# Patient Record
Sex: MALE | Race: WHITE | NOT HISPANIC OR LATINO
[De-identification: names, ages, dates, MRNs, and addresses within clinical notes are randomized per-mention and may not be internally consistent; named-entity substitution may affect disease eponyms.]

---

## 2017-03-27 DIAGNOSIS — Z00.00 ENCOUNTER FOR GENERAL ADULT MEDICAL EXAMINATION W/OUT ABNORMAL FINDINGS: ICD-10-CM

## 2017-04-12 ENCOUNTER — RESULT CHARGE (OUTPATIENT)
Age: 57
End: 2017-04-12

## 2017-04-12 ENCOUNTER — MED ADMIN CHARGE (OUTPATIENT)
Age: 57
End: 2017-04-12

## 2017-04-12 ENCOUNTER — APPOINTMENT (OUTPATIENT)
Dept: INTERNAL MEDICINE | Facility: CLINIC | Age: 57
End: 2017-04-12

## 2017-04-12 VITALS
SYSTOLIC BLOOD PRESSURE: 132 MMHG | DIASTOLIC BLOOD PRESSURE: 80 MMHG | TEMPERATURE: 98.4 F | HEART RATE: 82 BPM | BODY MASS INDEX: 42.66 KG/M2 | WEIGHT: 315 LBS | OXYGEN SATURATION: 95 % | HEIGHT: 72 IN | RESPIRATION RATE: 20 BRPM

## 2017-04-12 DIAGNOSIS — T59.94XA: ICD-10-CM

## 2017-04-12 DIAGNOSIS — Z92.89 PERSONAL HISTORY OF OTHER MEDICAL TREATMENT: ICD-10-CM

## 2017-04-12 DIAGNOSIS — N52.9 MALE ERECTILE DYSFUNCTION, UNSPECIFIED: ICD-10-CM

## 2017-04-12 DIAGNOSIS — R31.29 OTHER MICROSCOPIC HEMATURIA: ICD-10-CM

## 2017-04-12 DIAGNOSIS — F32.9 MAJOR DEPRESSIVE DISORDER, SINGLE EPISODE, UNSPECIFIED: ICD-10-CM

## 2017-04-12 DIAGNOSIS — S89.91XA UNSPECIFIED INJURY OF RIGHT LOWER LEG, INITIAL ENCOUNTER: ICD-10-CM

## 2017-04-12 DIAGNOSIS — Z84.89 FAMILY HISTORY OF OTHER SPECIFIED CONDITIONS: ICD-10-CM

## 2017-04-12 DIAGNOSIS — S42.409A UNSPECIFIED FRACTURE OF LOWER END OF UNSPECIFIED HUMERUS, INITIAL ENCOUNTER FOR CLOSED FRACTURE: ICD-10-CM

## 2017-04-12 DIAGNOSIS — S32.2XXA FRACTURE OF COCCYX, INITIAL ENCOUNTER FOR CLOSED FRACTURE: ICD-10-CM

## 2017-04-12 DIAGNOSIS — J86.9 PYOTHORAX W/OUT FISTULA: ICD-10-CM

## 2017-04-12 DIAGNOSIS — Z86.39 PERSONAL HISTORY OF OTHER ENDOCRINE, NUTRITIONAL AND METABOLIC DISEASE: ICD-10-CM

## 2017-04-12 DIAGNOSIS — Z63.4 DISAPPEARANCE AND DEATH OF FAMILY MEMBER: ICD-10-CM

## 2017-04-12 DIAGNOSIS — Z87.01 PERSONAL HISTORY OF PNEUMONIA (RECURRENT): ICD-10-CM

## 2017-04-12 LAB — GLUCOSE BLDC GLUCOMTR-MCNC: 193

## 2017-04-12 RX ORDER — MULTIVITAMIN
TABLET ORAL
Refills: 0 | Status: ACTIVE | COMMUNITY

## 2017-04-12 RX ORDER — FELODIPINE 10 MG
10 TABLET, EXTENDED RELEASE 24 HR ORAL
Refills: 0 | Status: COMPLETED | COMMUNITY
End: 2017-04-12

## 2017-04-12 RX ORDER — DICLOFENAC SODIUM 10 MG/G
1 GEL TOPICAL
Refills: 0 | Status: COMPLETED | COMMUNITY
End: 2017-04-12

## 2017-04-12 RX ORDER — TRIAMCINOLONE ACETONIDE 40 MG/ML
40 INJECTION, SUSPENSION INTRA-ARTICULAR; INTRAMUSCULAR
Qty: 1 | Refills: 0 | Status: COMPLETED | OUTPATIENT
Start: 2017-04-12

## 2017-04-12 RX ORDER — BUDESONIDE 0.5 MG/2ML
0.5 SUSPENSION RESPIRATORY (INHALATION)
Refills: 0 | Status: COMPLETED | COMMUNITY
End: 2017-04-12

## 2017-04-12 RX ORDER — UBIDECARENONE/VIT E ACET 100MG-5
50 MCG CAPSULE ORAL
Refills: 0 | Status: ACTIVE | COMMUNITY

## 2017-04-12 RX ORDER — CANAGLIFLOZIN 100 MG/1
100 TABLET, FILM COATED ORAL
Refills: 0 | Status: COMPLETED | COMMUNITY
End: 2017-04-12

## 2017-04-12 RX ORDER — OLMESARTAN MEDOXOMIL-HYDROCHLOROTHIAZIDE 12.5; 4 MG/1; MG/1
40-12.5 TABLET, FILM COATED ORAL
Refills: 0 | Status: COMPLETED | COMMUNITY
End: 2017-04-12

## 2017-04-12 RX ORDER — TADALAFIL 5 MG/1
5 TABLET, FILM COATED ORAL
Refills: 0 | Status: COMPLETED | COMMUNITY
End: 2017-04-12

## 2017-04-12 RX ORDER — HYDROCHLOROTHIAZIDE 12.5 MG/1
12.5 CAPSULE ORAL
Refills: 0 | Status: COMPLETED | COMMUNITY
End: 2017-04-12

## 2017-04-12 RX ADMIN — TRIAMCINOLONE ACETONIDE 1.5 MG/ML: 40 INJECTION, SUSPENSION INTRA-ARTICULAR; INTRAMUSCULAR at 00:00

## 2017-04-12 SDOH — SOCIAL STABILITY - SOCIAL INSECURITY: DISSAPEARANCE AND DEATH OF FAMILY MEMBER: Z63.4

## 2017-05-11 ENCOUNTER — RX RENEWAL (OUTPATIENT)
Age: 57
End: 2017-05-11

## 2017-06-12 ENCOUNTER — MEDICATION RENEWAL (OUTPATIENT)
Age: 57
End: 2017-06-12

## 2017-07-11 ENCOUNTER — MEDICATION RENEWAL (OUTPATIENT)
Age: 57
End: 2017-07-11

## 2017-07-26 ENCOUNTER — APPOINTMENT (OUTPATIENT)
Dept: INTERNAL MEDICINE | Facility: CLINIC | Age: 57
End: 2017-07-26

## 2017-07-26 VITALS
OXYGEN SATURATION: 96 % | RESPIRATION RATE: 20 BRPM | BODY MASS INDEX: 42.66 KG/M2 | SYSTOLIC BLOOD PRESSURE: 140 MMHG | HEART RATE: 98 BPM | DIASTOLIC BLOOD PRESSURE: 80 MMHG | TEMPERATURE: 97.8 F | WEIGHT: 315 LBS | HEIGHT: 72 IN

## 2017-07-26 RX ORDER — ENALAPRIL MALEATE 10 MG/1
10 TABLET ORAL
Refills: 0 | Status: COMPLETED | COMMUNITY
End: 2017-07-26

## 2017-08-11 ENCOUNTER — MEDICATION RENEWAL (OUTPATIENT)
Age: 57
End: 2017-08-11

## 2017-08-18 ENCOUNTER — APPOINTMENT (OUTPATIENT)
Dept: INTERNAL MEDICINE | Facility: CLINIC | Age: 57
End: 2017-08-18

## 2017-08-30 LAB — GLUCOSE BLDC GLUCOMTR-MCNC: 152

## 2017-09-05 ENCOUNTER — RX RENEWAL (OUTPATIENT)
Age: 57
End: 2017-09-05

## 2017-09-13 ENCOUNTER — MEDICATION RENEWAL (OUTPATIENT)
Age: 57
End: 2017-09-13

## 2017-10-09 ENCOUNTER — MEDICATION RENEWAL (OUTPATIENT)
Age: 57
End: 2017-10-09

## 2017-10-13 ENCOUNTER — RX RENEWAL (OUTPATIENT)
Age: 57
End: 2017-10-13

## 2017-11-08 PROBLEM — F41.9 ANXIETY: Status: ACTIVE | Noted: 2017-04-12

## 2017-11-08 RX ORDER — ASPIRIN 81 MG
81 TABLET, DELAYED RELEASE (ENTERIC COATED) ORAL
Refills: 0 | Status: COMPLETED | COMMUNITY
End: 2017-11-08

## 2017-11-09 ENCOUNTER — APPOINTMENT (OUTPATIENT)
Dept: INTERNAL MEDICINE | Facility: CLINIC | Age: 57
End: 2017-11-09
Payer: OTHER MISCELLANEOUS

## 2017-11-09 VITALS
HEART RATE: 77 BPM | HEIGHT: 72 IN | SYSTOLIC BLOOD PRESSURE: 126 MMHG | BODY MASS INDEX: 42.66 KG/M2 | TEMPERATURE: 97.2 F | RESPIRATION RATE: 16 BRPM | DIASTOLIC BLOOD PRESSURE: 80 MMHG | OXYGEN SATURATION: 98 % | WEIGHT: 315 LBS

## 2017-11-09 DIAGNOSIS — F41.9 ANXIETY DISORDER, UNSPECIFIED: ICD-10-CM

## 2017-11-09 PROCEDURE — 94060 EVALUATION OF WHEEZING: CPT

## 2017-11-09 PROCEDURE — 99214 OFFICE O/P EST MOD 30 MIN: CPT | Mod: 25

## 2017-11-14 ENCOUNTER — RX RENEWAL (OUTPATIENT)
Age: 57
End: 2017-11-14

## 2017-12-13 ENCOUNTER — RX RENEWAL (OUTPATIENT)
Age: 57
End: 2017-12-13

## 2018-01-12 ENCOUNTER — MEDICATION RENEWAL (OUTPATIENT)
Age: 58
End: 2018-01-12

## 2018-01-23 ENCOUNTER — RESULT CHARGE (OUTPATIENT)
Age: 58
End: 2018-01-23

## 2018-01-23 ENCOUNTER — APPOINTMENT (OUTPATIENT)
Dept: INTERNAL MEDICINE | Facility: CLINIC | Age: 58
End: 2018-01-23
Payer: MEDICARE

## 2018-01-23 VITALS
OXYGEN SATURATION: 96 % | HEART RATE: 81 BPM | BODY MASS INDEX: 42.66 KG/M2 | TEMPERATURE: 98.3 F | SYSTOLIC BLOOD PRESSURE: 132 MMHG | RESPIRATION RATE: 16 BRPM | DIASTOLIC BLOOD PRESSURE: 82 MMHG | HEIGHT: 72 IN | WEIGHT: 315 LBS

## 2018-01-23 DIAGNOSIS — K63.5 POLYP OF COLON: ICD-10-CM

## 2018-01-23 LAB — GLUCOSE BLDC GLUCOMTR-MCNC: 130

## 2018-01-23 PROCEDURE — 82962 GLUCOSE BLOOD TEST: CPT

## 2018-01-23 PROCEDURE — 99214 OFFICE O/P EST MOD 30 MIN: CPT | Mod: 25

## 2018-01-23 PROCEDURE — G0008: CPT

## 2018-01-23 PROCEDURE — 94060 EVALUATION OF WHEEZING: CPT

## 2018-01-23 PROCEDURE — 90686 IIV4 VACC NO PRSV 0.5 ML IM: CPT | Mod: GA

## 2018-01-23 RX ORDER — SILDENAFIL 100 MG/1
100 TABLET, FILM COATED ORAL
Qty: 30 | Refills: 3 | Status: ACTIVE | COMMUNITY
Start: 1900-01-01 | End: 1900-01-01

## 2018-01-29 ENCOUNTER — RX RENEWAL (OUTPATIENT)
Age: 58
End: 2018-01-29

## 2018-02-14 ENCOUNTER — MEDICATION RENEWAL (OUTPATIENT)
Age: 58
End: 2018-02-14

## 2018-02-21 LAB
CHOLEST SERPL-MCNC: 194
CHOLEST/HDLC SERPL: 2.7
GLUCOSE SERPL-MCNC: 167
HBA1C MFR BLD HPLC: 7.2
HDLC SERPL-MCNC: 73
LDLC SERPL CALC-MCNC: 107
PSA SERPL-MCNC: 6.3

## 2018-03-14 ENCOUNTER — RX RENEWAL (OUTPATIENT)
Age: 58
End: 2018-03-14

## 2018-03-20 ENCOUNTER — RX RENEWAL (OUTPATIENT)
Age: 58
End: 2018-03-20

## 2018-04-16 ENCOUNTER — RX RENEWAL (OUTPATIENT)
Age: 58
End: 2018-04-16

## 2018-04-30 ENCOUNTER — RX RENEWAL (OUTPATIENT)
Age: 58
End: 2018-04-30

## 2018-05-09 ENCOUNTER — APPOINTMENT (OUTPATIENT)
Dept: INTERNAL MEDICINE | Facility: CLINIC | Age: 58
End: 2018-05-09
Payer: MEDICARE

## 2018-05-09 ENCOUNTER — MED ADMIN CHARGE (OUTPATIENT)
Age: 58
End: 2018-05-09

## 2018-05-09 ENCOUNTER — NON-APPOINTMENT (OUTPATIENT)
Age: 58
End: 2018-05-09

## 2018-05-09 VITALS
TEMPERATURE: 98 F | SYSTOLIC BLOOD PRESSURE: 130 MMHG | HEIGHT: 72 IN | BODY MASS INDEX: 42.66 KG/M2 | DIASTOLIC BLOOD PRESSURE: 80 MMHG | HEART RATE: 80 BPM | OXYGEN SATURATION: 96 % | RESPIRATION RATE: 16 BRPM | WEIGHT: 315 LBS

## 2018-05-09 DIAGNOSIS — Z57.9 SEVERE PERSISTENT ASTHMA WITH (ACUTE) EXACERBATION: ICD-10-CM

## 2018-05-09 DIAGNOSIS — J45.51 SEVERE PERSISTENT ASTHMA WITH (ACUTE) EXACERBATION: ICD-10-CM

## 2018-05-09 DIAGNOSIS — R97.20 ELEVATED PROSTATE, SPECIFIC ANTIGEN [PSA]: ICD-10-CM

## 2018-05-09 PROCEDURE — 99214 OFFICE O/P EST MOD 30 MIN: CPT | Mod: 25

## 2018-05-09 PROCEDURE — 96372 THER/PROPH/DIAG INJ SC/IM: CPT | Mod: 59

## 2018-05-09 PROCEDURE — 94060 EVALUATION OF WHEEZING: CPT

## 2018-05-09 RX ORDER — TRIAMCINOLONE ACETONIDE 40 MG/ML
40 SUSPENSION INTRA-ARTERIAL; INTRAMUSCULAR
Qty: 1 | Refills: 0 | Status: COMPLETED | OUTPATIENT
Start: 2018-05-09

## 2018-05-09 RX ORDER — CEFUROXIME AXETIL 500 MG/1
500 TABLET ORAL
Qty: 20 | Refills: 0 | Status: COMPLETED | COMMUNITY
Start: 2018-01-23 | End: 2018-05-09

## 2018-05-09 RX ORDER — PREDNISONE 20 MG/1
20 TABLET ORAL TWICE DAILY
Qty: 12 | Refills: 0 | Status: COMPLETED | COMMUNITY
Start: 2018-01-23 | End: 2018-05-09

## 2018-05-09 RX ADMIN — TRIAMCINOLONE ACETONIDE 1.5 MG/ML: 40 INJECTION, SUSPENSION INTRA-ARTICULAR; INTRAMUSCULAR at 00:00

## 2018-05-09 SDOH — HEALTH STABILITY - PHYSICAL HEALTH: OCCUPATIONAL EXPOSURE TO UNSPECIFIED RISK FACTOR: Z57.9

## 2018-05-16 ENCOUNTER — RX RENEWAL (OUTPATIENT)
Age: 58
End: 2018-05-16

## 2018-06-18 ENCOUNTER — MEDICATION RENEWAL (OUTPATIENT)
Age: 58
End: 2018-06-18

## 2018-06-18 RX ORDER — METFORMIN HYDROCHLORIDE 1000 MG/1
1000 TABLET, COATED ORAL TWICE DAILY
Qty: 180 | Refills: 1 | Status: ACTIVE | COMMUNITY
Start: 1900-01-01 | End: 1900-01-01

## 2018-07-02 ENCOUNTER — RX RENEWAL (OUTPATIENT)
Age: 58
End: 2018-07-02

## 2018-07-02 RX ORDER — LEVOFLOXACIN 500 MG/1
500 TABLET, FILM COATED ORAL DAILY
Qty: 10 | Refills: 0 | Status: COMPLETED | COMMUNITY
Start: 2018-05-09 | End: 2018-07-02

## 2018-07-02 RX ORDER — PREDNISONE 20 MG/1
20 TABLET ORAL DAILY
Qty: 18 | Refills: 1 | Status: COMPLETED | COMMUNITY
Start: 2018-05-09 | End: 2018-07-02

## 2018-07-03 ENCOUNTER — RX RENEWAL (OUTPATIENT)
Age: 58
End: 2018-07-03

## 2018-07-10 ENCOUNTER — RX RENEWAL (OUTPATIENT)
Age: 58
End: 2018-07-10

## 2018-07-17 ENCOUNTER — RX RENEWAL (OUTPATIENT)
Age: 58
End: 2018-07-17

## 2018-07-23 ENCOUNTER — MEDICATION RENEWAL (OUTPATIENT)
Age: 58
End: 2018-07-23

## 2018-08-20 ENCOUNTER — MEDICATION RENEWAL (OUTPATIENT)
Age: 58
End: 2018-08-20

## 2018-08-22 ENCOUNTER — MEDICATION RENEWAL (OUTPATIENT)
Age: 58
End: 2018-08-22

## 2018-09-17 ENCOUNTER — RESULT CHARGE (OUTPATIENT)
Age: 58
End: 2018-09-17

## 2018-09-18 ENCOUNTER — MED ADMIN CHARGE (OUTPATIENT)
Age: 58
End: 2018-09-18

## 2018-09-18 ENCOUNTER — NON-APPOINTMENT (OUTPATIENT)
Age: 58
End: 2018-09-18

## 2018-09-18 ENCOUNTER — APPOINTMENT (OUTPATIENT)
Dept: INTERNAL MEDICINE | Facility: CLINIC | Age: 58
End: 2018-09-18
Payer: MEDICARE

## 2018-09-18 VITALS
OXYGEN SATURATION: 96 % | SYSTOLIC BLOOD PRESSURE: 148 MMHG | DIASTOLIC BLOOD PRESSURE: 80 MMHG | HEART RATE: 74 BPM | RESPIRATION RATE: 20 BRPM | HEIGHT: 72 IN | WEIGHT: 315 LBS | BODY MASS INDEX: 42.66 KG/M2 | TEMPERATURE: 97.9 F

## 2018-09-18 DIAGNOSIS — M54.5 LOW BACK PAIN: ICD-10-CM

## 2018-09-18 DIAGNOSIS — J45.909 UNSPECIFIED ASTHMA, UNCOMPLICATED: ICD-10-CM

## 2018-09-18 DIAGNOSIS — M25.50 PAIN IN UNSPECIFIED JOINT: ICD-10-CM

## 2018-09-18 DIAGNOSIS — C61 MALIGNANT NEOPLASM OF PROSTATE: ICD-10-CM

## 2018-09-18 DIAGNOSIS — Z23 ENCOUNTER FOR IMMUNIZATION: ICD-10-CM

## 2018-09-18 DIAGNOSIS — G89.29 LOW BACK PAIN: ICD-10-CM

## 2018-09-18 DIAGNOSIS — N40.1 BENIGN PROSTATIC HYPERPLASIA WITH LOWER URINARY TRACT SYMPMS: ICD-10-CM

## 2018-09-18 DIAGNOSIS — N13.8 BENIGN PROSTATIC HYPERPLASIA WITH LOWER URINARY TRACT SYMPMS: ICD-10-CM

## 2018-09-18 LAB
GLUCOSE BLDC GLUCOMTR-MCNC: 216
HBA1C MFR BLD HPLC: 8.5

## 2018-09-18 PROCEDURE — 94060 EVALUATION OF WHEEZING: CPT

## 2018-09-18 PROCEDURE — 90686 IIV4 VACC NO PRSV 0.5 ML IM: CPT

## 2018-09-18 PROCEDURE — 82962 GLUCOSE BLOOD TEST: CPT

## 2018-09-18 PROCEDURE — 93000 ELECTROCARDIOGRAM COMPLETE: CPT

## 2018-09-18 PROCEDURE — 99214 OFFICE O/P EST MOD 30 MIN: CPT | Mod: 25

## 2018-09-18 PROCEDURE — G0008: CPT

## 2018-09-18 RX ORDER — GLIMEPIRIDE 2 MG/1
2 TABLET ORAL
Qty: 90 | Refills: 1 | Status: ACTIVE | COMMUNITY
Start: 2017-09-05 | End: 1900-01-01

## 2018-09-18 RX ORDER — OXYCODONE AND ACETAMINOPHEN 10; 325 MG/1; MG/1
10-325 TABLET ORAL
Qty: 1 | Refills: 0 | Status: COMPLETED | COMMUNITY
Start: 2018-04-16

## 2018-09-18 RX ORDER — CIPROFLOXACIN HYDROCHLORIDE 500 MG/1
500 TABLET, FILM COATED ORAL
Qty: 2 | Refills: 0 | Status: COMPLETED | COMMUNITY
Start: 2018-04-16

## 2018-09-18 RX ORDER — DIAZEPAM 10 MG/1
10 TABLET ORAL
Qty: 1 | Refills: 0 | Status: COMPLETED | COMMUNITY
Start: 2018-04-16

## 2018-09-18 RX ORDER — ANHYDROUS CITRIC ACID, SODIUM BICARBONATE, AND ASPIRIN 1000; 1985; 500 MG/1; MG/1; MG/1
325 GRANULE, EFFERVESCENT ORAL
Qty: 100 | Refills: 0 | Status: ACTIVE | COMMUNITY

## 2018-09-18 NOTE — PLAN
[FreeTextEntry1] : Strict low fat/chol/Na ADA 1500 moe diet.\par Maintain proper hydration.\par He refuses ER now.\par Referred to cardiology now, Dr Cruz. Office called and appoint scheduled.\par He will call EMS for recurrent anginal sxs and understands the importance.\par ASA 325mg EC QD starting today.\par Continue current medications.\par He must lose weight and is aware of the importance.\par Endocrine consult ASAP in [place].\par Urology and Oncology visit as soon as he returns to [place].\par Opthal and dental visits to continue yearly.\par He will call with any decomp.\par F/U 4 mos or sooner PRn.\par

## 2018-09-18 NOTE — COUNSELING
[Weight management counseling provided] : Weight management [Healthy eating counseling provided] : healthy eating [Activity counseling provided] : activity [Low Fat Diet] : Low fat diet [Low Salt Diet] : Low salt diet [Good understanding] : Patient has a good understanding of lifestyle changes and the steps needed to achieve self management goals [de-identified] : N

## 2018-09-18 NOTE — REVIEW OF SYSTEMS
[Fever] : no fever [Chills] : no chills [Feeling Poorly] : not feeling poorly [Feeling Tired] : feeling tired [Recent Weight Gain (___ Lbs)] : no recent weight gain [Recent Weight Loss (___ Lbs)] : no recent weight loss [Eyesight Problems] : no eyesight problems [Discharge From Eyes] : no purulent discharge from the eyes [Nosebleeds] : no nosebleeds [Nasal Discharge] : no nasal discharge [Sore Throat] : no sore throat [Hoarseness] : no hoarseness [Chest Pain] : no chest pain [Palpitations] : no palpitations [Leg Claudication] : no intermittent leg claudication [Lower Ext Edema] : lower extremity edema [Cough] : cough [Orthopnea] : orthopnea [Wheezing] : wheezing [SOB on Exertion] : shortness of breath during exertion [PND] : no PND [Abdominal Pain] : no abdominal pain [Constipation] : no constipation [Heartburn] : no heartburn [Melena] : no melena [Dysuria] : no dysuria [Incontinence] : no incontinence [Hesitancy] : no urinary hesitancy [Nocturia] : nocturia [Arthralgias] : arthralgias [Joint Swelling] : no joint swelling [Joint Stiffness] : joint stiffness [Sleep Disturbances] : no sleep disturbances [Anxiety] : anxiety [Depression] : no depression [Muscle Weakness] : no muscle weakness [Easy Bleeding] : no tendency for easy bleeding [Easy Bruising] : no tendency for easy bruising [Swollen Glands] : no swollen glands [Negative] : Heme/Lymph

## 2018-09-18 NOTE — PHYSICAL EXAM
[General Appearance - Alert] : alert [General Appearance - In No Acute Distress] : in no acute distress [General Appearance - Well Developed] : well developed [Sclera] : the sclera and conjunctiva were normal [PERRL With Normal Accommodation] : pupils were equal in size, round, and reactive to light [Strabismus] : no strabismus was seen [Outer Ear] : the ears and nose were normal in appearance [Hearing Threshold Finger Rub Not Snyder] : hearing was normal [Examination Of The Oral Cavity] : the lips and gums were normal [Neck Appearance] : the appearance of the neck was normal [Neck Cervical Mass (___cm)] : no neck mass was observed [Jugular Venous Distention Increased] : there was no jugular-venous distention [Thyroid Diffuse Enlargement] : the thyroid was not enlarged [Respiration, Rhythm And Depth] : normal respiratory rhythm and effort [Exaggerated Use Of Accessory Muscles For Inspiration] : no accessory muscle use [Heart Rate And Rhythm] : heart rate was normal and rhythm regular [Heart Sounds] : normal S1 and S2 [Heart Sounds Gallop] : no gallops [Systolic grade ___/6] : A grade [unfilled]/6 systolic murmur was heard. [Arterial Pulses Carotid] : carotid pulses were normal with no bruits [Veins - Varicosity Changes] : there were no varicosital changes [Pitting Edema] : pitting edema present [___ +] : bilateral [unfilled]+ pitting edema to the ankles [Abdomen Soft] : soft [Abdomen Tenderness] : non-tender [Cervical Lymph Nodes Enlarged Anterior Bilaterally] : anterior cervical [Supraclavicular Lymph Nodes Enlarged Bilaterally] : supraclavicular [No CVA Tenderness] : no ~M costovertebral angle tenderness [FreeTextEntry1] : tenderness with palp of the L/S paraspinal muscultature bilat [Nail Clubbing] : no clubbing  or cyanosis of the fingernails [Motor Tone] : muscle strength and tone were normal [Limping On The Right] : limping on the right [Skin Color & Pigmentation] : normal skin color and pigmentation [Skin Turgor] : normal skin turgor [] : no rash [No Focal Deficits] : no focal deficits [Oriented To Time, Place, And Person] : oriented to person, place, and time [Impaired Insight] : insight and judgment were intact [Affect] : the affect was normal [Mood] : the mood was normal

## 2018-09-18 NOTE — HISTORY OF PRESENT ILLNESS
[FreeTextEntry1] : Chest pain, prostate cancer, HTN, DM, asthma and obesity. [de-identified] : The patient has been diagnosed with prostate cancer by Urology in [place] but was told he was too high of a surgical risk for prostatectomy. He has been evaluated by urologic oncology and scheduled for "chemo" as well as possible radiation. He states he has been compliant with strict diet but unable to maintain weight loss. Exercise regimen has been started but he has been experiencing chest pain at times.\par \par Chest pain is episodic, "every few weeks," qualified as sharp to dull pressure and lasts less than 30 min. Typically at rest or durnig stress but not with exercise. This is not associated with palpitation, nausea, GERD, diaphoresis or dyspnea. He notes stable HUANG with chronic "wheezy cough." There is no abd pain, melena or BRBPR. Last stress test was in [place] and over 1 year ago.\par \par He states he is compliant with diabetic diet and medications as listed. "I'm doing good with my medicine now." He does not check his BP or BS at home but denies sxs of hypotension, hypoglycemia, polyuria, polydipsia or polyphagia. He has been compliant with CPAP at .

## 2018-09-18 NOTE — HEALTH RISK ASSESSMENT
[No falls in past year] : Patient reported no falls in the past year [0] : 2) Feeling down, depressed, or hopeless: Not at all (0) [EYM1Pygnr] : 0

## 2018-09-24 ENCOUNTER — MEDICATION RENEWAL (OUTPATIENT)
Age: 58
End: 2018-09-24

## 2018-09-28 ENCOUNTER — MEDICATION RENEWAL (OUTPATIENT)
Age: 58
End: 2018-09-28

## 2018-10-23 ENCOUNTER — RX RENEWAL (OUTPATIENT)
Age: 58
End: 2018-10-23

## 2018-10-29 ENCOUNTER — MEDICATION RENEWAL (OUTPATIENT)
Age: 58
End: 2018-10-29

## 2018-11-19 ENCOUNTER — RX RENEWAL (OUTPATIENT)
Age: 58
End: 2018-11-19

## 2018-11-28 ENCOUNTER — MEDICATION RENEWAL (OUTPATIENT)
Age: 58
End: 2018-11-28

## 2018-12-21 ENCOUNTER — MEDICATION RENEWAL (OUTPATIENT)
Age: 58
End: 2018-12-21

## 2019-01-10 ENCOUNTER — APPOINTMENT (OUTPATIENT)
Dept: INTERNAL MEDICINE | Facility: CLINIC | Age: 59
End: 2019-01-10

## 2019-05-06 ENCOUNTER — RX RENEWAL (OUTPATIENT)
Age: 59
End: 2019-05-06

## 2019-11-12 ENCOUNTER — NON-APPOINTMENT (OUTPATIENT)
Age: 59
End: 2019-11-12

## 2019-11-12 ENCOUNTER — APPOINTMENT (OUTPATIENT)
Dept: INTERNAL MEDICINE | Facility: CLINIC | Age: 59
End: 2019-11-12
Payer: OTHER MISCELLANEOUS

## 2019-11-12 VITALS
RESPIRATION RATE: 22 BRPM | SYSTOLIC BLOOD PRESSURE: 144 MMHG | WEIGHT: 315 LBS | HEIGHT: 72 IN | DIASTOLIC BLOOD PRESSURE: 98 MMHG | BODY MASS INDEX: 42.66 KG/M2 | OXYGEN SATURATION: 96 % | HEART RATE: 88 BPM | TEMPERATURE: 97.8 F

## 2019-11-12 DIAGNOSIS — Z87.09 PERSONAL HISTORY OF OTHER DISEASES OF THE RESPIRATORY SYSTEM: ICD-10-CM

## 2019-11-12 DIAGNOSIS — Z91.048 OTHER NONMEDICINAL SUBSTANCE ALLERGY STATUS: ICD-10-CM

## 2019-11-12 DIAGNOSIS — Z87.898 PERSONAL HISTORY OF OTHER SPECIFIED CONDITIONS: ICD-10-CM

## 2019-11-12 DIAGNOSIS — R07.9 CHEST PAIN, UNSPECIFIED: ICD-10-CM

## 2019-11-12 PROCEDURE — 99214 OFFICE O/P EST MOD 30 MIN: CPT | Mod: 25

## 2019-11-12 PROCEDURE — 94060 EVALUATION OF WHEEZING: CPT

## 2019-11-12 NOTE — PROCEDURE
[FreeTextEntry1] : Spirometry pre-and postbronchodilator therapy show significant obstructive lung disease. FEV1 is 1.54 L which is 41% predicted. FEV1 percent is 57%. There is no significant bronchodilator response.

## 2019-11-12 NOTE — HISTORY OF PRESENT ILLNESS
[FreeTextEntry1] : Mr. Jones presents for followup evaluation. He is now here for pulmonary followup for Worker's Compensation. He has a history of severe asthma as a result of exposure to chemical spill. Patient states he has a primary care doctor in [place]. He now lives in [facility]. Patient continues to have shortness of breath with exertion. He states he recently ran out of his inhalers and needs refills. He gets occasional nocturnal symptoms of cough and dyspnea. Patient states that he does have a cough productive of dark phlegm over the past 3-4 days. He denies any fevers or chills.

## 2019-11-12 NOTE — PHYSICAL EXAM
[General Appearance - Well Developed] : well developed [Normal Appearance] : normal appearance [General Appearance - Well Nourished] : well nourished [Well Groomed] : well groomed [No Deformities] : no deformities [General Appearance - In No Acute Distress] : no acute distress [Normal Conjunctiva] : the conjunctiva exhibited no abnormalities [Eyelids - No Xanthelasma] : the eyelids demonstrated no xanthelasmas [Neck Appearance] : the appearance of the neck was normal [Normal Oropharynx] : normal oropharynx [Jugular Venous Distention Increased] : there was no jugular-venous distention [Thyroid Diffuse Enlargement] : the thyroid was not enlarged [Neck Cervical Mass (___cm)] : no neck mass was observed [Thyroid Nodule] : there were no palpable thyroid nodules [Heart Rate And Rhythm] : heart rate and rhythm were normal [Heart Sounds] : normal S1 and S2 [Murmurs] : no murmurs present [Respiration, Rhythm And Depth] : normal respiratory rhythm and effort [Exaggerated Use Of Accessory Muscles For Inspiration] : no accessory muscle use [Abdomen Soft] : soft [Abdomen Tenderness] : non-tender [Abnormal Walk] : normal gait [Gait - Sufficient For Exercise Testing] : the gait was sufficient for exercise testing [Abdomen Mass (___ Cm)] : no abdominal mass palpated [Nail Clubbing] : no clubbing of the fingernails [Petechial Hemorrhages (___cm)] : no petechial hemorrhages [Cyanosis, Localized] : no localized cyanosis [Skin Color & Pigmentation] : normal skin color and pigmentation [] : no rash [Skin Lesions] : no skin lesions [No Venous Stasis] : no venous stasis [No Xanthoma] : no  xanthoma was observed [No Skin Ulcers] : no skin ulcer [Sensation] : the sensory exam was normal to light touch and pinprick [Deep Tendon Reflexes (DTR)] : deep tendon reflexes were 2+ and symmetric [No Focal Deficits] : no focal deficits [Oriented To Time, Place, And Person] : oriented to person, place, and time [Impaired Insight] : insight and judgment were intact [Affect] : the affect was normal [FreeTextEntry1] : bilateral expiratory rhonchi

## 2019-11-12 NOTE — ASSESSMENT
[FreeTextEntry1] : Mr. Jones presents reporting followup for Worker's Compensation. I renewed prescriptions for Symbicort, albuterol/ipratropium nebulizer and Ventolin inhaler. Patient will also begin prednisone 20 mg b.i.d. x7 days and cefuroxime 500 mg p.o. b.i.d. x7 days for his upper respiratory infection. He will continue to followup with his primary care doctor in [place]. Followup in this office in 6 months.

## 2020-05-15 ENCOUNTER — APPOINTMENT (OUTPATIENT)
Dept: INTERNAL MEDICINE | Facility: CLINIC | Age: 60
End: 2020-05-15

## 2020-08-24 ENCOUNTER — RX RENEWAL (OUTPATIENT)
Age: 60
End: 2020-08-24

## 2020-09-14 ENCOUNTER — APPOINTMENT (OUTPATIENT)
Dept: INTERNAL MEDICINE | Facility: HOSPITAL | Age: 60
End: 2020-09-14

## 2020-09-14 ENCOUNTER — APPOINTMENT (OUTPATIENT)
Dept: INTERNAL MEDICINE | Facility: CLINIC | Age: 60
End: 2020-09-14

## 2020-09-27 ENCOUNTER — RX RENEWAL (OUTPATIENT)
Age: 60
End: 2020-09-27

## 2020-12-21 PROBLEM — Z87.09 HISTORY OF ACUTE BRONCHITIS: Status: RESOLVED | Noted: 2019-11-12 | Resolved: 2020-12-21

## 2021-05-04 DIAGNOSIS — Z01.812 ENCOUNTER FOR PREPROCEDURAL LABORATORY EXAMINATION: ICD-10-CM

## 2021-05-04 DIAGNOSIS — Z20.822 ENCOUNTER FOR PREPROCEDURAL LABORATORY EXAMINATION: ICD-10-CM

## 2021-05-07 LAB — SARS-COV-2 N GENE NPH QL NAA+PROBE: NOT DETECTED

## 2021-05-10 ENCOUNTER — APPOINTMENT (OUTPATIENT)
Dept: INTERNAL MEDICINE | Facility: CLINIC | Age: 61
End: 2021-05-10
Payer: MEDICARE

## 2021-05-10 VITALS
WEIGHT: 315 LBS | DIASTOLIC BLOOD PRESSURE: 86 MMHG | HEIGHT: 70 IN | TEMPERATURE: 98.3 F | SYSTOLIC BLOOD PRESSURE: 148 MMHG | BODY MASS INDEX: 45.1 KG/M2 | OXYGEN SATURATION: 96 % | HEART RATE: 99 BPM | RESPIRATION RATE: 18 BRPM

## 2021-05-10 DIAGNOSIS — Z23 ENCOUNTER FOR IMMUNIZATION: ICD-10-CM

## 2021-05-10 DIAGNOSIS — Z87.891 PERSONAL HISTORY OF NICOTINE DEPENDENCE: ICD-10-CM

## 2021-05-10 PROCEDURE — 94060 EVALUATION OF WHEEZING: CPT

## 2021-05-10 PROCEDURE — 94729 DIFFUSING CAPACITY: CPT

## 2021-05-10 PROCEDURE — 99072 ADDL SUPL MATRL&STAF TM PHE: CPT

## 2021-05-10 PROCEDURE — 99215 OFFICE O/P EST HI 40 MIN: CPT | Mod: 25

## 2021-05-10 PROCEDURE — ZZZZZ: CPT

## 2021-05-10 PROCEDURE — G0009: CPT

## 2021-05-10 PROCEDURE — 94727 GAS DIL/WSHOT DETER LNG VOL: CPT

## 2021-05-10 PROCEDURE — 90732 PPSV23 VACC 2 YRS+ SUBQ/IM: CPT

## 2021-05-10 RX ORDER — CEFUROXIME AXETIL 500 MG/1
500 TABLET ORAL
Qty: 14 | Refills: 1 | Status: COMPLETED | COMMUNITY
Start: 2021-05-10 | End: 2021-05-24

## 2021-05-10 RX ORDER — ALPRAZOLAM 0.5 MG/1
0.5 TABLET ORAL 3 TIMES DAILY
Qty: 90 | Refills: 0 | Status: DISCONTINUED | COMMUNITY
Start: 2017-04-12 | End: 2021-05-10

## 2021-05-11 NOTE — ASSESSMENT
[FreeTextEntry1] : Complete pulmonary function tests show significant obstructive lung disease. FEV1 is 1.56 L which is 45% addicted. FVC is 3.19 L with 70% predicted. FEV1/FVC ratio is 49%. Total lung capacity is 5.71 L which is 80% predicted. Diffusing capacity is 83%.

## 2021-05-11 NOTE — PLAN
[FreeTextEntry1] :  presents for a followup evaluation. Medical history was reviewed since his last office visit. He has a productive cough and will be given a trial of cefuroxime 500 mg p.o. b.i.d. He will continue on current medication regimen which has been reviewed and revised. Patient be given a prescription for CBC, CMP with hemoglobin A1c, iron level, lipid profile, TSH level, PSA and urinalysis. Mr. Jones had previously seen Dr. Cruz for cardiology. He will make a followup appointment. Patient will receive the pneumococcal vaccine and has been advised to receive the corona virus vaccine as well. Complete pulmonary function tests show significant obstructive lung disease. Patient will continue on Symbicort 160/4.5 mcg 2 puffs b.i.d. and albuterol p.r.n. for rescue therapy. He will followup as scheduled.

## 2021-05-11 NOTE — HISTORY OF PRESENT ILLNESS
[FreeTextEntry1] : Followup evaluation [de-identified] : Mr. Jones presents for followup evaluation. He is complaining of cough productive of thick phlegm. The cough seems to be worse in the morning. He has no associated fevers or chills. Patient has a history of COPD and is on Symbicort 160/4.5 mcg 2 puffs b.i.d. and has Ventolin meter dose inhaler for rescue therapy. Patient also has a history of significant for type 2 diabetes, hyperlipidemia and hypertension. He has been living in [place]. Mr. Jones was last seen in this office in 2018.

## 2021-05-12 RX ORDER — HYDROCODONE BITARTRATE AND ACETAMINOPHEN 5; 325 MG/1; MG/1
5-325 TABLET ORAL
Qty: 60 | Refills: 0 | Status: DISCONTINUED | COMMUNITY
Start: 2017-04-12 | End: 2021-05-12

## 2021-05-12 RX ORDER — LISINOPRIL 10 MG/1
10 TABLET ORAL
Qty: 90 | Refills: 0 | Status: DISCONTINUED | COMMUNITY
Start: 2018-11-19 | End: 2021-05-12

## 2021-05-13 LAB
ALBUMIN SERPL ELPH-MCNC: 4.4 G/DL
ALP BLD-CCNC: 82 U/L
ALT SERPL-CCNC: 35 U/L
ANION GAP SERPL CALC-SCNC: 14 MMOL/L
APPEARANCE: CLEAR
AST SERPL-CCNC: 22 U/L
BACTERIA: NEGATIVE
BASOPHILS # BLD AUTO: 0.07 K/UL
BASOPHILS NFR BLD AUTO: 1.1 %
BILIRUB SERPL-MCNC: 0.6 MG/DL
BILIRUBIN URINE: NEGATIVE
BLOOD URINE: NEGATIVE
BUN SERPL-MCNC: 18 MG/DL
CALCIUM SERPL-MCNC: 9.8 MG/DL
CHLORIDE SERPL-SCNC: 99 MMOL/L
CHOLEST SERPL-MCNC: 204 MG/DL
CO2 SERPL-SCNC: 26 MMOL/L
COLOR: YELLOW
CREAT SERPL-MCNC: 0.94 MG/DL
EOSINOPHIL # BLD AUTO: 0.24 K/UL
EOSINOPHIL NFR BLD AUTO: 3.9 %
ESTIMATED AVERAGE GLUCOSE: 194 MG/DL
GLUCOSE QUALITATIVE U: ABNORMAL
GLUCOSE SERPL-MCNC: 230 MG/DL
HBA1C MFR BLD HPLC: 8.4 %
HCT VFR BLD CALC: 46.5 %
HDLC SERPL-MCNC: 58 MG/DL
HGB BLD-MCNC: 15.2 G/DL
HYALINE CASTS: 0 /LPF
IMM GRANULOCYTES NFR BLD AUTO: 0.2 %
IRON SERPL-MCNC: 127 UG/DL
KETONES URINE: NEGATIVE
LDLC SERPL CALC-MCNC: 127 MG/DL
LEUKOCYTE ESTERASE URINE: NEGATIVE
LYMPHOCYTES # BLD AUTO: 1.45 K/UL
LYMPHOCYTES NFR BLD AUTO: 23.7 %
MAN DIFF?: NORMAL
MCHC RBC-ENTMCNC: 30.2 PG
MCHC RBC-ENTMCNC: 32.7 GM/DL
MCV RBC AUTO: 92.3 FL
MICROSCOPIC-UA: NORMAL
MONOCYTES # BLD AUTO: 0.78 K/UL
MONOCYTES NFR BLD AUTO: 12.7 %
NEUTROPHILS # BLD AUTO: 3.57 K/UL
NEUTROPHILS NFR BLD AUTO: 58.4 %
NITRITE URINE: NEGATIVE
NONHDLC SERPL-MCNC: 146 MG/DL
PH URINE: 5.5
PLATELET # BLD AUTO: 305 K/UL
POTASSIUM SERPL-SCNC: 4.6 MMOL/L
PROT SERPL-MCNC: 6.9 G/DL
PROTEIN URINE: NORMAL
PSA SERPL-MCNC: 0.63 NG/ML
RBC # BLD: 5.04 M/UL
RBC # FLD: 12.9 %
RED BLOOD CELLS URINE: 1 /HPF
SODIUM SERPL-SCNC: 138 MMOL/L
SPECIFIC GRAVITY URINE: 1.02
SQUAMOUS EPITHELIAL CELLS: 1 /HPF
TRIGL SERPL-MCNC: 95 MG/DL
TSH SERPL-ACNC: 1.59 UIU/ML
UROBILINOGEN URINE: NORMAL
WBC # FLD AUTO: 6.12 K/UL
WHITE BLOOD CELLS URINE: 4 /HPF

## 2021-05-17 RX ORDER — ATORVASTATIN CALCIUM 40 MG/1
40 TABLET, FILM COATED ORAL
Qty: 90 | Refills: 1 | Status: ACTIVE | COMMUNITY
Start: 1900-01-01 | End: 1900-01-01

## 2022-06-03 RX ORDER — CIPROFLOXACIN LACTATE 400MG/40ML
1 VIAL (ML) INTRAVENOUS
Qty: 0 | Refills: 0 | DISCHARGE
Start: 2022-06-03 | End: 2022-06-10

## 2022-06-19 ENCOUNTER — RESULT CHARGE (OUTPATIENT)
Age: 62
End: 2022-06-19

## 2022-06-20 ENCOUNTER — INPATIENT (INPATIENT)
Facility: HOSPITAL | Age: 62
LOS: 3 days | Discharge: HOME | DRG: 140 | End: 2022-06-24
Attending: STUDENT IN AN ORGANIZED HEALTH CARE EDUCATION/TRAINING PROGRAM | Admitting: STUDENT IN AN ORGANIZED HEALTH CARE EDUCATION/TRAINING PROGRAM
Payer: COMMERCIAL

## 2022-06-20 ENCOUNTER — NON-APPOINTMENT (OUTPATIENT)
Age: 62
End: 2022-06-20

## 2022-06-20 ENCOUNTER — APPOINTMENT (OUTPATIENT)
Dept: INTERNAL MEDICINE | Facility: CLINIC | Age: 62
End: 2022-06-20
Payer: MEDICARE

## 2022-06-20 VITALS
TEMPERATURE: 99 F | RESPIRATION RATE: 20 BRPM | SYSTOLIC BLOOD PRESSURE: 119 MMHG | OXYGEN SATURATION: 96 % | HEART RATE: 110 BPM | WEIGHT: 315 LBS | DIASTOLIC BLOOD PRESSURE: 79 MMHG

## 2022-06-20 VITALS
WEIGHT: 315 LBS | SYSTOLIC BLOOD PRESSURE: 108 MMHG | HEART RATE: 126 BPM | RESPIRATION RATE: 20 BRPM | HEIGHT: 70 IN | TEMPERATURE: 97.6 F | OXYGEN SATURATION: 98 % | DIASTOLIC BLOOD PRESSURE: 64 MMHG | BODY MASS INDEX: 45.1 KG/M2

## 2022-06-20 DIAGNOSIS — I48.92 UNSPECIFIED ATRIAL FLUTTER: ICD-10-CM

## 2022-06-20 DIAGNOSIS — Z90.2 ACQUIRED ABSENCE OF LUNG [PART OF]: Chronic | ICD-10-CM

## 2022-06-20 DIAGNOSIS — R06.02 SHORTNESS OF BREATH: ICD-10-CM

## 2022-06-20 LAB
ALBUMIN SERPL ELPH-MCNC: 3.2 G/DL — LOW (ref 3.3–5)
ALP SERPL-CCNC: 58 U/L — SIGNIFICANT CHANGE UP (ref 40–120)
ALT FLD-CCNC: 35 U/L — SIGNIFICANT CHANGE UP (ref 12–78)
ANION GAP SERPL CALC-SCNC: 9 MMOL/L — SIGNIFICANT CHANGE UP (ref 5–17)
APTT BLD: 31 SEC — SIGNIFICANT CHANGE UP (ref 27.5–35.5)
AST SERPL-CCNC: 15 U/L — SIGNIFICANT CHANGE UP (ref 15–37)
BASE EXCESS BLDV CALC-SCNC: 6.6 MMOL/L — SIGNIFICANT CHANGE UP
BASOPHILS # BLD AUTO: 0.06 K/UL — SIGNIFICANT CHANGE UP (ref 0–0.2)
BASOPHILS NFR BLD AUTO: 0.7 % — SIGNIFICANT CHANGE UP (ref 0–2)
BILIRUB SERPL-MCNC: 0.6 MG/DL — SIGNIFICANT CHANGE UP (ref 0.2–1.2)
BUN SERPL-MCNC: 17 MG/DL — SIGNIFICANT CHANGE UP (ref 7–23)
CALCIUM SERPL-MCNC: 9.3 MG/DL — SIGNIFICANT CHANGE UP (ref 8.5–10.1)
CHLORIDE SERPL-SCNC: 103 MMOL/L — SIGNIFICANT CHANGE UP (ref 96–108)
CO2 BLDV-SCNC: 34 MMOL/L — HIGH (ref 22–26)
CO2 SERPL-SCNC: 28 MMOL/L — SIGNIFICANT CHANGE UP (ref 22–31)
CREAT SERPL-MCNC: 1.12 MG/DL — SIGNIFICANT CHANGE UP (ref 0.5–1.3)
D DIMER BLD IA.RAPID-MCNC: 154 NG/ML DDU — SIGNIFICANT CHANGE UP
EGFR: 74 ML/MIN/1.73M2 — SIGNIFICANT CHANGE UP
EOSINOPHIL # BLD AUTO: 0.1 K/UL — SIGNIFICANT CHANGE UP (ref 0–0.5)
EOSINOPHIL NFR BLD AUTO: 1.2 % — SIGNIFICANT CHANGE UP (ref 0–6)
GAS PNL BLDV: SIGNIFICANT CHANGE UP
GLUCOSE BLDC GLUCOMTR-MCNC: 108 MG/DL — HIGH (ref 70–99)
GLUCOSE BLDC GLUCOMTR-MCNC: 292 MG/DL — HIGH (ref 70–99)
GLUCOSE SERPL-MCNC: 155 MG/DL — HIGH (ref 70–99)
HCO3 BLDV-SCNC: 32 MMOL/L — HIGH (ref 22–29)
HCT VFR BLD CALC: 46.4 % — SIGNIFICANT CHANGE UP (ref 39–50)
HGB BLD-MCNC: 15.8 G/DL — SIGNIFICANT CHANGE UP (ref 13–17)
IMM GRANULOCYTES NFR BLD AUTO: 0.2 % — SIGNIFICANT CHANGE UP (ref 0–1.5)
INR BLD: 1.07 RATIO — SIGNIFICANT CHANGE UP (ref 0.88–1.16)
LYMPHOCYTES # BLD AUTO: 1.02 K/UL — SIGNIFICANT CHANGE UP (ref 1–3.3)
LYMPHOCYTES # BLD AUTO: 12.6 % — LOW (ref 13–44)
MAGNESIUM SERPL-MCNC: 2.3 MG/DL — SIGNIFICANT CHANGE UP (ref 1.6–2.6)
MCHC RBC-ENTMCNC: 29.7 PG — SIGNIFICANT CHANGE UP (ref 27–34)
MCHC RBC-ENTMCNC: 34.1 GM/DL — SIGNIFICANT CHANGE UP (ref 32–36)
MCV RBC AUTO: 87.2 FL — SIGNIFICANT CHANGE UP (ref 80–100)
MONOCYTES # BLD AUTO: 0.82 K/UL — SIGNIFICANT CHANGE UP (ref 0–0.9)
MONOCYTES NFR BLD AUTO: 10.1 % — SIGNIFICANT CHANGE UP (ref 2–14)
NEUTROPHILS # BLD AUTO: 6.07 K/UL — SIGNIFICANT CHANGE UP (ref 1.8–7.4)
NEUTROPHILS NFR BLD AUTO: 75.2 % — SIGNIFICANT CHANGE UP (ref 43–77)
NT-PROBNP SERPL-SCNC: 222 PG/ML — HIGH (ref 0–125)
PCO2 BLDV: 50 MMHG — SIGNIFICANT CHANGE UP (ref 42–55)
PH BLDV: 7.42 — SIGNIFICANT CHANGE UP (ref 7.32–7.43)
PLATELET # BLD AUTO: 307 K/UL — SIGNIFICANT CHANGE UP (ref 150–400)
PO2 BLDV: 80 MMHG — SIGNIFICANT CHANGE UP
POTASSIUM SERPL-MCNC: 3.4 MMOL/L — LOW (ref 3.5–5.3)
POTASSIUM SERPL-SCNC: 3.4 MMOL/L — LOW (ref 3.5–5.3)
PROT SERPL-MCNC: 6.3 GM/DL — SIGNIFICANT CHANGE UP (ref 6–8.3)
PROTHROM AB SERPL-ACNC: 12.4 SEC — SIGNIFICANT CHANGE UP (ref 10.5–13.4)
RAPID RVP RESULT: SIGNIFICANT CHANGE UP
RBC # BLD: 5.32 M/UL — SIGNIFICANT CHANGE UP (ref 4.2–5.8)
RBC # FLD: 13.3 % — SIGNIFICANT CHANGE UP (ref 10.3–14.5)
SAO2 % BLDV: 96.7 % — SIGNIFICANT CHANGE UP
SARS-COV-2 RNA SPEC QL NAA+PROBE: SIGNIFICANT CHANGE UP
SODIUM SERPL-SCNC: 140 MMOL/L — SIGNIFICANT CHANGE UP (ref 135–145)
TROPONIN I, HIGH SENSITIVITY RESULT: 23.52 NG/L — SIGNIFICANT CHANGE UP
TROPONIN I, HIGH SENSITIVITY RESULT: 30.93 NG/L — SIGNIFICANT CHANGE UP
WBC # BLD: 8.09 K/UL — SIGNIFICANT CHANGE UP (ref 3.8–10.5)
WBC # FLD AUTO: 8.09 K/UL — SIGNIFICANT CHANGE UP (ref 3.8–10.5)

## 2022-06-20 PROCEDURE — 97116 GAIT TRAINING THERAPY: CPT | Mod: GP

## 2022-06-20 PROCEDURE — 99215 OFFICE O/P EST HI 40 MIN: CPT | Mod: 25

## 2022-06-20 PROCEDURE — 71045 X-RAY EXAM CHEST 1 VIEW: CPT | Mod: 26

## 2022-06-20 PROCEDURE — 83036 HEMOGLOBIN GLYCOSYLATED A1C: CPT

## 2022-06-20 PROCEDURE — 94060 EVALUATION OF WHEEZING: CPT

## 2022-06-20 PROCEDURE — 93000 ELECTROCARDIOGRAM COMPLETE: CPT

## 2022-06-20 PROCEDURE — 99285 EMERGENCY DEPT VISIT HI MDM: CPT

## 2022-06-20 PROCEDURE — 83735 ASSAY OF MAGNESIUM: CPT

## 2022-06-20 PROCEDURE — 82962 GLUCOSE BLOOD TEST: CPT

## 2022-06-20 PROCEDURE — 80048 BASIC METABOLIC PNL TOTAL CA: CPT

## 2022-06-20 PROCEDURE — 80053 COMPREHEN METABOLIC PANEL: CPT

## 2022-06-20 PROCEDURE — 85027 COMPLETE CBC AUTOMATED: CPT

## 2022-06-20 PROCEDURE — 85610 PROTHROMBIN TIME: CPT

## 2022-06-20 PROCEDURE — 97162 PT EVAL MOD COMPLEX 30 MIN: CPT | Mod: GP

## 2022-06-20 PROCEDURE — 99223 1ST HOSP IP/OBS HIGH 75: CPT

## 2022-06-20 PROCEDURE — 93306 TTE W/DOPPLER COMPLETE: CPT

## 2022-06-20 PROCEDURE — 93010 ELECTROCARDIOGRAM REPORT: CPT

## 2022-06-20 PROCEDURE — 85025 COMPLETE CBC W/AUTO DIFF WBC: CPT

## 2022-06-20 PROCEDURE — 86803 HEPATITIS C AB TEST: CPT

## 2022-06-20 PROCEDURE — 84484 ASSAY OF TROPONIN QUANT: CPT

## 2022-06-20 PROCEDURE — 94618 PULMONARY STRESS TESTING: CPT

## 2022-06-20 PROCEDURE — 93970 EXTREMITY STUDY: CPT

## 2022-06-20 PROCEDURE — 93005 ELECTROCARDIOGRAM TRACING: CPT

## 2022-06-20 PROCEDURE — 81003 URINALYSIS AUTO W/O SCOPE: CPT

## 2022-06-20 PROCEDURE — 36415 COLL VENOUS BLD VENIPUNCTURE: CPT

## 2022-06-20 PROCEDURE — 94640 AIRWAY INHALATION TREATMENT: CPT

## 2022-06-20 PROCEDURE — 97530 THERAPEUTIC ACTIVITIES: CPT | Mod: GP

## 2022-06-20 RX ORDER — POLYETHYLENE GLYCOL 3350 17 G/17G
17 POWDER, FOR SOLUTION ORAL DAILY
Refills: 0 | Status: DISCONTINUED | OUTPATIENT
Start: 2022-06-20 | End: 2022-06-24

## 2022-06-20 RX ORDER — DEXTROSE 50 % IN WATER 50 %
25 SYRINGE (ML) INTRAVENOUS ONCE
Refills: 0 | Status: DISCONTINUED | OUTPATIENT
Start: 2022-06-20 | End: 2022-06-24

## 2022-06-20 RX ORDER — IPRATROPIUM/ALBUTEROL SULFATE 18-103MCG
3 AEROSOL WITH ADAPTER (GRAM) INHALATION ONCE
Refills: 0 | Status: COMPLETED | OUTPATIENT
Start: 2022-06-20 | End: 2022-06-20

## 2022-06-20 RX ORDER — DEXTROSE 50 % IN WATER 50 %
15 SYRINGE (ML) INTRAVENOUS ONCE
Refills: 0 | Status: DISCONTINUED | OUTPATIENT
Start: 2022-06-20 | End: 2022-06-24

## 2022-06-20 RX ORDER — DEXTROSE 50 % IN WATER 50 %
12.5 SYRINGE (ML) INTRAVENOUS ONCE
Refills: 0 | Status: DISCONTINUED | OUTPATIENT
Start: 2022-06-20 | End: 2022-06-24

## 2022-06-20 RX ORDER — GLUCAGON INJECTION, SOLUTION 0.5 MG/.1ML
1 INJECTION, SOLUTION SUBCUTANEOUS ONCE
Refills: 0 | Status: DISCONTINUED | OUTPATIENT
Start: 2022-06-20 | End: 2022-06-24

## 2022-06-20 RX ORDER — POTASSIUM CHLORIDE 20 MEQ
40 PACKET (EA) ORAL ONCE
Refills: 0 | Status: COMPLETED | OUTPATIENT
Start: 2022-06-20 | End: 2022-06-20

## 2022-06-20 RX ORDER — FUROSEMIDE 40 MG
40 TABLET ORAL ONCE
Refills: 0 | Status: COMPLETED | OUTPATIENT
Start: 2022-06-20 | End: 2022-06-20

## 2022-06-20 RX ORDER — ATORVASTATIN CALCIUM 80 MG/1
40 TABLET, FILM COATED ORAL DAILY
Refills: 0 | Status: DISCONTINUED | OUTPATIENT
Start: 2022-06-20 | End: 2022-06-20

## 2022-06-20 RX ORDER — HYDROCHLOROTHIAZIDE 25 MG
25 TABLET ORAL DAILY
Refills: 0 | Status: DISCONTINUED | OUTPATIENT
Start: 2022-06-20 | End: 2022-06-24

## 2022-06-20 RX ORDER — GABAPENTIN 400 MG/1
800 CAPSULE ORAL THREE TIMES A DAY
Refills: 0 | Status: DISCONTINUED | OUTPATIENT
Start: 2022-06-20 | End: 2022-06-24

## 2022-06-20 RX ORDER — SPIRONOLACTONE 25 MG/1
25 TABLET, FILM COATED ORAL DAILY
Refills: 0 | Status: DISCONTINUED | OUTPATIENT
Start: 2022-06-20 | End: 2022-06-24

## 2022-06-20 RX ORDER — ATORVASTATIN CALCIUM 80 MG/1
40 TABLET, FILM COATED ORAL AT BEDTIME
Refills: 0 | Status: DISCONTINUED | OUTPATIENT
Start: 2022-06-20 | End: 2022-06-24

## 2022-06-20 RX ORDER — SODIUM CHLORIDE 9 MG/ML
1000 INJECTION, SOLUTION INTRAVENOUS
Refills: 0 | Status: DISCONTINUED | OUTPATIENT
Start: 2022-06-20 | End: 2022-06-24

## 2022-06-20 RX ORDER — ALBUTEROL 90 UG/1
2 AEROSOL, METERED ORAL EVERY 6 HOURS
Refills: 0 | Status: DISCONTINUED | OUTPATIENT
Start: 2022-06-20 | End: 2022-06-24

## 2022-06-20 RX ORDER — HEPARIN SODIUM 5000 [USP'U]/ML
5000 INJECTION INTRAVENOUS; SUBCUTANEOUS EVERY 12 HOURS
Refills: 0 | Status: DISCONTINUED | OUTPATIENT
Start: 2022-06-20 | End: 2022-06-24

## 2022-06-20 RX ORDER — ACETAMINOPHEN 500 MG
650 TABLET ORAL EVERY 6 HOURS
Refills: 0 | Status: DISCONTINUED | OUTPATIENT
Start: 2022-06-20 | End: 2022-06-24

## 2022-06-20 RX ORDER — BUDESONIDE AND FORMOTEROL FUMARATE DIHYDRATE 160; 4.5 UG/1; UG/1
2 AEROSOL RESPIRATORY (INHALATION)
Refills: 0 | Status: DISCONTINUED | OUTPATIENT
Start: 2022-06-20 | End: 2022-06-24

## 2022-06-20 RX ORDER — INSULIN LISPRO 100/ML
VIAL (ML) SUBCUTANEOUS
Refills: 0 | Status: DISCONTINUED | OUTPATIENT
Start: 2022-06-20 | End: 2022-06-21

## 2022-06-20 RX ORDER — LANOLIN ALCOHOL/MO/W.PET/CERES
3 CREAM (GRAM) TOPICAL AT BEDTIME
Refills: 0 | Status: DISCONTINUED | OUTPATIENT
Start: 2022-06-20 | End: 2022-06-24

## 2022-06-20 RX ORDER — ONDANSETRON 8 MG/1
4 TABLET, FILM COATED ORAL EVERY 8 HOURS
Refills: 0 | Status: DISCONTINUED | OUTPATIENT
Start: 2022-06-20 | End: 2022-06-24

## 2022-06-20 RX ORDER — INSULIN GLARGINE 100 [IU]/ML
50 INJECTION, SOLUTION SUBCUTANEOUS AT BEDTIME
Refills: 0 | Status: DISCONTINUED | OUTPATIENT
Start: 2022-06-20 | End: 2022-06-24

## 2022-06-20 RX ORDER — LOSARTAN POTASSIUM 100 MG/1
100 TABLET, FILM COATED ORAL DAILY
Refills: 0 | Status: DISCONTINUED | OUTPATIENT
Start: 2022-06-20 | End: 2022-06-24

## 2022-06-20 RX ADMIN — ATORVASTATIN CALCIUM 40 MILLIGRAM(S): 80 TABLET, FILM COATED ORAL at 21:52

## 2022-06-20 RX ADMIN — Medication 650 MILLIGRAM(S): at 21:55

## 2022-06-20 RX ADMIN — Medication 3 MILLILITER(S): at 14:18

## 2022-06-20 RX ADMIN — Medication 3 MILLILITER(S): at 14:19

## 2022-06-20 RX ADMIN — GABAPENTIN 800 MILLIGRAM(S): 400 CAPSULE ORAL at 21:52

## 2022-06-20 RX ADMIN — HEPARIN SODIUM 5000 UNIT(S): 5000 INJECTION INTRAVENOUS; SUBCUTANEOUS at 21:53

## 2022-06-20 RX ADMIN — Medication 125 MILLIGRAM(S): at 14:19

## 2022-06-20 RX ADMIN — Medication 40 MILLIGRAM(S): at 18:33

## 2022-06-20 RX ADMIN — Medication 40 MILLIEQUIVALENT(S): at 18:32

## 2022-06-20 RX ADMIN — POLYETHYLENE GLYCOL 3350 17 GRAM(S): 17 POWDER, FOR SOLUTION ORAL at 18:33

## 2022-06-20 RX ADMIN — INSULIN GLARGINE 50 UNIT(S): 100 INJECTION, SOLUTION SUBCUTANEOUS at 22:37

## 2022-06-20 NOTE — ED PROVIDER NOTE - OBJECTIVE STATEMENT
62 y/o M w/ PMHx of COPD, DM, asthma presents to ED c/o worsening SOB, weakness x few weeks. Pt reports he went to Dr. Delgado office who advised him to come to ED for further evaluation. Pt endorses increased lower extremity swelling. Denies fever, chills. cough, n/v. Pt recently drove up from [place] on 6/17. 62 y/o M w/ PMHx of COPD, DM, asthma presents to ED c/o worsening SOB, weakness x few weeks. Pt reports he went to Dr. Delgado office who advised him to come to ED for further evaluation. Pt endorses increased lower extremity swelling. Denies fever, chills. + cough, no n/v. Pt recently drove up from [place] on 6/17. Dr. Pisano called in ad stated that pt was diaphoretic in office with ekg showing aflutter with block and was wheezing. he called EMS when ems got there they felt he was in svt gave 6mg then 12mg of adenosine. here pt still sob and wheezing hr 110s sinus.

## 2022-06-20 NOTE — H&P ADULT - HISTORY OF PRESENT ILLNESS
61 year old male patient with pertinent past medial history noted for  COPD and DM presented to the ED complaining of a one week history of progressively worsening shortness of breath/ exertional dyspnea associated with chest pain, weakness and diaphoresis. Of note, patient drove back from [place] within the past 24 hours. States he had been having a productive cough and profound substernal chest pain with activity. Also endorsing a 20 pound weight gain and peripheral edema. Patient had a pharmacological stress test 10 years ago, which reports was negative. Of note, patient seen and evaluated in the office of Dr Delgado prior to ED presentation and was found to be tachycardic. Collateral information gathered from EMS revealed he was given Adenosine 6mg followed by 12 mg prior to arrival.

## 2022-06-20 NOTE — H&P ADULT - ASSESSMENT
61 year old male patient with acute respiratory failure         -Admit to tele        # Acute hypoxic respiratory failure  -patient with likely COPD exacerbation but still have consideration for new onset CHF  -will give solumedrol and FOX  -supplemental oxygen as needed  -trial of Lasix( continue if clinically warranted)  -get morning echo  -Cardiology to evaluate pt    # Chest pain/ Arrythmia  -patient with exertional chest pain and diaphoresis  - had tachycardic arrythmia in the field and was given Adenosine x 2  -trend troponin  -get morning echo  -serial EKG  -Cardiology to evaluate pt    # Hypokalemia  -repleted    # DM2  -give ISS  -on lantus    # Constipation  -give miralax    # Debility/ weakness  - get PT evaluation    # DVT ppx  -heparin sq

## 2022-06-20 NOTE — ED PROVIDER NOTE - NS ED ROS FT
Constitutional: No fever or  chills, +weakness  Eyes: No visual changes  HEENT: No throat pain  CV: No chest pain  Resp: +SOB no cough  GI: No abd pain, nausea or vomiting  : No dysuria  MSK: No musculoskeletal pain  Skin: No rash  Neuro: No headache

## 2022-06-20 NOTE — DATA REVIEWED
[FreeTextEntry1] : Spirometric analysis reveals a significant degree of obstruction.  Bronchodilator reactivity is not demonstrated.  \par \par EKG shows tachycardia at a rate of 150.  This may represent atrial flutter with 2-1 block.  There is a left anterior hemiblock noted.  There are no acute T wave changes.

## 2022-06-20 NOTE — ED PROVIDER NOTE - NS_ ATTENDINGSCRIBEDETAILS _ED_A_ED_FT
I, Juan Reece MD,  performed the initial face to face bedside interview with this patient regarding history of present illness, review of symptoms and relevant past medical, social and family history.  I completed an independent physical examination.  I was the initial provider who evaluated this patient.   I personally saw the patient and performed a substantive portion of the visit including all aspects of the medical decision making.  The history, relevant review of systems, past medical and surgical history, medical decision making, and physical examination was documented by the scribe in my presence and I attest to the accuracy of the documentation.

## 2022-06-20 NOTE — H&P ADULT - NSHPPHYSICALEXAM_GEN_ALL_CORE
Vital Signs Last 24 Hrs  T(C): 36.5 (20 Jun 2022 16:59), Max: 37 (20 Jun 2022 13:37)  T(F): 97.7 (20 Jun 2022 16:59), Max: 98.6 (20 Jun 2022 13:37)  HR: 92 (20 Jun 2022 16:59) (92 - 110)  BP: 112/64 (20 Jun 2022 16:59) (102/63 - 119/79)  BP(mean): 72 (20 Jun 2022 15:05) (72 - 72)  RR: 18 (20 Jun 2022 16:59) (18 - 21)  SpO2: 94% (20 Jun 2022 16:59) (94% - 96%)

## 2022-06-20 NOTE — ED PROVIDER NOTE - CARE PLAN
1 Principal Discharge DX:	Shortness of breath  Secondary Diagnosis:	COPD exacerbation  Secondary Diagnosis:	Palpitations

## 2022-06-20 NOTE — PHYSICAL EXAM
[No JVD] : no jugular venous distention [Supple] : supple [Normal S1, S2] : normal S1 and S2 [No Murmur] : no murmur heard [No Extremity Clubbing/Cyanosis] : no extremity clubbing/cyanosis [Soft] : abdomen soft [Normal Bowel Sounds] : normal bowel sounds [Normal Posterior Cervical Nodes] : no posterior cervical lymphadenopathy [Normal Anterior Cervical Nodes] : no anterior cervical lymphadenopathy [No CVA Tenderness] : no CVA  tenderness [Normal Affect] : the affect was normal [Normal Insight/Judgement] : insight and judgment were intact [de-identified] : Diaphoretic, ill-appearing male in mild distress [de-identified] : The pharynx is narrowed and crowded posteriorly. [de-identified] : There are diffuse inspiratory and expiratory wheezes with a prolongation of the expiratory phase.  Few rhonchi are noted.  There are diminished breath sounds at the bases [de-identified] : Tachycardic rhythm [de-identified] : There is 2+ edema bilaterally

## 2022-06-20 NOTE — ED ADULT TRIAGE NOTE - CHIEF COMPLAINT QUOTE
pt presents to ED brought in by ambulance from MD Pisano office due to SOB diaphoretic pt found to be SVT by EMS pt given Adenosine 6mg then 12mg  by EMS as per MD Pisano pt drove from [place] to [place] for his doctors

## 2022-06-20 NOTE — HISTORY OF PRESENT ILLNESS
[FreeTextEntry8] : The patient presents today for an acute evaluation.  He was last seen in this office over a year ago.  The patient currently lives in [place].\par \par The patient states, that he was in his usual state of health, until approximately 2 weeks ago, when he noted the onset of increasing shortness of breath.  The symptoms appear to worsen acutely over the past few days.  He has been wheezing diffusely.  He is using Symbicort 2 puffs once a day.  He has also been using his nebulizer more frequently with DuoNeb, twice a day.  The cough has been productive of green sputum.  He denies any fevers or chills.  He has developed sweats.  He states that he had pain in the left side of his chest which occasionally radiated to his left shoulder.  He states that the shoulder discomfort may have been due to a musculoskeletal process with his rotator cuff.\par \par The patient was seen by a physician in [place] on 6/16/2022.  He was told that he needed to be admitted.  The patient did go down to the emergency room at at [facility], where he sat in the waiting room for almost 24 hours.  He was never evaluated.  At that point, he got in his car on the 17 June, and he began driving to [place].  He got to this area on Sunday, 6/19.  He called the office for an emergent evaluation today.\par \par The patient states that he did take a COVID swab on 6/19/2022 which was negative.  He is denying any chest pressure or palpitations at this time.  He notes that he has gained 20 pounds in the past few weeks.  He is a diabetic.  He has been taking multiple medications for that problem as well.  An EKG reveals supraventricular tachycardia at 150 bpm.  He is now being transported to the emergency room via EMS.  The emergency room has been notified.

## 2022-06-20 NOTE — ED PROVIDER NOTE - PHYSICAL EXAMINATION
Constitutional: NAD AAOx3  Eyes: PERRLA EOMI  Head: Normocephalic atraumatic  Mouth: MMM  Cardiac: regular rate  + mild b/l lower extremities swelling   Resp: diffuse wheezing b/l   GI: Abd s/nt/nd  Neuro: CN2-12 intact  Skin: No visible rashes Constitutional: mild distress AAOx3  Eyes: PERRLA EOMI  Head: Normocephalic atraumatic  Mouth: MMM  Cardiac: tachycardic + mild b/l lower extremities swelling   Resp: diffuse wheezing b/l   GI: Abd s/nt/nd  Neuro: CN2-12 intact  Skin: No visible rashes

## 2022-06-20 NOTE — ED PROVIDER NOTE - CLINICAL SUMMARY MEDICAL DECISION MAKING FREE TEXT BOX
61 y/o M w/ PMHx of COPD DM sleep apnea, prostate ca present ED from Dr. Delgado office for SOB and weakness in office was diaphoretic and in a flutter w/ block then found to be in SVT by EMS was given adenosine and symptoms improved. Exam here w/ diffuse b/l wheezing and mild b/l lower extremity swelling. Will r/o ACS, DVT, PE likely COPD exacerbation and admit.

## 2022-06-21 DIAGNOSIS — J44.1 CHRONIC OBSTRUCTIVE PULMONARY DISEASE WITH (ACUTE) EXACERBATION: ICD-10-CM

## 2022-06-21 DIAGNOSIS — R07.9 CHEST PAIN, UNSPECIFIED: ICD-10-CM

## 2022-06-21 LAB
A1C WITH ESTIMATED AVERAGE GLUCOSE RESULT: 9.3 % — HIGH (ref 4–5.6)
ALBUMIN SERPL ELPH-MCNC: 3.3 G/DL — SIGNIFICANT CHANGE UP (ref 3.3–5)
ALP SERPL-CCNC: 54 U/L — SIGNIFICANT CHANGE UP (ref 40–120)
ALT FLD-CCNC: 33 U/L — SIGNIFICANT CHANGE UP (ref 12–78)
ANION GAP SERPL CALC-SCNC: 9 MMOL/L — SIGNIFICANT CHANGE UP (ref 5–17)
APPEARANCE UR: CLEAR — SIGNIFICANT CHANGE UP
AST SERPL-CCNC: 13 U/L — LOW (ref 15–37)
BASOPHILS # BLD AUTO: 0.01 K/UL — SIGNIFICANT CHANGE UP (ref 0–0.2)
BASOPHILS NFR BLD AUTO: 0.1 % — SIGNIFICANT CHANGE UP (ref 0–2)
BILIRUB SERPL-MCNC: 0.5 MG/DL — SIGNIFICANT CHANGE UP (ref 0.2–1.2)
BILIRUB UR-MCNC: NEGATIVE — SIGNIFICANT CHANGE UP
BUN SERPL-MCNC: 31 MG/DL — HIGH (ref 7–23)
CALCIUM SERPL-MCNC: 9.1 MG/DL — SIGNIFICANT CHANGE UP (ref 8.5–10.1)
CHLORIDE SERPL-SCNC: 98 MMOL/L — SIGNIFICANT CHANGE UP (ref 96–108)
CO2 SERPL-SCNC: 27 MMOL/L — SIGNIFICANT CHANGE UP (ref 22–31)
COLOR SPEC: YELLOW — SIGNIFICANT CHANGE UP
CREAT SERPL-MCNC: 1.25 MG/DL — SIGNIFICANT CHANGE UP (ref 0.5–1.3)
DIFF PNL FLD: NEGATIVE — SIGNIFICANT CHANGE UP
EGFR: 65 ML/MIN/1.73M2 — SIGNIFICANT CHANGE UP
EOSINOPHIL # BLD AUTO: 0.01 K/UL — SIGNIFICANT CHANGE UP (ref 0–0.5)
EOSINOPHIL NFR BLD AUTO: 0.1 % — SIGNIFICANT CHANGE UP (ref 0–6)
ESTIMATED AVERAGE GLUCOSE: 220 MG/DL — HIGH (ref 68–114)
GLUCOSE SERPL-MCNC: 197 MG/DL — HIGH (ref 70–99)
GLUCOSE UR QL: 1000 MG/DL
HCT VFR BLD CALC: 43.5 % — SIGNIFICANT CHANGE UP (ref 39–50)
HCV AB S/CO SERPL IA: 0.09 S/CO — SIGNIFICANT CHANGE UP (ref 0–0.99)
HCV AB SERPL-IMP: SIGNIFICANT CHANGE UP
HGB BLD-MCNC: 14.6 G/DL — SIGNIFICANT CHANGE UP (ref 13–17)
IMM GRANULOCYTES NFR BLD AUTO: 0.5 % — SIGNIFICANT CHANGE UP (ref 0–1.5)
INR BLD: 1.09 RATIO — SIGNIFICANT CHANGE UP (ref 0.88–1.16)
KETONES UR-MCNC: ABNORMAL
LEUKOCYTE ESTERASE UR-ACNC: NEGATIVE — SIGNIFICANT CHANGE UP
LYMPHOCYTES # BLD AUTO: 0.72 K/UL — LOW (ref 1–3.3)
LYMPHOCYTES # BLD AUTO: 9.1 % — LOW (ref 13–44)
MCHC RBC-ENTMCNC: 29.3 PG — SIGNIFICANT CHANGE UP (ref 27–34)
MCHC RBC-ENTMCNC: 33.6 GM/DL — SIGNIFICANT CHANGE UP (ref 32–36)
MCV RBC AUTO: 87.2 FL — SIGNIFICANT CHANGE UP (ref 80–100)
MONOCYTES # BLD AUTO: 0.66 K/UL — SIGNIFICANT CHANGE UP (ref 0–0.9)
MONOCYTES NFR BLD AUTO: 8.3 % — SIGNIFICANT CHANGE UP (ref 2–14)
NEUTROPHILS # BLD AUTO: 6.51 K/UL — SIGNIFICANT CHANGE UP (ref 1.8–7.4)
NEUTROPHILS NFR BLD AUTO: 81.9 % — HIGH (ref 43–77)
NITRITE UR-MCNC: NEGATIVE — SIGNIFICANT CHANGE UP
PH UR: 5 — SIGNIFICANT CHANGE UP (ref 5–8)
PLATELET # BLD AUTO: 309 K/UL — SIGNIFICANT CHANGE UP (ref 150–400)
POTASSIUM SERPL-MCNC: 3.3 MMOL/L — LOW (ref 3.5–5.3)
POTASSIUM SERPL-SCNC: 3.3 MMOL/L — LOW (ref 3.5–5.3)
PROT SERPL-MCNC: 6.7 GM/DL — SIGNIFICANT CHANGE UP (ref 6–8.3)
PROT UR-MCNC: NEGATIVE — SIGNIFICANT CHANGE UP
PROTHROM AB SERPL-ACNC: 12.6 SEC — SIGNIFICANT CHANGE UP (ref 10.5–13.4)
RBC # BLD: 4.99 M/UL — SIGNIFICANT CHANGE UP (ref 4.2–5.8)
RBC # FLD: 13.4 % — SIGNIFICANT CHANGE UP (ref 10.3–14.5)
SODIUM SERPL-SCNC: 134 MMOL/L — LOW (ref 135–145)
SP GR SPEC: 1.01 — SIGNIFICANT CHANGE UP (ref 1.01–1.02)
TROPONIN I, HIGH SENSITIVITY RESULT: 14.92 NG/L — SIGNIFICANT CHANGE UP
UROBILINOGEN FLD QL: NEGATIVE — SIGNIFICANT CHANGE UP
WBC # BLD: 7.95 K/UL — SIGNIFICANT CHANGE UP (ref 3.8–10.5)
WBC # FLD AUTO: 7.95 K/UL — SIGNIFICANT CHANGE UP (ref 3.8–10.5)

## 2022-06-21 PROCEDURE — 93306 TTE W/DOPPLER COMPLETE: CPT | Mod: 26

## 2022-06-21 PROCEDURE — 93010 ELECTROCARDIOGRAM REPORT: CPT

## 2022-06-21 PROCEDURE — 99233 SBSQ HOSP IP/OBS HIGH 50: CPT

## 2022-06-21 PROCEDURE — 93970 EXTREMITY STUDY: CPT | Mod: 26

## 2022-06-21 PROCEDURE — 99255 IP/OBS CONSLTJ NEW/EST HI 80: CPT

## 2022-06-21 RX ORDER — POTASSIUM CHLORIDE 20 MEQ
10 PACKET (EA) ORAL
Refills: 0 | Status: DISCONTINUED | OUTPATIENT
Start: 2022-06-21 | End: 2022-06-21

## 2022-06-21 RX ORDER — INFLUENZA VIRUS VACCINE 15; 15; 15; 15 UG/.5ML; UG/.5ML; UG/.5ML; UG/.5ML
0.5 SUSPENSION INTRAMUSCULAR ONCE
Refills: 0 | Status: DISCONTINUED | OUTPATIENT
Start: 2022-06-21 | End: 2022-06-24

## 2022-06-21 RX ORDER — OXYCODONE AND ACETAMINOPHEN 5; 325 MG/1; MG/1
2 TABLET ORAL EVERY 6 HOURS
Refills: 0 | Status: DISCONTINUED | OUTPATIENT
Start: 2022-06-21 | End: 2022-06-24

## 2022-06-21 RX ORDER — INSULIN LISPRO 100/ML
VIAL (ML) SUBCUTANEOUS AT BEDTIME
Refills: 0 | Status: DISCONTINUED | OUTPATIENT
Start: 2022-06-21 | End: 2022-06-24

## 2022-06-21 RX ORDER — POTASSIUM CHLORIDE 20 MEQ
40 PACKET (EA) ORAL ONCE
Refills: 0 | Status: COMPLETED | OUTPATIENT
Start: 2022-06-21 | End: 2022-06-21

## 2022-06-21 RX ORDER — INSULIN LISPRO 100/ML
VIAL (ML) SUBCUTANEOUS
Refills: 0 | Status: DISCONTINUED | OUTPATIENT
Start: 2022-06-21 | End: 2022-06-24

## 2022-06-21 RX ORDER — AZITHROMYCIN 500 MG/1
500 TABLET, FILM COATED ORAL EVERY 24 HOURS
Refills: 0 | Status: DISCONTINUED | OUTPATIENT
Start: 2022-06-21 | End: 2022-06-22

## 2022-06-21 RX ADMIN — HEPARIN SODIUM 5000 UNIT(S): 5000 INJECTION INTRAVENOUS; SUBCUTANEOUS at 21:37

## 2022-06-21 RX ADMIN — SPIRONOLACTONE 25 MILLIGRAM(S): 25 TABLET, FILM COATED ORAL at 09:15

## 2022-06-21 RX ADMIN — HEPARIN SODIUM 5000 UNIT(S): 5000 INJECTION INTRAVENOUS; SUBCUTANEOUS at 09:16

## 2022-06-21 RX ADMIN — Medication 40 MILLIGRAM(S): at 14:48

## 2022-06-21 RX ADMIN — ATORVASTATIN CALCIUM 40 MILLIGRAM(S): 80 TABLET, FILM COATED ORAL at 21:38

## 2022-06-21 RX ADMIN — OXYCODONE AND ACETAMINOPHEN 2 TABLET(S): 5; 325 TABLET ORAL at 16:44

## 2022-06-21 RX ADMIN — GABAPENTIN 800 MILLIGRAM(S): 400 CAPSULE ORAL at 14:48

## 2022-06-21 RX ADMIN — Medication 4: at 11:53

## 2022-06-21 RX ADMIN — Medication 4: at 17:27

## 2022-06-21 RX ADMIN — INSULIN GLARGINE 50 UNIT(S): 100 INJECTION, SOLUTION SUBCUTANEOUS at 21:41

## 2022-06-21 RX ADMIN — Medication 40 MILLIEQUIVALENT(S): at 11:55

## 2022-06-21 RX ADMIN — BUDESONIDE AND FORMOTEROL FUMARATE DIHYDRATE 2 PUFF(S): 160; 4.5 AEROSOL RESPIRATORY (INHALATION) at 09:49

## 2022-06-21 RX ADMIN — AZITHROMYCIN 255 MILLIGRAM(S): 500 TABLET, FILM COATED ORAL at 14:48

## 2022-06-21 RX ADMIN — POLYETHYLENE GLYCOL 3350 17 GRAM(S): 17 POWDER, FOR SOLUTION ORAL at 09:17

## 2022-06-21 RX ADMIN — Medication 2: at 08:30

## 2022-06-21 RX ADMIN — Medication 40 MILLIGRAM(S): at 21:41

## 2022-06-21 RX ADMIN — GABAPENTIN 800 MILLIGRAM(S): 400 CAPSULE ORAL at 21:36

## 2022-06-21 RX ADMIN — ALBUTEROL 2 PUFF(S): 90 AEROSOL, METERED ORAL at 09:49

## 2022-06-21 RX ADMIN — Medication 4: at 21:40

## 2022-06-21 RX ADMIN — OXYCODONE AND ACETAMINOPHEN 2 TABLET(S): 5; 325 TABLET ORAL at 17:30

## 2022-06-21 RX ADMIN — ALBUTEROL 2 PUFF(S): 90 AEROSOL, METERED ORAL at 21:06

## 2022-06-21 RX ADMIN — LOSARTAN POTASSIUM 100 MILLIGRAM(S): 100 TABLET, FILM COATED ORAL at 09:16

## 2022-06-21 RX ADMIN — BUDESONIDE AND FORMOTEROL FUMARATE DIHYDRATE 2 PUFF(S): 160; 4.5 AEROSOL RESPIRATORY (INHALATION) at 21:07

## 2022-06-21 RX ADMIN — Medication 3 MILLIGRAM(S): at 01:35

## 2022-06-21 RX ADMIN — GABAPENTIN 800 MILLIGRAM(S): 400 CAPSULE ORAL at 05:49

## 2022-06-21 RX ADMIN — Medication 40 MILLIEQUIVALENT(S): at 09:18

## 2022-06-21 RX ADMIN — Medication 650 MILLIGRAM(S): at 04:08

## 2022-06-21 RX ADMIN — Medication 25 MILLIGRAM(S): at 09:16

## 2022-06-21 NOTE — PHYSICAL THERAPY INITIAL EVALUATION ADULT - GENERAL OBSERVATIONS, REHAB EVAL
Group Topic: BH Therapeutic Activity    Date: 7/1/2021  Start Time:  1:00 PM  End Time:  1:45 PM  Facilitators: Mariann Whitman    Focus: Mindfulness  Number in attendance: 8    Pt was invited to participate in guided meditation activity group incorporating breath and mindfulness. Benefits of meditation may include gaining a new perspective on stressful situations, building skills to manage stress, increasing self-awareness, and relaxation.       Method: Group  Attendance: Present  Participation: Active  Patient Response: Appropriate feedback and Attentive  Mood: Normal  Affect: Type: Euthymic (normal mood)   Range: Full (normal)   Congruency: Congruent   Stability: Stable  Behavior/Socialization: Appropriate to group, Cooperative and Engaged  Thought Process: Tracking  Task Performance: Follows directions  Patient Evaluation: Independent - full participation     Pt participated in activity group and was appropriate while sharing with the group.    Mariann Whitman M.Ed., [facility], RYT    
Pt found sitting upright at edge of bed in NAD, agreeable to participate in PT Evaluation.

## 2022-06-21 NOTE — PHYSICAL THERAPY INITIAL EVALUATION ADULT - PERTINENT HX OF CURRENT PROBLEM, REHAB EVAL
62 y/o M with PMHx of COPD and DM presented to the ED complaining of progressively worsening SOB/ exertional dyspnea associated with chest pain, weakness and diaphoresis. Pt drove back from [place] on 06/17. Pt reports productive cough, substernal chest pain with activity, 20 lbs weight gain, and peripheral edema. Pt seen and evaluated by Dr Delgado prior to ED arrival and found to be tachycardic. EMS provided Adenosine 6mg then 12 mg prior to arrival

## 2022-06-21 NOTE — CONSULT NOTE ADULT - ASSESSMENT
NC O2 as needed,  Symbicort 160 strength, Albuterol inhaler prn, IV steroids- Solumedrol increase to 40 mg IV q 8 hours.   Levaquin.     Cardiology following appreciated. For echocardiogram.     Recommend LE dopplers, ? CTA chest.     Case d/w'ed primary team physician.     DVT prophylaxis, on heparin.

## 2022-06-21 NOTE — CONSULT NOTE ADULT - ASSESSMENT
61 year old man with the above PMH admitted with progressive shortness of breath, associated chest discomfort and weight gain.  There is no objective evidence for ACS.  His BNP is only mildly elevated.  He apparently had an SVT which was apparently broken by adenosine.  Rhythm currently stable.  All of his symptoms may be pulmonary in etiology as well as the SVT which could have been induced secondary to a pulmonary issue.  His Ekg is abnormal but will follow up with the TTE result to see if he did have a previous MI.

## 2022-06-21 NOTE — CONSULT NOTE ADULT - PROVIDER SPECIALTY LIST ADULT
Cardiology
Chief Complaint   Patient presents with   • Follow-up     HTN, anxiety/depression       Elieser Escobar male 41 year old presents with follow up of his anxiety and depression and his high blood pressure.       Current Medications    ACETAMINOPHEN (TYLENOL) 500 MG TABLET    Take 1,000 mg by mouth.    ALPRAZOLAM ER (ALPRAZOLAM XR) 1 MG EXTENDED RELEASE TABLET    Take 1 tablet by mouth daily.    IBUPROFEN (MOTRIN) 400 MG TABLET    Take 400 mg by mouth daily.    LOSARTAN (COZAAR) 100 MG TABLET    Take 1 tablet by mouth daily.    NAPROXEN SODIUM (ALEVE) 220 MG TABLET    Take 220 mg by mouth 2 times daily (with meals).     ALLERGIES:  No Known Allergies  Visit Vitals  BP (!) 139/102   Pulse 89   Resp 16   Wt 103.9 kg   BMI 35.87 kg/m²       GENERAL: Patient is awake, alert, and in no apparent distress. Appears stated age.  PSYCHIATRIC: Does not appear anxious or depressed.    Health Maintenance Summary     Topic Due On Due Status Completed On    IMMUNIZATION - DTaP/Tdap/Td May 15, 2028 Not Due May 15, 2018    Immunization-Influenza Sep 1, 2018 Not Due           ASSESSMENT AND PLAN:  Anxiety and depression  He has noticed a change on the alprazolam ER 1 mg from the 0.5 mg. He says that he is restless at night. He doesn't feel it is due to the medication. 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 He would like to alprazolam ER 1 mg. I will see him in 3 mos.     HTN (hypertension)  He was raised last time on the losartan. He has still some high even on the 100 mg. He says that if he rests he is in the low 90's. He is still not great. He will have this increased to losartan/HCTZ. Nurses BP in 2 weeks and blood work    He has had eczema on his hands. He will try using OTC hydrocortisone tid for the next week    Orders Placed This Encounter   • losartan-hydrochlorothiazide (HYZAAR) 100-12.5 MG per tablet   • ALPRAZolam ER (ALPRAZOLAM XR) 1 MG extended release tablet       
Pulmonology

## 2022-06-21 NOTE — PROGRESS NOTE ADULT - ASSESSMENT
61 year old male patient with acute respiratory failure         -Admit to tele        # Acute hypoxic respiratory failure/AECOPD/SVT s/p adenisone  - iv steroids  - karen/laba/montelukast  - get morning echo   -c ardio: no ischemic w/u planned at this time  - d/w pulm  - awaiting doppler  - dimer negative    # Chest pain/ Arrythmia  - patient with exertional chest pain and diaphoresis  - had tachycardic arrythmia in the field and was given Adenosine x 2  - trend troponin: neg x 3  -get morning echo: pending  -serial EKG      # Hypokalemia  -replete    # DM2  -give ISS  -on lantus    # Constipation  -give miralax    # Debility/ weakness  - get PT evaluation    # DVT ppx  -heparin sq

## 2022-06-21 NOTE — PHYSICAL THERAPY INITIAL EVALUATION ADULT - MODALITIES TREATMENT COMMENTS
. Patient is able to safely ambulate with CG without AD. Ambulates 200 feet requiring a 1 minute standing rest break due to SOB.

## 2022-06-21 NOTE — PATIENT PROFILE ADULT - FALL HARM RISK - HARM RISK INTERVENTIONS
Communicate Risk of Fall with Harm to all staff/Reinforce activity limits and safety measures with patient and family/Sit up slowly, dangle for a short time, stand at bedside before walking/Tailored Fall Risk Interventions/Toileting schedule using arm’s reach rule for commode and bathroom/Use of alarms - bed, chair and/or voice tab/Visual Cue: Yellow wristband and red socks/Bed in lowest position, wheels locked, appropriate side rails in place/Call bell, personal items and telephone in reach/Instruct patient to call for assistance before getting out of bed or chair/Non-slip footwear when patient is out of bed/[place] to call system/Physically safe environment - no spills, clutter or unnecessary equipment/Purposeful Proactive Rounding/Room/bathroom lighting operational, light cord in reach

## 2022-06-21 NOTE — CONSULT NOTE ADULT - SUBJECTIVE AND OBJECTIVE BOX
CHIEF COMPLAINT: Patient is a 61y old  Male who presents with a chief complaint of Acute hypoxic respiratory failure  Chest pain (20 Jun 2022 17:09)      HPI: HPI:  61 year old male patient with pertinent past medial history noted for  COPD and DM presented to the ED complaining of a one week history of progressively worsening shortness of breath/ exertional dyspnea associated with chest pain, weakness and diaphoresis. Of note, patient drove back from [place] within the past 24 hours. States he had been having a productive cough and profound substernal chest pain with activity. Also endorsing a 20 pound weight gain and peripheral edema. Patient had a pharmacological stress test 10 years ago, which reports was negative. Of note, patient seen and evaluated in the office of Dr Delgado prior to ED presentation and was found to be tachycardic. Collateral information gathered from EMS revealed he was given Adenosine 6mg followed by 12 mg prior to arrival.         (20 Jun 2022 17:09)      PMHx: PAST MEDICAL & SURGICAL HISTORY:  History of COPD      DM (diabetes mellitus)      Asthma      S/P partial lobectomy of lung            Soc Hx:     FAMILY HISTORY:  FH: diabetes mellitus        Allergies: Allergies    No Known Allergies    Intolerances          REVIEW OF SYSTEMS:    As above  + chest pain and shortness of breath  No lightheadeness or syncope  No leg swelling  No palpitations  No claudication-like symptoms    Vital Signs Last 24 Hrs  T(C): 36.5 (21 Jun 2022 05:20), Max: 37 (20 Jun 2022 13:37)  T(F): 97.7 (21 Jun 2022 05:20), Max: 98.6 (20 Jun 2022 13:37)  HR: 87 (21 Jun 2022 05:20) (87 - 110)  BP: 131/79 (21 Jun 2022 05:20) (102/63 - 138/85)  BP(mean): 100 (20 Jun 2022 23:05) (72 - 100)  RR: 20 (21 Jun 2022 05:20) (18 - 21)  SpO2: 95% (21 Jun 2022 05:20) (91% - 98%)    I&O's Summary      CAPILLARY BLOOD GLUCOSE      POCT Blood Glucose.: 292 mg/dL (20 Jun 2022 22:28)  POCT Blood Glucose.: 108 mg/dL (20 Jun 2022 16:48)      PHYSICAL EXAM:   Patient in NAD  Neck: No JVD; Carotids:  2+ without bruits  Respiratory:  Clear to A&P  Cardiovascular: S1 and S2, regular rate and rhythm, no Murmurs, gallops or rubs  Gastrointestinal:  Soft, non-tender; BS positive  Extremities: No peripheral edema  Vascular: 2+ peripheral pulses  Neurological: A/O x 3, no focal deficits      MEDICATIONS:  MEDICATIONS  (STANDING):  atorvastatin 40 milliGRAM(s) Oral at bedtime  budesonide 160 MICROgram(s)/formoterol 4.5 MICROgram(s) Inhaler 2 Puff(s) Inhalation two times a day  dextrose 5%. 1000 milliLiter(s) (50 mL/Hr) IV Continuous <Continuous>  dextrose 5%. 1000 milliLiter(s) (100 mL/Hr) IV Continuous <Continuous>  dextrose 50% Injectable 25 Gram(s) IV Push once  dextrose 50% Injectable 12.5 Gram(s) IV Push once  dextrose 50% Injectable 25 Gram(s) IV Push once  gabapentin 800 milliGRAM(s) Oral three times a day  glucagon  Injectable 1 milliGRAM(s) IntraMuscular once  heparin   Injectable 5000 Unit(s) SubCutaneous every 12 hours  hydrochlorothiazide 25 milliGRAM(s) Oral daily  influenza   Vaccine 0.5 milliLiter(s) IntraMuscular once  insulin glargine Injectable (LANTUS) 50 Unit(s) SubCutaneous at bedtime  insulin lispro (ADMELOG) corrective regimen sliding scale   SubCutaneous three times a day before meals  levoFLOXacin IVPB 750 milliGRAM(s) IV Intermittent every 24 hours  losartan 100 milliGRAM(s) Oral daily  methylPREDNISolone sodium succinate Injectable 60 milliGRAM(s) IV Push daily  polyethylene glycol 3350 17 Gram(s) Oral daily  spironolactone 25 milliGRAM(s) Oral daily      LABS: All Labs Reviewed:  Blood Culture:   BNP Serum Pro-Brain Natriuretic Peptide: 222 pg/mL (06-20 @ 14:11)    CBC             WBC Count: 8.09 K/uL (06-20 @ 14:11)              Hemoglobin: 15.8 g/dL (06-20 @ 14:11)              Hematocrit: 46.4 % (06-20 @ 14:11)              Mean Cell Volume: 87.2 fl (06-20 @ 14:11)              Platelet Count - Automated: 307 K/uL (06-20 @ 14:11)                            Cardiac markers   Troponin I, High Sensitivity (06.20.22 @ 14:11) Troponin I, High Sensitivity Result: 23.52   Troponin I, High Sensitivity (06.20.22 @ 19:41) Troponin I, High Sensitivity Result: 30.93                                    Chems        Sodium, Serum: 140 mmol/L (06-20 @ 14:11)          Potassium, Serum: 3.4 mmol/L (06-20 @ 14:11)          Blood Urea Nitrogen, Serum: 17 mg/dL (06-20 @ 14:11)          Creatinine 1.12          Magnesium, Serum: 2.3 mg/dL (06-20 @ 14:11)          Protein Total, Serum: 6.3 gm/dL (06-20 @ 14:11)                  Calcium, Total Serum: 9.3 mg/dL (06-20 @ 14:11)                  Bilirubin Total, Serum: 0.6 mg/dL (06-20 @ 14:11)          Alanine Aminotransferase (ALT/SGPT): 35 U/L (06-20 @ 14:11)          Aspartate Aminotransferase (AST/SGOT): 15 U/L (06-20 @ 14:11)                 INR: 1.07 ratio (06-20 @ 14:11)             RADIOLOGY: < from: Xray Chest 1 View-PORTABLE IMMEDIATE (06.20.22 @ 13:36) >  IMPRESSION: Slight residual bibasilar findings.    < end of copied text >      EKG:  < from: 12 Lead ECG (06.20.22 @ 13:26) >  Sinus tachycardia  Left axis deviation  Low voltage QRS  Inferior infarct , age undetermined  Cannot rule out Anteroseptal infarct , age undetermined  Abnormal ECG    < end of copied text >    Telemetry:  sinus    ECHO:    
  HPI:  61 year old male patient with pertinent past medial history noted for  COPD and DM presented to the ED complaining of a one week history of progressively worsening shortness of breath/ exertional dyspnea associated with chest pain, weakness and diaphoresis. Of note, patient drove back from [place] within the past 24 hours. Had waited in an ER in [place] for 18 hours before that. States he had been having a productive cough and profound substernal chest pain with activity. Also endorsing a 20 pound weight gain and peripheral edema. Patient had a pharmacological stress test 10 years ago, which reports was negative. Of note, patient seen and evaluated in the office of Dr Delgado prior to ED presentation and was found to be tachycardic, felt to be in A flutter wih 2:1 block. Collateral information gathered from EMS revealed he was given Adenosine 6mg followed by 12 mg prior to arrival.    :  pt feeling better today.  SOB improving.  on NC O2. still has wheezing.          (2022 17:09)      PAST MEDICAL & SURGICAL HISTORY:  History of COPD      DM (diabetes mellitus)      Asthma      S/P partial lobectomy of lung          MEDICATIONS  (STANDING):  atorvastatin 40 milliGRAM(s) Oral at bedtime  budesonide 160 MICROgram(s)/formoterol 4.5 MICROgram(s) Inhaler 2 Puff(s) Inhalation two times a day  dextrose 5%. 1000 milliLiter(s) (50 mL/Hr) IV Continuous <Continuous>  dextrose 5%. 1000 milliLiter(s) (100 mL/Hr) IV Continuous <Continuous>  dextrose 50% Injectable 25 Gram(s) IV Push once  dextrose 50% Injectable 12.5 Gram(s) IV Push once  dextrose 50% Injectable 25 Gram(s) IV Push once  gabapentin 800 milliGRAM(s) Oral three times a day  glucagon  Injectable 1 milliGRAM(s) IntraMuscular once  heparin   Injectable 5000 Unit(s) SubCutaneous every 12 hours  hydrochlorothiazide 25 milliGRAM(s) Oral daily  influenza   Vaccine 0.5 milliLiter(s) IntraMuscular once  insulin glargine Injectable (LANTUS) 50 Unit(s) SubCutaneous at bedtime  insulin lispro (ADMELOG) corrective regimen sliding scale   SubCutaneous three times a day before meals  levoFLOXacin IVPB 750 milliGRAM(s) IV Intermittent every 24 hours  losartan 100 milliGRAM(s) Oral daily  methylPREDNISolone sodium succinate Injectable 40 milliGRAM(s) IV Push every 8 hours  polyethylene glycol 3350 17 Gram(s) Oral daily  spironolactone 25 milliGRAM(s) Oral daily    MEDICATIONS  (PRN):  acetaminophen     Tablet .. 650 milliGRAM(s) Oral every 6 hours PRN Temp greater or equal to 38C (100.4F), Mild Pain (1 - 3)  ALBUTerol    90 MICROgram(s) HFA Inhaler 2 Puff(s) Inhalation every 6 hours PRN Shortness of Breath and/or Wheezing  aluminum hydroxide/magnesium hydroxide/simethicone Suspension 30 milliLiter(s) Oral every 4 hours PRN Dyspepsia  dextrose Oral Gel 15 Gram(s) Oral once PRN Blood Glucose LESS THAN 70 milliGRAM(s)/deciliter  melatonin 3 milliGRAM(s) Oral at bedtime PRN Insomnia  ondansetron Injectable 4 milliGRAM(s) IV Push every 8 hours PRN Nausea and/or Vomiting      Allergies    No Known Allergies    Intolerances        SOCIAL HISTORY: Denies tobacco, etoh abuse or illicit drug use    FAMILY HISTORY:  FH: diabetes mellitus        Vital Signs Last 24 Hrs  T(C): 36.4 (2022 08:51), Max: 37 (2022 13:37)  T(F): 97.5 (2022 08:51), Max: 98.6 (2022 13:37)  HR: 81 (2022 08:51) (81 - 110)  BP: 128/73 (2022 08:51) (102/63 - 138/85)  BP(mean): 100 (2022 23:05) (72 - 100)  RR: 19 (2022 08:51) (18 - 21)  SpO2: 94% (2022 08:51) (91% - 98%)    REVIEW OF SYSTEMS:    CONSTITUTIONAL:  As per HPI.  HEENT:  Eyes:  No diplopia or blurred vision. ENT:  No earache, sore throat or runny nose.  CARDIOVASCULAR:  see above.  RESPIRATORY: see above  GASTROINTESTINAL:  No nausea, vomiting or diarrhea.  GENITOURINARY:  No dysuria, frequency or urgency.  MUSCULOSKELETAL:  As per HPI.  SKIN:  No change in skin, hair or nails.  NEUROLOGIC:  No paresthesias, fasciculations, seizures or weakness.  PSYCHIATRIC:  No disorder of thought or mood.  ENDOCRINE:  No heat or cold intolerance, polyuria or polydipsia.  HEMATOLOGICAL:  No easy bruising or bleedings:  .     PHYSICAL EXAMINATION:    GENERAL APPEARANCE:  Pt. is not currently dyspneic, in no distress. Pt. is alert, oriented, and pleasant.  HEENT:  Pupils are normal and react normally. No icterus. Mucous membranes well colored.  NECK:  Supple. No lymphadenopathy. Jugular venous pressure not elevated. Carotids equal.   HEART:   The cardiac impulse has a normal quality. Regular. Normal S1 and S2. There are no murmurs, rubs or gallops noted  CHEST:  Positive wheezing bilat.  Normal respiratory effort.  ABDOMEN:  Soft and nontender.   EXTREMITIES:  There is no cyanosis, clubbing.  1-2 plus LE edema with mild chronic stasis changes of skin.  SKIN:  No rash or significant lesions are noted.  Neuro: Alert, awake, and O x 3.      LABS:                        14.6   7.95  )-----------( 309      ( 2022 07:48 )             43.5     06-21    134<L>  |  98  |  31<H>  ----------------------------<  197<H>  3.3<L>   |  27  |  1.25    Ca    9.1      2022 07:48  Mg     2.3     06-20    TPro  6.7  /  Alb  3.3  /  TBili  0.5  /  DBili  x   /  AST  13<L>  /  ALT  33  /  AlkPhos  54  06-21    LIVER FUNCTIONS - ( 2022 07:48 )  Alb: 3.3 g/dL / Pro: 6.7 gm/dL / ALK PHOS: 54 U/L / ALT: 33 U/L / AST: 13 U/L / GGT: x           PT/INR - ( 2022 07:48 )   PT: 12.6 sec;   INR: 1.09 ratio         PTT - ( 2022 14:11 )  PTT:31.0 sec      Urinalysis Basic - ( 2022 00:05 )    Color: Yellow / Appearance: Clear / S.010 / pH: x  Gluc: x / Ketone: Trace  / Bili: Negative / Urobili: Negative   Blood: x / Protein: Negative / Nitrite: Negative   Leuk Esterase: Negative / RBC: x / WBC x   Sq Epi: x / Non Sq Epi: x / Bacteria: x          RADIOLOGY & ADDITIONAL STUDIES:       ACC: [id number] EXAM:  XR CHEST PORTABLE IMMED 1V                          PROCEDURE DATE:  2022          INTERPRETATION:  AP chest on 2022 at 1:34 PM. Patient has chest   pain.    Heart magnified by technique.    On 2011 there were basilar pleural pulmonary findings left   greater than right.    Present film shows improvement with slight residual.    IMPRESSION: Slight residual bibasilar findings.

## 2022-06-21 NOTE — PHYSICAL THERAPY INITIAL EVALUATION ADULT - ADDITIONAL COMMENTS
Patient lives at home alone in [place], 6 steps in home. Patient returned to [place] to see his MD, but plans to return to his home in [place] via Car or Plane when medically stable. Independent in all ADLs, able to drive, and ride Motorcycles.

## 2022-06-21 NOTE — PROGRESS NOTE ADULT - SUBJECTIVE AND OBJECTIVE BOX
Patient is a 61y old  Male who presents with a chief complaint of Acute hypoxic respiratory failure  Chest pain (21 Jun 2022 09:05)      SUBJECTIVE:   HPI:  61 year old male patient with pertinent past medial history noted for  COPD and DM non compliant with doctor visits,  presented to the ED complaining of a one week history of progressively worsening shortness of breath/ exertional dyspnea associated with chest pain, weakness and diaphoresis. Trial of steroids and bax was tried by his pcp in [place] and did not provide relief. States he is using his neb more than usual.  Of note, patient drove back from [place] within the past 24 hours. States he had been having a productive cough and profound substernal chest pain with activity. Also endorsing a 20 pound weight gain and peripheral edema. Patient had a pharmacological stress test 10 years ago, which reports was negative. Of note, patient seen and evaluated in the office of Dr Delgado prior to ED presentation and was found to be tachycardic. Collateral information gathered from EMS revealed he was given Adenosine 6mg followed by 12 mg prior to arrival.      sub: feels better and not wheezing as much         (20 Jun 2022 17:09)        REVIEW OF SYSTEMS:    CONSTITUTIONAL: No weakness, fevers or chills  EYES/ENT: No visual changes;  No vertigo or throat pain   NECK: No pain or stiffness  RESPIRATORY: No cough, wheezing, hemoptysis; No shortness of breath  CARDIOVASCULAR: No chest pain or palpitations  GASTROINTESTINAL: No abdominal or epigastric pain. No nausea, vomiting, or hematemesis; No diarrhea or constipation. No melena or hematochezia.  GENITOURINARY: No dysuria, frequency or hematuria  NEUROLOGICAL: No numbness or weakness  SKIN: No itching, burning, rashes, or lesions   All other review of systems is negative unless indicated above      ICU Vital Signs Last 24 Hrs  T(C): 36.4 (21 Jun 2022 08:51), Max: 37 (20 Jun 2022 13:37)  T(F): 97.5 (21 Jun 2022 08:51), Max: 98.6 (20 Jun 2022 13:37)  HR: 81 (21 Jun 2022 08:51) (81 - 110)  BP: 128/73 (21 Jun 2022 08:51) (102/63 - 138/85)  BP(mean): 100 (20 Jun 2022 23:05) (72 - 100)  ABP: --  ABP(mean): --  RR: 19 (21 Jun 2022 08:51) (18 - 21)  SpO2: 94% (21 Jun 2022 08:51) (91% - 98%)        PHYSICAL EXAM:    Constitutional: NAD, awake and alert, obese, large neck  HEENT: PERR, EOMI, Normal Hearing, MMM  Neck: Soft and supple, No LAD, No JVD  Respiratory: wheezing b/l at bases  Cardiovascular: S1 and S2, regular rate and rhythm, no Murmurs, gallops or rubs  Gastrointestinal: Bowel Sounds present, soft, nontender, nondistended, no guarding, no rebound  Extremities: No peripheral edema  Vascular: 2+ peripheral pulses  Neurological: A/O x 3, no focal deficits  Musculoskeletal: 5/5 strength b/l upper and lower extremities  Skin: No rashes    MEDICATIONS:  MEDICATIONS  (STANDING):  atorvastatin 40 milliGRAM(s) Oral at bedtime  azithromycin  IVPB 500 milliGRAM(s) IV Intermittent every 24 hours  budesonide 160 MICROgram(s)/formoterol 4.5 MICROgram(s) Inhaler 2 Puff(s) Inhalation two times a day  dextrose 5%. 1000 milliLiter(s) (50 mL/Hr) IV Continuous <Continuous>  dextrose 5%. 1000 milliLiter(s) (100 mL/Hr) IV Continuous <Continuous>  dextrose 50% Injectable 25 Gram(s) IV Push once  dextrose 50% Injectable 12.5 Gram(s) IV Push once  dextrose 50% Injectable 25 Gram(s) IV Push once  gabapentin 800 milliGRAM(s) Oral three times a day  glucagon  Injectable 1 milliGRAM(s) IntraMuscular once  heparin   Injectable 5000 Unit(s) SubCutaneous every 12 hours  hydrochlorothiazide 25 milliGRAM(s) Oral daily  influenza   Vaccine 0.5 milliLiter(s) IntraMuscular once  insulin glargine Injectable (LANTUS) 50 Unit(s) SubCutaneous at bedtime  insulin lispro (ADMELOG) corrective regimen sliding scale   SubCutaneous three times a day before meals  insulin lispro (ADMELOG) corrective regimen sliding scale   SubCutaneous at bedtime  losartan 100 milliGRAM(s) Oral daily  methylPREDNISolone sodium succinate Injectable 40 milliGRAM(s) IV Push every 8 hours  polyethylene glycol 3350 17 Gram(s) Oral daily  spironolactone 25 milliGRAM(s) Oral daily      LABS: All Labs Reviewed:                        14.6   7.95  )-----------( 309      ( 21 Jun 2022 07:48 )             43.5     06-21    134<L>  |  98  |  31<H>  ----------------------------<  197<H>  3.3<L>   |  27  |  1.25    Ca    9.1      21 Jun 2022 07:48  Mg     2.3     06-20    TPro  6.7  /  Alb  3.3  /  TBili  0.5  /  DBili  x   /  AST  13<L>  /  ALT  33  /  AlkPhos  54  06-21    PT/INR - ( 21 Jun 2022 07:48 )   PT: 12.6 sec;   INR: 1.09 ratio         PTT - ( 20 Jun 2022 14:11 )  PTT:31.0 sec          Blood Culture:     RADIOLOGY/EKG: reviewed

## 2022-06-22 LAB
A1C WITH ESTIMATED AVERAGE GLUCOSE RESULT: 9.6 % — HIGH (ref 4–5.6)
ANION GAP SERPL CALC-SCNC: 8 MMOL/L — SIGNIFICANT CHANGE UP (ref 5–17)
BUN SERPL-MCNC: 31 MG/DL — HIGH (ref 7–23)
CALCIUM SERPL-MCNC: 9.1 MG/DL — SIGNIFICANT CHANGE UP (ref 8.5–10.1)
CHLORIDE SERPL-SCNC: 96 MMOL/L — SIGNIFICANT CHANGE UP (ref 96–108)
CO2 SERPL-SCNC: 27 MMOL/L — SIGNIFICANT CHANGE UP (ref 22–31)
CREAT SERPL-MCNC: 1.12 MG/DL — SIGNIFICANT CHANGE UP (ref 0.5–1.3)
EGFR: 74 ML/MIN/1.73M2 — SIGNIFICANT CHANGE UP
ESTIMATED AVERAGE GLUCOSE: 229 MG/DL — HIGH (ref 68–114)
GLUCOSE SERPL-MCNC: 334 MG/DL — HIGH (ref 70–99)
MAGNESIUM SERPL-MCNC: 2.6 MG/DL — SIGNIFICANT CHANGE UP (ref 1.6–2.6)
POTASSIUM SERPL-MCNC: 4.3 MMOL/L — SIGNIFICANT CHANGE UP (ref 3.5–5.3)
POTASSIUM SERPL-SCNC: 4.3 MMOL/L — SIGNIFICANT CHANGE UP (ref 3.5–5.3)
SODIUM SERPL-SCNC: 131 MMOL/L — LOW (ref 135–145)

## 2022-06-22 PROCEDURE — 99233 SBSQ HOSP IP/OBS HIGH 50: CPT

## 2022-06-22 PROCEDURE — 93010 ELECTROCARDIOGRAM REPORT: CPT

## 2022-06-22 RX ORDER — DILTIAZEM HCL 120 MG
5 CAPSULE, EXT RELEASE 24 HR ORAL ONCE
Refills: 0 | Status: COMPLETED | OUTPATIENT
Start: 2022-06-22 | End: 2022-06-22

## 2022-06-22 RX ORDER — AZITHROMYCIN 500 MG/1
500 TABLET, FILM COATED ORAL DAILY
Refills: 0 | Status: DISCONTINUED | OUTPATIENT
Start: 2022-06-22 | End: 2022-06-24

## 2022-06-22 RX ORDER — TIOTROPIUM BROMIDE 18 UG/1
1 CAPSULE ORAL; RESPIRATORY (INHALATION) ONCE
Refills: 0 | Status: COMPLETED | OUTPATIENT
Start: 2022-06-22 | End: 2022-06-22

## 2022-06-22 RX ORDER — SENNA PLUS 8.6 MG/1
2 TABLET ORAL AT BEDTIME
Refills: 0 | Status: DISCONTINUED | OUTPATIENT
Start: 2022-06-22 | End: 2022-06-24

## 2022-06-22 RX ADMIN — Medication 40 MILLIGRAM(S): at 05:10

## 2022-06-22 RX ADMIN — ATORVASTATIN CALCIUM 40 MILLIGRAM(S): 80 TABLET, FILM COATED ORAL at 21:55

## 2022-06-22 RX ADMIN — LOSARTAN POTASSIUM 100 MILLIGRAM(S): 100 TABLET, FILM COATED ORAL at 10:12

## 2022-06-22 RX ADMIN — SPIRONOLACTONE 25 MILLIGRAM(S): 25 TABLET, FILM COATED ORAL at 10:12

## 2022-06-22 RX ADMIN — AZITHROMYCIN 500 MILLIGRAM(S): 500 TABLET, FILM COATED ORAL at 18:59

## 2022-06-22 RX ADMIN — BUDESONIDE AND FORMOTEROL FUMARATE DIHYDRATE 2 PUFF(S): 160; 4.5 AEROSOL RESPIRATORY (INHALATION) at 09:07

## 2022-06-22 RX ADMIN — POLYETHYLENE GLYCOL 3350 17 GRAM(S): 17 POWDER, FOR SOLUTION ORAL at 10:12

## 2022-06-22 RX ADMIN — Medication 5 MILLIGRAM(S): at 02:12

## 2022-06-22 RX ADMIN — OXYCODONE AND ACETAMINOPHEN 2 TABLET(S): 5; 325 TABLET ORAL at 22:07

## 2022-06-22 RX ADMIN — GABAPENTIN 800 MILLIGRAM(S): 400 CAPSULE ORAL at 05:09

## 2022-06-22 RX ADMIN — OXYCODONE AND ACETAMINOPHEN 2 TABLET(S): 5; 325 TABLET ORAL at 15:30

## 2022-06-22 RX ADMIN — TIOTROPIUM BROMIDE 1 CAPSULE(S): 18 CAPSULE ORAL; RESPIRATORY (INHALATION) at 15:13

## 2022-06-22 RX ADMIN — Medication 4: at 07:52

## 2022-06-22 RX ADMIN — Medication 40 MILLIGRAM(S): at 10:13

## 2022-06-22 RX ADMIN — Medication 4: at 21:52

## 2022-06-22 RX ADMIN — Medication 3 MILLIGRAM(S): at 21:57

## 2022-06-22 RX ADMIN — ALBUTEROL 2 PUFF(S): 90 AEROSOL, METERED ORAL at 09:07

## 2022-06-22 RX ADMIN — INSULIN GLARGINE 50 UNIT(S): 100 INJECTION, SOLUTION SUBCUTANEOUS at 21:55

## 2022-06-22 RX ADMIN — Medication 10: at 17:21

## 2022-06-22 RX ADMIN — Medication 5 MILLIGRAM(S): at 01:24

## 2022-06-22 RX ADMIN — GABAPENTIN 800 MILLIGRAM(S): 400 CAPSULE ORAL at 21:51

## 2022-06-22 RX ADMIN — HEPARIN SODIUM 5000 UNIT(S): 5000 INJECTION INTRAVENOUS; SUBCUTANEOUS at 21:51

## 2022-06-22 RX ADMIN — Medication 40 MILLIGRAM(S): at 21:56

## 2022-06-22 RX ADMIN — HEPARIN SODIUM 5000 UNIT(S): 5000 INJECTION INTRAVENOUS; SUBCUTANEOUS at 10:12

## 2022-06-22 RX ADMIN — Medication 8: at 13:05

## 2022-06-22 RX ADMIN — BUDESONIDE AND FORMOTEROL FUMARATE DIHYDRATE 2 PUFF(S): 160; 4.5 AEROSOL RESPIRATORY (INHALATION) at 21:13

## 2022-06-22 RX ADMIN — GABAPENTIN 800 MILLIGRAM(S): 400 CAPSULE ORAL at 13:34

## 2022-06-22 RX ADMIN — SENNA PLUS 2 TABLET(S): 8.6 TABLET ORAL at 21:57

## 2022-06-22 RX ADMIN — Medication 25 MILLIGRAM(S): at 10:12

## 2022-06-22 RX ADMIN — OXYCODONE AND ACETAMINOPHEN 2 TABLET(S): 5; 325 TABLET ORAL at 06:30

## 2022-06-22 RX ADMIN — OXYCODONE AND ACETAMINOPHEN 2 TABLET(S): 5; 325 TABLET ORAL at 06:00

## 2022-06-22 NOTE — PROVIDER CONTACT NOTE (OTHER) - SITUATION
answering service aware of consult.
Pt came in with tachycardia and SOB, pt's heart rate has been below 100, now sustaining in 130s.
Pt came in with tachycardia and SOB, pt received 5mg of cardizem 35 minutes later the pts hr is still in the high 120's, 150's on ambulation.

## 2022-06-22 NOTE — PROVIDER CONTACT NOTE (OTHER) - ASSESSMENT
Pt walked to bathroom with assistance and his hr on ambulation went from 127 to 150 sustained for a few minutes. 5 minutes after rest the pts hr is 129.

## 2022-06-22 NOTE — PROGRESS NOTE ADULT - SUBJECTIVE AND OBJECTIVE BOX
Subjective:    Awake, alert. Improved. No sputum. Episodes of tachycardia last PM.    MEDICATIONS  (STANDING):  atorvastatin 40 milliGRAM(s) Oral at bedtime  azithromycin   Tablet 500 milliGRAM(s) Oral daily  budesonide 160 MICROgram(s)/formoterol 4.5 MICROgram(s) Inhaler 2 Puff(s) Inhalation two times a day  dextrose 5%. 1000 milliLiter(s) (50 mL/Hr) IV Continuous <Continuous>  dextrose 5%. 1000 milliLiter(s) (100 mL/Hr) IV Continuous <Continuous>  dextrose 50% Injectable 25 Gram(s) IV Push once  dextrose 50% Injectable 12.5 Gram(s) IV Push once  dextrose 50% Injectable 25 Gram(s) IV Push once  gabapentin 800 milliGRAM(s) Oral three times a day  glucagon  Injectable 1 milliGRAM(s) IntraMuscular once  heparin   Injectable 5000 Unit(s) SubCutaneous every 12 hours  hydrochlorothiazide 25 milliGRAM(s) Oral daily  influenza   Vaccine 0.5 milliLiter(s) IntraMuscular once  insulin glargine Injectable (LANTUS) 50 Unit(s) SubCutaneous at bedtime  insulin lispro (ADMELOG) corrective regimen sliding scale   SubCutaneous three times a day before meals  insulin lispro (ADMELOG) corrective regimen sliding scale   SubCutaneous at bedtime  losartan 100 milliGRAM(s) Oral daily  methylPREDNISolone sodium succinate Injectable 40 milliGRAM(s) IV Push every 12 hours  polyethylene glycol 3350 17 Gram(s) Oral daily  spironolactone 25 milliGRAM(s) Oral daily  tiotropium 18 MICROgram(s) Capsule 1 Capsule(s) Inhalation once    MEDICATIONS  (PRN):  acetaminophen     Tablet .. 650 milliGRAM(s) Oral every 6 hours PRN Temp greater or equal to 38C (100.4F), Mild Pain (1 - 3)  ALBUTerol    90 MICROgram(s) HFA Inhaler 2 Puff(s) Inhalation every 6 hours PRN Shortness of Breath and/or Wheezing  aluminum hydroxide/magnesium hydroxide/simethicone Suspension 30 milliLiter(s) Oral every 4 hours PRN Dyspepsia  dextrose Oral Gel 15 Gram(s) Oral once PRN Blood Glucose LESS THAN 70 milliGRAM(s)/deciliter  melatonin 3 milliGRAM(s) Oral at bedtime PRN Insomnia  ondansetron Injectable 4 milliGRAM(s) IV Push every 8 hours PRN Nausea and/or Vomiting  oxycodone    5 mG/acetaminophen 325 mG 2 Tablet(s) Oral every 6 hours PRN Severe Pain (7 - 10)      Allergies    No Known Allergies    Intolerances        Vital Signs Last 24 Hrs  T(C): 36.5 (2022 00:59), Max: 36.8 (2022 21:31)  T(F): 97.7 (2022 00:59), Max: 98.3 (2022 21:31)  HR: 135 (2022 00:59) (81 - 135)  BP: 104/68 (2022 00:59) (104/68 - 128/73)  BP(mean): 81 (2022 00:59) (81 - 81)  RR: 18 (2022 21:31) (18 - 19)  SpO2: 93% (:31) (93% - 94%)    PHYSICAL EXAMINATION:    NECK:  Supple. No lymphadenopathy. Jugular venous pressure not elevated.   HEART:   The cardiac impulse has a normal quality. Reg., Nl S1 and S2.    CHEST:  Chest with improved air entry. +exp wheezes with forced maneuvers. Normal respiratory effort.  ABDOMEN:  Soft and nontender.   EXTREMITIES:  There is 2+ edema.       LABS:                        14.6   7.95  )-----------( 309      ( 2022 07:48 )             43.5     06-21    134<L>  |  98  |  31<H>  ----------------------------<  197<H>  3.3<L>   |  27  |  1.25    Ca    9.1      2022 07:48  Mg     2.3     06-20    TPro  6.7  /  Alb  3.3  /  TBili  0.5  /  DBili  x   /  AST  13<L>  /  ALT  33  /  AlkPhos  54  06-21    PT/INR - ( 2022 07:48 )   PT: 12.6 sec;   INR: 1.09 ratio         PTT - ( 2022 14:11 )  PTT:31.0 sec  Urinalysis Basic - ( 2022 00:05 )    Color: Yellow / Appearance: Clear / S.010 / pH: x  Gluc: x / Ketone: Trace  / Bili: Negative / Urobili: Negative   Blood: x / Protein: Negative / Nitrite: Negative   Leuk Esterase: Negative / RBC: x / WBC x   Sq Epi: x / Non Sq Epi: x / Bacteria: x        RADIOLOGY & ADDITIONAL TESTS:    Assessment and Recommendation:   · Assessment	  - NC O2 as needed,-  - Symbicort 160 strength, Albuterol inhaler prn,-  - IV steroids- Solumedrol decrease to 40 mg IV q 8 hours.   Levaquin.   - Cardiology following appreciated. Echocardiogram-noted.   - Ambulate as shady  - Monitor daily weights and O2 sats  - Case d/w'ed primary team physician.   - Add Spiriva  - Change zithromax to oral  - Continue diuresis  - Consider Tx for tachyarrhythmia    DVT prophylaxis, on heparin.     Problem/Recommendation - 1:  ·  COPD with acute exacerbation.   Problem/Recommendation - 2:  ·  Chest pain.   Problem/Recommendation - 3:  ·  R/O COPD exacerbation.   Problem/Recommendation - 4:  ·  R/O Tachycardia.   Problem/Recommendation - 5:  ·  R/O Atrial arrhythmia.

## 2022-06-22 NOTE — PROGRESS NOTE ADULT - SUBJECTIVE AND OBJECTIVE BOX
CHIEF COMPLAINT: Patient is a 61y old  Male who presents with a chief complaint of Acute hypoxic respiratory failure  Chest pain (20 Jun 2022 17:09)      HPI: HPI:  61 year old male patient with pertinent past medial history noted for  COPD and DM presented to the ED complaining of a one week history of progressively worsening shortness of breath/ exertional dyspnea associated with chest pain, weakness and diaphoresis. Of note, patient drove back from [place] within the past 24 hours. States he had been having a productive cough and profound substernal chest pain with activity. Also endorsing a 20 pound weight gain and peripheral edema. Patient had a pharmacological stress test 10 years ago, which reports was negative. Of note, patient seen and evaluated in the office of Dr Delgado prior to ED presentation and was found to be tachycardic. Collateral information gathered from EMS revealed he was given Adenosine 6mg followed by 12 mg prior to arrival.         (20 Jun 2022 17:09)      PMHx: PAST MEDICAL & SURGICAL HISTORY:  History of COPD      DM (diabetes mellitus)      Asthma      S/P partial lobectomy of lung            Soc Hx:     FAMILY HISTORY:  FH: diabetes mellitus        Allergies: Allergies    No Known Allergies    Intolerances          REVIEW OF SYSTEMS:    As above  + chest pain and shortness of breath  No lightheadeness or syncope  No leg swelling  No palpitations  No claudication-like symptoms    ICU Vital Signs Last 24 Hrs  T(C): 36.5 (22 Jun 2022 00:59), Max: 36.8 (21 Jun 2022 21:31)  T(F): 97.7 (22 Jun 2022 00:59), Max: 98.3 (21 Jun 2022 21:31)  HR: 135 (22 Jun 2022 00:59) (81 - 135)  BP: 104/68 (22 Jun 2022 00:59) (104/68 - 128/73)  BP(mean): 81 (22 Jun 2022 00:59) (81 - 81)  ABP: --  ABP(mean): --  RR: 18 (21 Jun 2022 21:31) (18 - 19)  SpO2: 93% (21 Jun 2022 21:31) (93% - 94%)      I&O's Summary      CAPILLARY BLOOD GLUCOSE      POCT Blood Glucose.: 292 mg/dL (20 Jun 2022 22:28)  POCT Blood Glucose.: 108 mg/dL (20 Jun 2022 16:48)      PHYSICAL EXAM:   Patient in NAD  Neck: No JVD; Carotids:  2+ without bruits  Respiratory:  Clear to A&P  Cardiovascular: S1 and S2, regular rate and rhythm, no Murmurs, gallops or rubs  Gastrointestinal:  Soft, non-tender; BS positive  Extremities: No peripheral edema  Vascular: 2+ peripheral pulses  Neurological: A/O x 3, no focal deficits      MEDICATIONS  (STANDING):  atorvastatin 40 milliGRAM(s) Oral at bedtime  azithromycin  IVPB 500 milliGRAM(s) IV Intermittent every 24 hours  budesonide 160 MICROgram(s)/formoterol 4.5 MICROgram(s) Inhaler 2 Puff(s) Inhalation two times a day  dextrose 5%. 1000 milliLiter(s) (50 mL/Hr) IV Continuous <Continuous>  dextrose 5%. 1000 milliLiter(s) (100 mL/Hr) IV Continuous <Continuous>  dextrose 50% Injectable 25 Gram(s) IV Push once  dextrose 50% Injectable 12.5 Gram(s) IV Push once  dextrose 50% Injectable 25 Gram(s) IV Push once  gabapentin 800 milliGRAM(s) Oral three times a day  glucagon  Injectable 1 milliGRAM(s) IntraMuscular once  heparin   Injectable 5000 Unit(s) SubCutaneous every 12 hours  hydrochlorothiazide 25 milliGRAM(s) Oral daily  influenza   Vaccine 0.5 milliLiter(s) IntraMuscular once  insulin glargine Injectable (LANTUS) 50 Unit(s) SubCutaneous at bedtime  insulin lispro (ADMELOG) corrective regimen sliding scale   SubCutaneous three times a day before meals  insulin lispro (ADMELOG) corrective regimen sliding scale   SubCutaneous at bedtime  losartan 100 milliGRAM(s) Oral daily  methylPREDNISolone sodium succinate Injectable 40 milliGRAM(s) IV Push every 8 hours  polyethylene glycol 3350 17 Gram(s) Oral daily  spironolactone 25 milliGRAM(s) Oral daily        LABS: All Labs Reviewed:  Blood Culture:   BNP Serum Pro-Brain Natriuretic Peptide: 222 pg/mL (06-20 @ 14:11)    CBC             WBC Count: 8.09 K/uL (06-20 @ 14:11)              Hemoglobin: 15.8 g/dL (06-20 @ 14:11)              Hematocrit: 46.4 % (06-20 @ 14:11)              Mean Cell Volume: 87.2 fl (06-20 @ 14:11)              Platelet Count - Automated: 307 K/uL (06-20 @ 14:11)                            Cardiac markers   Troponin I, High Sensitivity (06.20.22 @ 14:11) Troponin I, High Sensitivity Result: 23.52  Troponin I, High Sensitivity (06.20.22 @ 19:41) Troponin I, High Sensitivity Result: 30.93                                    Chems        Sodium, Serum: 140 mmol/L (06-20 @ 14:11)          Potassium, Serum: 3.4 mmol/L (06-20 @ 14:11)          Blood Urea Nitrogen, Serum: 17 mg/dL (06-20 @ 14:11)          Creatinine 1.12          Magnesium, Serum: 2.3 mg/dL (06-20 @ 14:11)          Protein Total, Serum: 6.3 gm/dL (06-20 @ 14:11)                  Calcium, Total Serum: 9.3 mg/dL (06-20 @ 14:11)                  Bilirubin Total, Serum: 0.6 mg/dL (06-20 @ 14:11)          Alanine Aminotransferase (ALT/SGPT): 35 U/L (06-20 @ 14:11)          Aspartate Aminotransferase (AST/SGOT): 15 U/L (06-20 @ 14:11)                 INR: 1.07 ratio (06-20 @ 14:11)             RADIOLOGY: < from: Xray Chest 1 View-PORTABLE IMMEDIATE (06.20.22 @ 13:36) >  IMPRESSION: Slight residual bibasilar findings.    < end of copied text >      EKG:  < from: 12 Lead ECG (06.20.22 @ 13:26) >  Sinus tachycardia  Left axis deviation  Low voltage QRS  Inferior infarct , age undetermined  Cannot rule out Anteroseptal infarct , age undetermined  Abnormal ECG    < end of copied text >    Telemetry:  sinus    ECHO: < from: TTE Echo Complete w/o Contrast w/ Doppler (06.21.22 @ 11:15) >   Summary     The left ventricle is normal in size, wall motion and contractility.   Mild concentric left ventricular hypertrophy is present.   Estimated left ventricular ejection fraction is 60-65 %.   Trace tricuspid valve regurgitation. Normal PA systolic pressure.   Lipomatous atrial septal wall noted - a benign variant of minimal   clinical   significance.     Signature     ----------------------------------------------------------------   Electronically signed by Armando Purdy MD(Interpreting   physician) on 06/21/2022 06:02 PM    < end of copied text >         CHIEF COMPLAINT: Patient is a 61y old  Male who presents with a chief complaint of Acute hypoxic respiratory failure  Chest pain (20 Jun 2022 17:09)      HPI: HPI:  61 year old male patient with pertinent past medial history noted for  COPD and DM presented to the ED complaining of a one week history of progressively worsening shortness of breath/ exertional dyspnea associated with chest pain, weakness and diaphoresis. Of note, patient drove back from [place] within the past 24 hours. States he had been having a productive cough and profound substernal chest pain with activity. Also endorsing a 20 pound weight gain and peripheral edema. Patient had a pharmacological stress test 10 years ago, which reports was negative. Of note, patient seen and evaluated in the office of Dr Delgado prior to ED presentation and was found to be tachycardic. Collateral information gathered from EMS revealed he was given Adenosine 6mg followed by 12 mg prior to arrival.         (20 Jun 2022 17:09)      PMHx: PAST MEDICAL & SURGICAL HISTORY:  History of COPD      DM (diabetes mellitus)      Asthma      S/P partial lobectomy of lung            Soc Hx:     FAMILY HISTORY:  FH: diabetes mellitus        Allergies: Allergies    No Known Allergies    Intolerances    6/22/22:  Patient had rapid HRs overnight for which he received diltiazem        REVIEW OF SYSTEMS:    As above  + chest pain and shortness of breath  No lightheadeness or syncope  No leg swelling  No palpitations  No claudication-like symptoms    ICU Vital Signs Last 24 Hrs  T(C): 36.5 (22 Jun 2022 00:59), Max: 36.8 (21 Jun 2022 21:31)  T(F): 97.7 (22 Jun 2022 00:59), Max: 98.3 (21 Jun 2022 21:31)  HR: 135 (22 Jun 2022 00:59) (81 - 135)  BP: 104/68 (22 Jun 2022 00:59) (104/68 - 128/73)  BP(mean): 81 (22 Jun 2022 00:59) (81 - 81)  ABP: --  ABP(mean): --  RR: 18 (21 Jun 2022 21:31) (18 - 19)  SpO2: 93% (21 Jun 2022 21:31) (93% - 94%)      I&O's Summary      CAPILLARY BLOOD GLUCOSE      POCT Blood Glucose.: 292 mg/dL (20 Jun 2022 22:28)  POCT Blood Glucose.: 108 mg/dL (20 Jun 2022 16:48)      PHYSICAL EXAM:   Patient in NAD  Neck: No JVD; Carotids:  2+ without bruits  Respiratory:  Clear to A&P  Cardiovascular: S1 and S2, regular rate and rhythm, no Murmurs, gallops or rubs  Gastrointestinal:  Soft, non-tender; BS positive  Extremities: No peripheral edema  Vascular: 2+ peripheral pulses  Neurological: A/O x 3, no focal deficits      MEDICATIONS  (STANDING):  atorvastatin 40 milliGRAM(s) Oral at bedtime  azithromycin  IVPB 500 milliGRAM(s) IV Intermittent every 24 hours  budesonide 160 MICROgram(s)/formoterol 4.5 MICROgram(s) Inhaler 2 Puff(s) Inhalation two times a day  dextrose 5%. 1000 milliLiter(s) (50 mL/Hr) IV Continuous <Continuous>  dextrose 5%. 1000 milliLiter(s) (100 mL/Hr) IV Continuous <Continuous>  dextrose 50% Injectable 25 Gram(s) IV Push once  dextrose 50% Injectable 12.5 Gram(s) IV Push once  dextrose 50% Injectable 25 Gram(s) IV Push once  gabapentin 800 milliGRAM(s) Oral three times a day  glucagon  Injectable 1 milliGRAM(s) IntraMuscular once  heparin   Injectable 5000 Unit(s) SubCutaneous every 12 hours  hydrochlorothiazide 25 milliGRAM(s) Oral daily  influenza   Vaccine 0.5 milliLiter(s) IntraMuscular once  insulin glargine Injectable (LANTUS) 50 Unit(s) SubCutaneous at bedtime  insulin lispro (ADMELOG) corrective regimen sliding scale   SubCutaneous three times a day before meals  insulin lispro (ADMELOG) corrective regimen sliding scale   SubCutaneous at bedtime  losartan 100 milliGRAM(s) Oral daily  methylPREDNISolone sodium succinate Injectable 40 milliGRAM(s) IV Push every 8 hours  polyethylene glycol 3350 17 Gram(s) Oral daily  spironolactone 25 milliGRAM(s) Oral daily        LABS: All Labs Reviewed:  Blood Culture:   BNP Serum Pro-Brain Natriuretic Peptide: 222 pg/mL (06-20 @ 14:11)    CBC             WBC Count: 8.09 K/uL (06-20 @ 14:11)              Hemoglobin: 15.8 g/dL (06-20 @ 14:11)              Hematocrit: 46.4 % (06-20 @ 14:11)              Mean Cell Volume: 87.2 fl (06-20 @ 14:11)              Platelet Count - Automated: 307 K/uL (06-20 @ 14:11)                            Cardiac markers   Troponin I, High Sensitivity (06.20.22 @ 14:11) Troponin I, High Sensitivity Result: 23.52  Troponin I, High Sensitivity (06.20.22 @ 19:41) Troponin I, High Sensitivity Result: 30.93                                    Chems        Sodium, Serum: 140 mmol/L (06-20 @ 14:11)          Potassium, Serum: 3.4 mmol/L (06-20 @ 14:11)          Blood Urea Nitrogen, Serum: 17 mg/dL (06-20 @ 14:11)          Creatinine 1.12          Magnesium, Serum: 2.3 mg/dL (06-20 @ 14:11)          Protein Total, Serum: 6.3 gm/dL (06-20 @ 14:11)                  Calcium, Total Serum: 9.3 mg/dL (06-20 @ 14:11)                  Bilirubin Total, Serum: 0.6 mg/dL (06-20 @ 14:11)          Alanine Aminotransferase (ALT/SGPT): 35 U/L (06-20 @ 14:11)          Aspartate Aminotransferase (AST/SGOT): 15 U/L (06-20 @ 14:11)                 INR: 1.07 ratio (06-20 @ 14:11)             RADIOLOGY: < from: Xray Chest 1 View-PORTABLE IMMEDIATE (06.20.22 @ 13:36) >  IMPRESSION: Slight residual bibasilar findings.    < end of copied text >      EKG:  < from: 12 Lead ECG (06.20.22 @ 13:26) >  Sinus tachycardia  Left axis deviation  Low voltage QRS  Inferior infarct , age undetermined  Cannot rule out Anteroseptal infarct , age undetermined  Abnormal ECG    < end of copied text >    Telemetry:  sinus; had sinus tach versus mat last night    ECHO: < from: TTE Echo Complete w/o Contrast w/ Doppler (06.21.22 @ 11:15) >   Summary     The left ventricle is normal in size, wall motion and contractility.   Mild concentric left ventricular hypertrophy is present.   Estimated left ventricular ejection fraction is 60-65 %.   Trace tricuspid valve regurgitation. Normal PA systolic pressure.   Lipomatous atrial septal wall noted - a benign variant of minimal   clinical   significance.     Signature     ----------------------------------------------------------------   Electronically signed by Armando Purdy MD(Interpreting   physician) on 06/21/2022 06:02 PM    < end of copied text >

## 2022-06-22 NOTE — PROGRESS NOTE ADULT - ASSESSMENT
61 year old man with the above PMH admitted with progressive shortness of breath, associated chest discomfort and weight gain.  There is no objective evidence for ACS.  His BNP is only mildly elevated.  He apparently had an SVT which was apparently broken by adenosine.  Rhythm currently stable.  All of his symptoms may be pulmonary in etiology as well as the SVT which could have been induced secondary to a pulmonary issue.  His Ekg is abnormal but will follow up with the TTE result to see if he did have a previous MI.    6/22:  TTE reveals normal LV systolic function thus ruling out any previous MI.  It also supports the idea that his symptoms/tachykardia are most likely pulmonary in etiology.  Cardiac status stable.  Continue present management. 61 year old man with the above PMH admitted with progressive shortness of breath, associated chest discomfort and weight gain.  There is no objective evidence for ACS.  His BNP is only mildly elevated.  He apparently had an SVT which was apparently broken by adenosine.  Rhythm currently stable.  All of his symptoms may be pulmonary in etiology as well as the SVT which could have been induced secondary to a pulmonary issue.  His Ekg is abnormal but will follow up with the TTE result to see if he did have a previous MI.    6/22:  TTE reveals normal LV systolic function thus ruling out any previous MI.  It also supports the idea that his symptoms/tachykardia are most likely pulmonary in etiology.  Cardiac status stable.  Continue present management.  Consider PO diltiazem if rapid rates recur.

## 2022-06-22 NOTE — CHART NOTE - NSCHARTNOTEFT_GEN_A_CORE
Pt was sinus tachy in the 130's sustained on tele monitor. Denied cp, sob, hemodynamically stable    -EKG ordered which showed SVT  - performed vagal maneuvers and ordered 10 mg Cardizem IV push which broke SVT   -will continue to monitor

## 2022-06-22 NOTE — PROVIDER CONTACT NOTE (OTHER) - ACTION/TREATMENT ORDERED:
MD Osman will put in for another STAT dose of 5mg of cardizem
Dr Fuchs ordered a STAT EKG and said he will come to the unit.

## 2022-06-22 NOTE — PROGRESS NOTE ADULT - SUBJECTIVE AND OBJECTIVE BOX
Patient is a 61y old  Male who presents with a chief complaint of Acute hypoxic respiratory failure  Chest pain (2022 09:05)      SUBJECTIVE:   HPI:  61 year old male patient with pertinent past medial history noted for  COPD and DM non compliant with doctor visits,  presented to the ED complaining of a one week history of progressively worsening shortness of breath/ exertional dyspnea associated with chest pain, weakness and diaphoresis. Trial of steroids and bax was tried by his pcp in [place] and did not provide relief. States he is using his neb more than usual.  Of note, patient drove back from [place] within the past 24 hours. States he had been having a productive cough and profound substernal chest pain with activity. Also endorsing a 20 pound weight gain and peripheral edema. Patient had a pharmacological stress test 10 years ago, which reports was negative. Of note, patient seen and evaluated in the office of Dr Delgado prior to ED presentation and was found to be tachycardic. Collateral information gathered from EMS revealed he was given Adenosine 6mg followed by 12 mg prior to arrival.    Medical progress: Patient notes of feeling much better. Denies any HA, CP, SOB (much improved).   Complaints: no new complaints  State of mind: noraml  Care discussed with Dr. Delgado /  Care discussed with Dr. Cruz. Patient is anticipated to be in the hospital till then end of the week    REVIEW OF SYSTEMS:  CONSTITUTIONAL: No weakness, fevers or chills  EYES/ENT: No visual changes;  No vertigo or throat pain   NECK: No pain or stiffness  RESPIRATORY: No cough, wheezing, hemoptysis; No shortness of breath  CARDIOVASCULAR: No chest pain or palpitations  GASTROINTESTINAL: No abdominal or epigastric pain. No nausea, vomiting, or hematemesis; No diarrhea or constipation. No melena or hematochezia.  GENITOURINARY: No dysuria, frequency or hematuria  NEUROLOGICAL: No numbness or weakness  SKIN: No itching, burning, rashes, or lesions   All other review of systems is negative unless indicated above    PHYSICAL EXAM:  T(C): 36.5 (2022 00:59), Max: 36.8 (2022 21:31)  T(F): 97.7 (2022 00:59), Max: 98.3 (2022 21:31)  HR: 135 (2022 00:59) (84 - 135)  BP: 104/68 (2022 00:59) (104/68 - 125/73)  RR: 18 (2022 21:31) (18 - 18)  SpO2: 93% (2022 21:31) (93% - 93%)  Constitutional: NAD, awake and alert, obese, large neck  HEENT: PERR, EOMI, Normal Hearing, MMM  Neck: Soft and supple, No LAD, No JVD  Respiratory: no wheezing  Cardiovascular: S1 and S2, regular rate and rhythm, no Murmurs, gallops or rubs  Gastrointestinal: Bowel Sounds present, soft, nontender, nondistended, no guarding, no rebound  Extremities: No peripheral edema  Vascular: 2+ peripheral pulses  Neurological: A/O x 3, no focal deficits  Musculoskeletal: 5/5 strength b/l upper and lower extremities  Skin: No rashes      LABS:      CBC Full  -  ( 2022 07:48 )  WBC Count : 7.95 K/uL  RBC Count : 4.99 M/uL  Hemoglobin : 14.6 g/dL  Hematocrit : 43.5 %  Platelet Count - Automated : 309 K/uL  Mean Cell Volume : 87.2 fl  Mean Cell Hemoglobin : 29.3 pg  Mean Cell Hemoglobin Concentration : 33.6 gm/dL  Auto Neutrophil # : 6.51 K/uL  Auto Lymphocyte # : 0.72 K/uL  Auto Monocyte # : 0.66 K/uL  Auto Eosinophil # : 0.01 K/uL  Auto Basophil # : 0.01 K/uL  Auto Neutrophil % : 81.9 %  Auto Lymphocyte % : 9.1 %  Auto Monocyte % : 8.3 %  Auto Eosinophil % : 0.1 %  Auto Basophil % : 0.1 %    PT/INR - ( 2022 07:48 )   PT: 12.6 sec;   INR: 1.09 ratio         PTT - ( 2022 14:11 )  PTT:31.0 sec  Urinalysis Basic - ( 2022 00:05 )    Color: Yellow / Appearance: Clear / S.010 / pH: x  Gluc: x / Ketone: Trace  / Bili: Negative / Urobili: Negative   Blood: x / Protein: Negative / Nitrite: Negative   Leuk Esterase: Negative / RBC: x / WBC x   Sq Epi: x / Non Sq Epi: x / Bacteria: x      06-    134<L>  |  98  |  31<H>  ----------------------------<  197<H>  3.3<L>   |  27  |  1.25    Ca    9.1      2022 07:48  Mg     2.3     -    TPro  6.7  /  Alb  3.3  /  TBili  0.5  /  DBili  x   /  AST  13<L>  /  ALT  33  /  AlkPhos  54  -    61 year old male patient with acute respiratory failure         # Acute hypoxic respiratory failure / AECOPD / SVT / MAT s/p adenisone  - IV solumedrol  - karen / laba / montelukast  - get morning echo   - cardio: no ischemic w/u planned at this time /  raymond recommend outpatient monitoring to see if there are any silent arrhythmias  - awaiting doppler  - dimer negative  - incentive spirometer  - strongly recommended outpatient sleep study    # Chest pain/ Arrythmia  - now with chest pain free  - patient with exertional chest pain and diaphoresis  - had tachycardic arrythmia in the field and was given Adenosine x 2  - trend troponin: neg x 3  -get morning echo: pending  -serial EKG    # Hypokalemia  -replete    # pseudohyponatremia  # DM2  -give ISS  -on lantus    # Constipation  -give miralax    # Debility/ weakness  - get PT evaluation    # DVT ppx  -heparin sq

## 2022-06-23 LAB
ANION GAP SERPL CALC-SCNC: 6 MMOL/L — SIGNIFICANT CHANGE UP (ref 5–17)
BUN SERPL-MCNC: 27 MG/DL — HIGH (ref 7–23)
CALCIUM SERPL-MCNC: 9.5 MG/DL — SIGNIFICANT CHANGE UP (ref 8.5–10.1)
CHLORIDE SERPL-SCNC: 97 MMOL/L — SIGNIFICANT CHANGE UP (ref 96–108)
CO2 SERPL-SCNC: 31 MMOL/L — SIGNIFICANT CHANGE UP (ref 22–31)
CREAT SERPL-MCNC: 0.87 MG/DL — SIGNIFICANT CHANGE UP (ref 0.5–1.3)
EGFR: 97 ML/MIN/1.73M2 — SIGNIFICANT CHANGE UP
GLUCOSE SERPL-MCNC: 223 MG/DL — HIGH (ref 70–99)
HCT VFR BLD CALC: 43.8 % — SIGNIFICANT CHANGE UP (ref 39–50)
HGB BLD-MCNC: 15.2 G/DL — SIGNIFICANT CHANGE UP (ref 13–17)
MCHC RBC-ENTMCNC: 30 PG — SIGNIFICANT CHANGE UP (ref 27–34)
MCHC RBC-ENTMCNC: 34.7 GM/DL — SIGNIFICANT CHANGE UP (ref 32–36)
MCV RBC AUTO: 86.4 FL — SIGNIFICANT CHANGE UP (ref 80–100)
PLATELET # BLD AUTO: 311 K/UL — SIGNIFICANT CHANGE UP (ref 150–400)
POTASSIUM SERPL-MCNC: 3.9 MMOL/L — SIGNIFICANT CHANGE UP (ref 3.5–5.3)
POTASSIUM SERPL-SCNC: 3.9 MMOL/L — SIGNIFICANT CHANGE UP (ref 3.5–5.3)
RBC # BLD: 5.07 M/UL — SIGNIFICANT CHANGE UP (ref 4.2–5.8)
RBC # FLD: 13.3 % — SIGNIFICANT CHANGE UP (ref 10.3–14.5)
SODIUM SERPL-SCNC: 134 MMOL/L — LOW (ref 135–145)
WBC # BLD: 9.52 K/UL — SIGNIFICANT CHANGE UP (ref 3.8–10.5)
WBC # FLD AUTO: 9.52 K/UL — SIGNIFICANT CHANGE UP (ref 3.8–10.5)

## 2022-06-23 PROCEDURE — 99233 SBSQ HOSP IP/OBS HIGH 50: CPT

## 2022-06-23 RX ORDER — MULTIVIT WITH MIN/MFOLATE/K2 340-15/3 G
1 POWDER (GRAM) ORAL ONCE
Refills: 0 | Status: COMPLETED | OUTPATIENT
Start: 2022-06-23 | End: 2022-06-23

## 2022-06-23 RX ADMIN — HEPARIN SODIUM 5000 UNIT(S): 5000 INJECTION INTRAVENOUS; SUBCUTANEOUS at 09:28

## 2022-06-23 RX ADMIN — OXYCODONE AND ACETAMINOPHEN 2 TABLET(S): 5; 325 TABLET ORAL at 21:13

## 2022-06-23 RX ADMIN — Medication 4: at 21:06

## 2022-06-23 RX ADMIN — Medication 40 MILLIGRAM(S): at 21:07

## 2022-06-23 RX ADMIN — GABAPENTIN 800 MILLIGRAM(S): 400 CAPSULE ORAL at 21:07

## 2022-06-23 RX ADMIN — ALBUTEROL 2 PUFF(S): 90 AEROSOL, METERED ORAL at 21:05

## 2022-06-23 RX ADMIN — Medication 40 MILLIGRAM(S): at 09:27

## 2022-06-23 RX ADMIN — OXYCODONE AND ACETAMINOPHEN 2 TABLET(S): 5; 325 TABLET ORAL at 22:15

## 2022-06-23 RX ADMIN — LOSARTAN POTASSIUM 100 MILLIGRAM(S): 100 TABLET, FILM COATED ORAL at 09:27

## 2022-06-23 RX ADMIN — Medication 1 BOTTLE: at 09:27

## 2022-06-23 RX ADMIN — HEPARIN SODIUM 5000 UNIT(S): 5000 INJECTION INTRAVENOUS; SUBCUTANEOUS at 21:06

## 2022-06-23 RX ADMIN — AZITHROMYCIN 500 MILLIGRAM(S): 500 TABLET, FILM COATED ORAL at 09:26

## 2022-06-23 RX ADMIN — GABAPENTIN 800 MILLIGRAM(S): 400 CAPSULE ORAL at 06:34

## 2022-06-23 RX ADMIN — BUDESONIDE AND FORMOTEROL FUMARATE DIHYDRATE 2 PUFF(S): 160; 4.5 AEROSOL RESPIRATORY (INHALATION) at 08:45

## 2022-06-23 RX ADMIN — ALBUTEROL 2 PUFF(S): 90 AEROSOL, METERED ORAL at 08:45

## 2022-06-23 RX ADMIN — BUDESONIDE AND FORMOTEROL FUMARATE DIHYDRATE 2 PUFF(S): 160; 4.5 AEROSOL RESPIRATORY (INHALATION) at 21:05

## 2022-06-23 RX ADMIN — Medication 8: at 16:58

## 2022-06-23 RX ADMIN — Medication 25 MILLIGRAM(S): at 09:27

## 2022-06-23 RX ADMIN — OXYCODONE AND ACETAMINOPHEN 2 TABLET(S): 5; 325 TABLET ORAL at 12:44

## 2022-06-23 RX ADMIN — ATORVASTATIN CALCIUM 40 MILLIGRAM(S): 80 TABLET, FILM COATED ORAL at 21:08

## 2022-06-23 RX ADMIN — GABAPENTIN 800 MILLIGRAM(S): 400 CAPSULE ORAL at 14:35

## 2022-06-23 RX ADMIN — SPIRONOLACTONE 25 MILLIGRAM(S): 25 TABLET, FILM COATED ORAL at 09:27

## 2022-06-23 RX ADMIN — SENNA PLUS 2 TABLET(S): 8.6 TABLET ORAL at 21:07

## 2022-06-23 RX ADMIN — Medication 6: at 08:05

## 2022-06-23 RX ADMIN — INSULIN GLARGINE 50 UNIT(S): 100 INJECTION, SOLUTION SUBCUTANEOUS at 21:07

## 2022-06-23 NOTE — PROGRESS NOTE ADULT - ASSESSMENT
61 year old man with the above PMH admitted with progressive shortness of breath, associated chest discomfort and weight gain.  There is no objective evidence for ACS.  His BNP is only mildly elevated.  He apparently had an SVT which was apparently broken by adenosine.  Rhythm currently stable.  All of his symptoms may be pulmonary in etiology as well as the SVT which could have been induced secondary to a pulmonary issue.  His Ekg is abnormal but will follow up with the TTE result to see if he did have a previous MI.    6/22:  TTE reveals normal LV systolic function thus ruling out any previous MI.  It also supports the idea that his symptoms/tachykardia are most likely pulmonary in etiology.  Cardiac status stable.  Continue present management.  Consider PO diltiazem if rapid rates recur.     6/23/22:  Patient with no further episodes of tachykardia.  Continue pulmonary management.  Continue to monitor for arrhythmia.  As stated if there is a recurrent problem would start PO diltiazem

## 2022-06-23 NOTE — PROGRESS NOTE ADULT - SUBJECTIVE AND OBJECTIVE BOX
CHIEF COMPLAINT: Patient is a 61y old  Male who presents with a chief complaint of Acute hypoxic respiratory failure  Chest pain (20 Jun 2022 17:09)      HPI: HPI:  61 year old male patient with pertinent past medial history noted for  COPD and DM presented to the ED complaining of a one week history of progressively worsening shortness of breath/ exertional dyspnea associated with chest pain, weakness and diaphoresis. Of note, patient drove back from [place] within the past 24 hours. States he had been having a productive cough and profound substernal chest pain with activity. Also endorsing a 20 pound weight gain and peripheral edema. Patient had a pharmacological stress test 10 years ago, which reports was negative. Of note, patient seen and evaluated in the office of Dr Delgado prior to ED presentation and was found to be tachycardic. Collateral information gathered from EMS revealed he was given Adenosine 6mg followed by 12 mg prior to arrival.         (20 Jun 2022 17:09)      PMHx: PAST MEDICAL & SURGICAL HISTORY:  History of COPD      DM (diabetes mellitus)      Asthma      S/P partial lobectomy of lung            Soc Hx:     FAMILY HISTORY:  FH: diabetes mellitus        Allergies: Allergies    No Known Allergies    Intolerances    6/22/22:  Patient had rapid HRs overnight for which he received diltiazem        REVIEW OF SYSTEMS:    As above  + chest pain and shortness of breath  No lightheadeness or syncope  No leg swelling  No palpitations  No claudication-like symptoms    ICU Vital Signs Last 24 Hrs  T(C): 36.5 (22 Jun 2022 23:53), Max: 36.5 (22 Jun 2022 23:53)  T(F): 97.7 (22 Jun 2022 23:53), Max: 97.7 (22 Jun 2022 23:53)  HR: 74 (22 Jun 2022 23:53) (69 - 85)  BP: 143/85 (22 Jun 2022 23:53) (118/68 - 143/85)  BP(mean): --  ABP: --  ABP(mean): --  RR: 18 (22 Jun 2022 23:53) (18 - 18)  SpO2: 95% (22 Jun 2022 23:53) (95% - 99%)        I&O's Summary      CAPILLARY BLOOD GLUCOSE      POCT Blood Glucose.: 292 mg/dL (20 Jun 2022 22:28)  POCT Blood Glucose.: 108 mg/dL (20 Jun 2022 16:48)      PHYSICAL EXAM:   Patient in NAD  Neck: No JVD; Carotids:  2+ without bruits  Respiratory:  Clear to A&P  Cardiovascular: S1 and S2, regular rate and rhythm, no Murmurs, gallops or rubs  Gastrointestinal:  Soft, non-tender; BS positive  Extremities: No peripheral edema  Vascular: 2+ peripheral pulses  Neurological: A/O x 3, no focal deficits      MEDICATIONS  (STANDING):  atorvastatin 40 milliGRAM(s) Oral at bedtime  azithromycin   Tablet 500 milliGRAM(s) Oral daily  budesonide 160 MICROgram(s)/formoterol 4.5 MICROgram(s) Inhaler 2 Puff(s) Inhalation two times a day  dextrose 5%. 1000 milliLiter(s) (50 mL/Hr) IV Continuous <Continuous>  dextrose 5%. 1000 milliLiter(s) (100 mL/Hr) IV Continuous <Continuous>  dextrose 50% Injectable 25 Gram(s) IV Push once  dextrose 50% Injectable 12.5 Gram(s) IV Push once  dextrose 50% Injectable 25 Gram(s) IV Push once  gabapentin 800 milliGRAM(s) Oral three times a day  glucagon  Injectable 1 milliGRAM(s) IntraMuscular once  heparin   Injectable 5000 Unit(s) SubCutaneous every 12 hours  hydrochlorothiazide 25 milliGRAM(s) Oral daily  influenza   Vaccine 0.5 milliLiter(s) IntraMuscular once  insulin glargine Injectable (LANTUS) 50 Unit(s) SubCutaneous at bedtime  insulin lispro (ADMELOG) corrective regimen sliding scale   SubCutaneous three times a day before meals  insulin lispro (ADMELOG) corrective regimen sliding scale   SubCutaneous at bedtime  losartan 100 milliGRAM(s) Oral daily  methylPREDNISolone sodium succinate Injectable 40 milliGRAM(s) IV Push every 12 hours  polyethylene glycol 3350 17 Gram(s) Oral daily  senna 2 Tablet(s) Oral at bedtime  spironolactone 25 milliGRAM(s) Oral daily          LABS: All Labs Reviewed:  Blood Culture:   BNP Serum Pro-Brain Natriuretic Peptide: 222 pg/mL (06-20 @ 14:11)                        14.6   7.95  )-----------( 309      ( 21 Jun 2022 07:48 )             43.5     CBC             WBC Count: 8.09 K/uL (06-20 @ 14:11)              Hemoglobin: 15.8 g/dL (06-20 @ 14:11)              Hematocrit: 46.4 % (06-20 @ 14:11)              Mean Cell Volume: 87.2 fl (06-20 @ 14:11)              Platelet Count - Automated: 307 K/uL (06-20 @ 14:11)                            Cardiac markers   Troponin I, High Sensitivity (06.20.22 @ 14:11) Troponin I, High Sensitivity Result: 23.52  Troponin I, High Sensitivity (06.20.22 @ 19:41) Troponin I, High Sensitivity Result: 30.93           06-22    131<L>  |  96  |  31<H>  ----------------------------<  334<H>  4.3   |  27  |  1.12    Ca    9.1      22 Jun 2022 09:05  Mg     2.6     06-22    TPro  6.7  /  Alb  3.3  /  TBili  0.5  /  DBili  x   /  AST  13<L>  /  ALT  33  /  AlkPhos  54  06-21                           Chems        Sodium, Serum: 140 mmol/L (06-20 @ 14:11)          Potassium, Serum: 3.4 mmol/L (06-20 @ 14:11)          Blood Urea Nitrogen, Serum: 17 mg/dL (06-20 @ 14:11)          Creatinine 1.12          Magnesium, Serum: 2.3 mg/dL (06-20 @ 14:11)          Protein Total, Serum: 6.3 gm/dL (06-20 @ 14:11)                  Calcium, Total Serum: 9.3 mg/dL (06-20 @ 14:11)                  Bilirubin Total, Serum: 0.6 mg/dL (06-20 @ 14:11)          Alanine Aminotransferase (ALT/SGPT): 35 U/L (06-20 @ 14:11)          Aspartate Aminotransferase (AST/SGOT): 15 U/L (06-20 @ 14:11)                 INR: 1.07 ratio (06-20 @ 14:11)             RADIOLOGY: < from: Xray Chest 1 View-PORTABLE IMMEDIATE (06.20.22 @ 13:36) >  IMPRESSION: Slight residual bibasilar findings.    < end of copied text >      EKG:  < from: 12 Lead ECG (06.20.22 @ 13:26) >  Sinus tachycardia  Left axis deviation  Low voltage QRS  Inferior infarct , age undetermined  Cannot rule out Anteroseptal infarct , age undetermined  Abnormal ECG    < end of copied text >    Telemetry:  sinus:  Sinus 60-80    ECHO: < from: TTE Echo Complete w/o Contrast w/ Doppler (06.21.22 @ 11:15) >   Summary     The left ventricle is normal in size, wall motion and contractility.   Mild concentric left ventricular hypertrophy is present.   Estimated left ventricular ejection fraction is 60-65 %.   Trace tricuspid valve regurgitation. Normal PA systolic pressure.   Lipomatous atrial septal wall noted - a benign variant of minimal   clinical   significance.     Signature     ----------------------------------------------------------------   Electronically signed by Armando Purdy MD(Interpreting   physician) on 06/21/2022 06:02 PM    < end of copied text >

## 2022-06-23 NOTE — PROGRESS NOTE ADULT - SUBJECTIVE AND OBJECTIVE BOX
Subjective:      MEDICATIONS  (STANDING):  atorvastatin 40 milliGRAM(s) Oral at bedtime  azithromycin   Tablet 500 milliGRAM(s) Oral daily  budesonide 160 MICROgram(s)/formoterol 4.5 MICROgram(s) Inhaler 2 Puff(s) Inhalation two times a day  dextrose 5%. 1000 milliLiter(s) (50 mL/Hr) IV Continuous <Continuous>  dextrose 5%. 1000 milliLiter(s) (100 mL/Hr) IV Continuous <Continuous>  dextrose 50% Injectable 25 Gram(s) IV Push once  dextrose 50% Injectable 12.5 Gram(s) IV Push once  dextrose 50% Injectable 25 Gram(s) IV Push once  gabapentin 800 milliGRAM(s) Oral three times a day  glucagon  Injectable 1 milliGRAM(s) IntraMuscular once  heparin   Injectable 5000 Unit(s) SubCutaneous every 12 hours  hydrochlorothiazide 25 milliGRAM(s) Oral daily  influenza   Vaccine 0.5 milliLiter(s) IntraMuscular once  insulin glargine Injectable (LANTUS) 50 Unit(s) SubCutaneous at bedtime  insulin lispro (ADMELOG) corrective regimen sliding scale   SubCutaneous three times a day before meals  insulin lispro (ADMELOG) corrective regimen sliding scale   SubCutaneous at bedtime  losartan 100 milliGRAM(s) Oral daily  magnesium citrate Oral Solution 1 Bottle Oral once  methylPREDNISolone sodium succinate Injectable 40 milliGRAM(s) IV Push every 12 hours  polyethylene glycol 3350 17 Gram(s) Oral daily  senna 2 Tablet(s) Oral at bedtime  spironolactone 25 milliGRAM(s) Oral daily    MEDICATIONS  (PRN):  acetaminophen     Tablet .. 650 milliGRAM(s) Oral every 6 hours PRN Temp greater or equal to 38C (100.4F), Mild Pain (1 - 3)  ALBUTerol    90 MICROgram(s) HFA Inhaler 2 Puff(s) Inhalation every 6 hours PRN Shortness of Breath and/or Wheezing  aluminum hydroxide/magnesium hydroxide/simethicone Suspension 30 milliLiter(s) Oral every 4 hours PRN Dyspepsia  dextrose Oral Gel 15 Gram(s) Oral once PRN Blood Glucose LESS THAN 70 milliGRAM(s)/deciliter  melatonin 3 milliGRAM(s) Oral at bedtime PRN Insomnia  ondansetron Injectable 4 milliGRAM(s) IV Push every 8 hours PRN Nausea and/or Vomiting  oxycodone    5 mG/acetaminophen 325 mG 2 Tablet(s) Oral every 6 hours PRN Severe Pain (7 - 10)      Allergies    No Known Allergies    Intolerances        Vital Signs Last 24 Hrs  T(C): 36.4 (23 Jun 2022 08:35), Max: 36.5 (22 Jun 2022 23:53)  T(F): 97.5 (23 Jun 2022 08:35), Max: 97.7 (22 Jun 2022 23:53)  HR: 76 (23 Jun 2022 09:03) (66 - 85)  BP: 144/86 (23 Jun 2022 08:35) (126/74 - 144/86)  BP(mean): --  RR: 18 (23 Jun 2022 08:35) (18 - 18)  SpO2: 93% (23 Jun 2022 08:35) (93% - 95%)    PHYSICAL EXAMINATION:    NECK:  Supple. No lymphadenopathy. Jugular venous pressure not elevated.   HEART:   The cardiac impulse has a normal quality. Reg., Nl S1 and S2.   CHEST:  Chest iwith slight exp wheezes. Normal respiratory effort.  ABDOMEN:  Soft and nontender.   EXTREMITIES:  There is 1-2+ edema.       LABS:                        15.2   9.52  )-----------( 311      ( 23 Jun 2022 08:45 )             43.8     06-23    134<L>  |  97  |  27<H>  ----------------------------<  223<H>  3.9   |  31  |  0.87    Ca    9.5      23 Jun 2022 08:45  Mg     2.6     06-22            RADIOLOGY & ADDITIONAL TESTS:    Assessment and Recommendation:   · Assessment	  - NC O2 as needed,-  - Symbicort 160 strength, Albuterol inhaler prn,-  - IV steroids- Solumedrol maintain at 40 mg IV q 12 hours.     - Cardiology following appreciated. Echocardiogram-noted.   - Ambulate as shady  - Monitor daily weights and O2 sats  - Case d/w'ed primary team physician.   - Cont. Spiriva  - Change zithromax to oral  - Continue diuresis  - Consider Tx for tachyarrhythmia as per cardiology    DVT prophylaxis, on heparin.     Problem/Recommendation - 1:  ·  COPD with acute exacerbation.   Problem/Recommendation - 2:  ·  Chest pain.   Problem/Recommendation - 3:  ·  R/O COPD exacerbation.   Problem/Recommendation - 4:  ·  R/O Tachycardia.   Problem/Recommendation - 5:  ·  R/O Atrial arrhythmia.

## 2022-06-23 NOTE — PROGRESS NOTE ADULT - SUBJECTIVE AND OBJECTIVE BOX
Patient is a 61y old  Male who presents with a chief complaint of Acute hypoxic respiratory failure  Chest pain (2022 09:05)      SUBJECTIVE:   HPI:  61 year old male patient with pertinent past medial history noted for  COPD and DM non compliant with doctor visits,  presented to the ED complaining of a one week history of progressively worsening shortness of breath/ exertional dyspnea associated with chest pain, weakness and diaphoresis. Trial of steroids and bax was tried by his pcp in [place] and did not provide relief. States he is using his neb more than usual.  Of note, patient drove back from [place] within the past 24 hours. States he had been having a productive cough and profound substernal chest pain with activity. Also endorsing a 20 pound weight gain and peripheral edema. Patient had a pharmacological stress test 10 years ago, which reports was negative. Of note, patient seen and evaluated in the office of Dr Delgado prior to ED presentation and was found to be tachycardic. Collateral information gathered from EMS revealed he was given Adenosine 6mg followed by 12 mg prior to arrival.    Medical progress: Doing better. Denies any HA, CP, SOB. D/C planning in the am  Complaints: no new complaints  State of mind: noraml  Care discussed with Dr. Delgado /  Care discussed with Dr. Cruz. Patient is anticipated to be in the hospital till then end of the week    REVIEW OF SYSTEMS:  CONSTITUTIONAL: No weakness, fevers or chills  EYES/ENT: No visual changes;  No vertigo or throat pain   NECK: No pain or stiffness  RESPIRATORY: No cough, wheezing, hemoptysis; No shortness of breath  CARDIOVASCULAR: No chest pain or palpitations  GASTROINTESTINAL: No abdominal or epigastric pain. No nausea, vomiting, or hematemesis; No diarrhea or constipation. No melena or hematochezia.  GENITOURINARY: No dysuria, frequency or hematuria  NEUROLOGICAL: No numbness or weakness  SKIN: No itching, burning, rashes, or lesions   All other review of systems is negative unless indicated above    PHYSICAL EXAM:  T(C): 36.5 (2022 00:59), Max: 36.8 (2022 21:31)  T(F): 97.7 (2022 00:59), Max: 98.3 (2022 21:31)  HR: 135 (2022 00:59) (84 - 135)  BP: 104/68 (2022 00:59) (104/68 - 125/73)  RR: 18 (2022 21:31) (18 - 18)  SpO2: 93% (2022 21:31) (93% - 93%)  Constitutional: NAD, awake and alert, obese, large neck  HEENT: PERR, EOMI, Normal Hearing, MMM  Neck: Soft and supple, No LAD, No JVD  Respiratory: no wheezing  Cardiovascular: S1 and S2, regular rate and rhythm, no Murmurs, gallops or rubs  Gastrointestinal: Bowel Sounds present, soft, nontender, nondistended, no guarding, no rebound  Extremities: No peripheral edema  Vascular: 2+ peripheral pulses  Neurological: A/O x 3, no focal deficits  Musculoskeletal: 5/5 strength b/l upper and lower extremities  Skin: No rashes      LABS:      CBC Full  -  ( 2022 07:48 )  WBC Count : 7.95 K/uL  RBC Count : 4.99 M/uL  Hemoglobin : 14.6 g/dL  Hematocrit : 43.5 %  Platelet Count - Automated : 309 K/uL  Mean Cell Volume : 87.2 fl  Mean Cell Hemoglobin : 29.3 pg  Mean Cell Hemoglobin Concentration : 33.6 gm/dL  Auto Neutrophil # : 6.51 K/uL  Auto Lymphocyte # : 0.72 K/uL  Auto Monocyte # : 0.66 K/uL  Auto Eosinophil # : 0.01 K/uL  Auto Basophil # : 0.01 K/uL  Auto Neutrophil % : 81.9 %  Auto Lymphocyte % : 9.1 %  Auto Monocyte % : 8.3 %  Auto Eosinophil % : 0.1 %  Auto Basophil % : 0.1 %    PT/INR - ( 2022 07:48 )   PT: 12.6 sec;   INR: 1.09 ratio         PTT - ( 2022 14:11 )  PTT:31.0 sec  Urinalysis Basic - ( 2022 00:05 )    Color: Yellow / Appearance: Clear / S.010 / pH: x  Gluc: x / Ketone: Trace  / Bili: Negative / Urobili: Negative   Blood: x / Protein: Negative / Nitrite: Negative   Leuk Esterase: Negative / RBC: x / WBC x   Sq Epi: x / Non Sq Epi: x / Bacteria: x      06-    134<L>  |  98  |  31<H>  ----------------------------<  197<H>  3.3<L>   |  27  |  1.25    Ca    9.1      2022 07:48  Mg     2.3     06-    TPro  6.7  /  Alb  3.3  /  TBili  0.5  /  DBili  x   /  AST  13<L>  /  ALT  33  /  AlkPhos  54  06-    61 year old male patient with acute respiratory failure         # Acute hypoxic respiratory failure / AECOPD / SVT / MAT s/p adenisone  - in anticipation of d/c planning, and improvement in patient's clinical state patient - change steroids ot PO  - karen / laba / montelukast  - get morning echo   - cardio: no ischemic w/u planned at this time /  raymond recommend outpatient monitoring to see if there are any silent arrhythmias  - awaiting doppler  - dimer negative  - incentive spirometer  - strongly recommended outpatient sleep study    # Chest pain/ Arrythmia  - now with chest pain free  - patient with exertional chest pain and diaphoresis  - had tachycardic arrythmia in the field and was given Adenosine x 2  - trend troponin: neg x 3  -get morning echo: pending  -serial EKG    # Hypokalemia  -replete    # pseudohyponatremia  # DM2  -give ISS  -on lantus    # Constipation  -give miralax    # Debility/ weakness  - get PT evaluation    # DVT ppx  -heparin sq

## 2022-06-24 ENCOUNTER — TRANSCRIPTION ENCOUNTER (OUTPATIENT)
Age: 62
End: 2022-06-24

## 2022-06-24 VITALS
OXYGEN SATURATION: 95 % | SYSTOLIC BLOOD PRESSURE: 157 MMHG | TEMPERATURE: 99 F | DIASTOLIC BLOOD PRESSURE: 88 MMHG | HEART RATE: 69 BPM | RESPIRATION RATE: 18 BRPM

## 2022-06-24 PROBLEM — J45.909 UNSPECIFIED ASTHMA, UNCOMPLICATED: Chronic | Status: ACTIVE | Noted: 2022-06-20

## 2022-06-24 PROBLEM — Z87.09 PERSONAL HISTORY OF OTHER DISEASES OF THE RESPIRATORY SYSTEM: Chronic | Status: ACTIVE | Noted: 2022-06-20

## 2022-06-24 PROBLEM — E11.9 TYPE 2 DIABETES MELLITUS WITHOUT COMPLICATIONS: Chronic | Status: ACTIVE | Noted: 2022-06-20

## 2022-06-24 PROCEDURE — 99233 SBSQ HOSP IP/OBS HIGH 50: CPT

## 2022-06-24 PROCEDURE — 99239 HOSP IP/OBS DSCHRG MGMT >30: CPT

## 2022-06-24 RX ORDER — EPINEPHRINE 0.3 MG/.3ML
3 INJECTION INTRAMUSCULAR; SUBCUTANEOUS
Qty: 1 | Refills: 0
Start: 2022-06-24 | End: 2022-06-24

## 2022-06-24 RX ORDER — EPINEPHRINE 0.3 MG/.3ML
3 INJECTION INTRAMUSCULAR; SUBCUTANEOUS
Qty: 0 | Refills: 0 | DISCHARGE

## 2022-06-24 RX ORDER — EMPAGLIFLOZIN 10 MG/1
1 TABLET, FILM COATED ORAL
Qty: 30 | Refills: 0
Start: 2022-06-24 | End: 2022-07-23

## 2022-06-24 RX ORDER — BUDESONIDE AND FORMOTEROL FUMARATE DIHYDRATE 160; 4.5 UG/1; UG/1
2 AEROSOL RESPIRATORY (INHALATION)
Qty: 0 | Refills: 0 | DISCHARGE

## 2022-06-24 RX ORDER — GLIMEPIRIDE 1 MG
1 TABLET ORAL
Qty: 0 | Refills: 0 | DISCHARGE

## 2022-06-24 RX ORDER — AZITHROMYCIN 500 MG/1
1 TABLET, FILM COATED ORAL
Qty: 5 | Refills: 0
Start: 2022-06-24 | End: 2022-06-28

## 2022-06-24 RX ORDER — GLIMEPIRIDE 1 MG
1 TABLET ORAL
Qty: 60 | Refills: 0
Start: 2022-06-24 | End: 2022-07-23

## 2022-06-24 RX ORDER — EMPAGLIFLOZIN 10 MG/1
1 TABLET, FILM COATED ORAL
Qty: 0 | Refills: 0 | DISCHARGE

## 2022-06-24 RX ORDER — BUDESONIDE AND FORMOTEROL FUMARATE DIHYDRATE 160; 4.5 UG/1; UG/1
2 AEROSOL RESPIRATORY (INHALATION)
Qty: 4 | Refills: 0
Start: 2022-06-24 | End: 2022-06-24

## 2022-06-24 RX ADMIN — OXYCODONE AND ACETAMINOPHEN 2 TABLET(S): 5; 325 TABLET ORAL at 09:31

## 2022-06-24 RX ADMIN — LOSARTAN POTASSIUM 100 MILLIGRAM(S): 100 TABLET, FILM COATED ORAL at 09:16

## 2022-06-24 RX ADMIN — Medication 25 MILLIGRAM(S): at 09:19

## 2022-06-24 RX ADMIN — BUDESONIDE AND FORMOTEROL FUMARATE DIHYDRATE 2 PUFF(S): 160; 4.5 AEROSOL RESPIRATORY (INHALATION) at 08:34

## 2022-06-24 RX ADMIN — Medication 4: at 09:15

## 2022-06-24 RX ADMIN — POLYETHYLENE GLYCOL 3350 17 GRAM(S): 17 POWDER, FOR SOLUTION ORAL at 09:17

## 2022-06-24 RX ADMIN — GABAPENTIN 800 MILLIGRAM(S): 400 CAPSULE ORAL at 05:54

## 2022-06-24 RX ADMIN — SPIRONOLACTONE 25 MILLIGRAM(S): 25 TABLET, FILM COATED ORAL at 09:16

## 2022-06-24 RX ADMIN — Medication 40 MILLIGRAM(S): at 09:16

## 2022-06-24 RX ADMIN — AZITHROMYCIN 500 MILLIGRAM(S): 500 TABLET, FILM COATED ORAL at 09:16

## 2022-06-24 RX ADMIN — HEPARIN SODIUM 5000 UNIT(S): 5000 INJECTION INTRAVENOUS; SUBCUTANEOUS at 09:17

## 2022-06-24 RX ADMIN — Medication 10: at 13:01

## 2022-06-24 RX ADMIN — Medication 650 MILLIGRAM(S): at 09:18

## 2022-06-24 NOTE — DISCHARGE NOTE NURSING/CASE MANAGEMENT/SOCIAL WORK - NSDCFUADDAPPT_GEN_ALL_CORE_FT
Follow-up appointment with Dr. Cruz on Tuesday, July 5, 2022 at 9:00 am.  Follow-up appointment with Dr. Delgado on Friday, July 1, 2022 at 11:00 am. Follow-up appointment with Dr. Cruz on Monday, June 27, 2022 at 10:15 am.  Follow-up appointment with Dr. Delgado on Friday, July 1, 2022 at 11:00 am.

## 2022-06-24 NOTE — PROGRESS NOTE ADULT - ASSESSMENT
61 year old man with the above PMH admitted with progressive shortness of breath, associated chest discomfort and weight gain.  There is no objective evidence for ACS.  His BNP is only mildly elevated.  He apparently had an SVT which was apparently broken by adenosine.  Rhythm currently stable.  All of his symptoms may be pulmonary in etiology as well as the SVT which could have been induced secondary to a pulmonary issue.  His Ekg is abnormal but will follow up with the TTE result to see if he did have a previous MI.    6/22:  TTE reveals normal LV systolic function thus ruling out any previous MI.  It also supports the idea that his symptoms/tachykardia are most likely pulmonary in etiology.  Cardiac status stable.  Continue present management.  Consider PO diltiazem if rapid rates recur.     6/23/22:  Patient with no further episodes of tachykardia.  Continue pulmonary management.  Continue to monitor for arrhythmia.  As stated if there is a recurrent problem would start PO diltiazem    6/24/22:  Patient remains stable with no further arrhythmias.  Continue present management.  Patient will require outpatient MCOT management.

## 2022-06-24 NOTE — DISCHARGE NOTE NURSING/CASE MANAGEMENT/SOCIAL WORK - NSDCPEFALRISK_GEN_ALL_CORE
For information on Fall & Injury Prevention, visit: [email or web address] OR  [email or web address] OR  https://www.cdc.gov/steadi/patient.html

## 2022-06-24 NOTE — PROGRESS NOTE ADULT - SUBJECTIVE AND OBJECTIVE BOX
Subjective:    Awake, alert. Much improved. Still w/ sl HUANG.    MEDICATIONS  (STANDING):  atorvastatin 40 milliGRAM(s) Oral at bedtime  azithromycin   Tablet 500 milliGRAM(s) Oral daily  budesonide 160 MICROgram(s)/formoterol 4.5 MICROgram(s) Inhaler 2 Puff(s) Inhalation two times a day  dextrose 5%. 1000 milliLiter(s) (50 mL/Hr) IV Continuous <Continuous>  dextrose 5%. 1000 milliLiter(s) (100 mL/Hr) IV Continuous <Continuous>  dextrose 50% Injectable 25 Gram(s) IV Push once  dextrose 50% Injectable 12.5 Gram(s) IV Push once  dextrose 50% Injectable 25 Gram(s) IV Push once  gabapentin 800 milliGRAM(s) Oral three times a day  glucagon  Injectable 1 milliGRAM(s) IntraMuscular once  heparin   Injectable 5000 Unit(s) SubCutaneous every 12 hours  hydrochlorothiazide 25 milliGRAM(s) Oral daily  influenza   Vaccine 0.5 milliLiter(s) IntraMuscular once  insulin glargine Injectable (LANTUS) 50 Unit(s) SubCutaneous at bedtime  insulin lispro (ADMELOG) corrective regimen sliding scale   SubCutaneous three times a day before meals  insulin lispro (ADMELOG) corrective regimen sliding scale   SubCutaneous at bedtime  losartan 100 milliGRAM(s) Oral daily  polyethylene glycol 3350 17 Gram(s) Oral daily  predniSONE   Tablet 40 milliGRAM(s) Oral daily  senna 2 Tablet(s) Oral at bedtime  spironolactone 25 milliGRAM(s) Oral daily    MEDICATIONS  (PRN):  acetaminophen     Tablet .. 650 milliGRAM(s) Oral every 6 hours PRN Temp greater or equal to 38C (100.4F), Mild Pain (1 - 3)  ALBUTerol    90 MICROgram(s) HFA Inhaler 2 Puff(s) Inhalation every 6 hours PRN Shortness of Breath and/or Wheezing  aluminum hydroxide/magnesium hydroxide/simethicone Suspension 30 milliLiter(s) Oral every 4 hours PRN Dyspepsia  dextrose Oral Gel 15 Gram(s) Oral once PRN Blood Glucose LESS THAN 70 milliGRAM(s)/deciliter  melatonin 3 milliGRAM(s) Oral at bedtime PRN Insomnia  ondansetron Injectable 4 milliGRAM(s) IV Push every 8 hours PRN Nausea and/or Vomiting  oxycodone    5 mG/acetaminophen 325 mG 2 Tablet(s) Oral every 6 hours PRN Severe Pain (7 - 10)      Allergies    No Known Allergies    Intolerances        Vital Signs Last 24 Hrs  T(C): 36.7 (23 Jun 2022 23:13), Max: 36.7 (23 Jun 2022 15:54)  T(F): 98 (23 Jun 2022 23:13), Max: 98 (23 Jun 2022 15:54)  HR: 74 (23 Jun 2022 23:13) (66 - 83)  BP: 130/77 (23 Jun 2022 23:13) (120/67 - 144/86)  BP(mean): --  RR: 18 (23 Jun 2022 23:13) (18 - 18)  SpO2: 94% (23 Jun 2022 23:13) (93% - 94%)    PHYSICAL EXAMINATION:    NECK:  Supple. No lymphadenopathy. Jugular venous pressure not elevated.   HEART:   The cardiac impulse has a normal quality. Reg., Nl S1 and S2.    CHEST:  Chest is clear to auscultation, diffusely diminished BS. +Exp wheezes w/ forced maneuvers. Normal respiratory effort.  ABDOMEN:  Soft and nontender.   EXTREMITIES:  There is no edema.       LABS:                        15.2   9.52  )-----------( 311      ( 23 Jun 2022 08:45 )             43.8     06-23    134<L>  |  97  |  27<H>  ----------------------------<  223<H>  3.9   |  31  |  0.87    Ca    9.5      23 Jun 2022 08:45  Mg     2.6     06-22            RADIOLOGY & ADDITIONAL TESTS:    Assessment and Recommendation:   · Assessment	  - NC O2 as needed,-  - Symbicort 160 strength, Albuterol inhaler prn,-  - Prednisone 40MG QD-taper over 10-14 days as outpatient    - Cardiology following appreciated. Echocardiogram-noted.   - Ambulate as shady  - Monitor daily weights and O2 sats  - Case d/w'ed primary team physician.   - Cont. Spiriva  - Zithromax for 7 days total  - Continue diuresis  - Consider Tx for tachyarrhythmia as per cardiology  - DVT prophylaxis, on heparin.   - D/C home as per hospitalist team      Problem/Recommendation - 1:  ·  COPD with acute exacerbation.   Problem/Recommendation - 2:  ·  Chest pain.   Problem/Recommendation - 3:  ·  R/O COPD exacerbation.   Problem/Recommendation - 4:  ·  R/O Tachycardia.   Problem/Recommendation - 5:  ·  R/O Atrial arrhythmia.

## 2022-06-24 NOTE — PROGRESS NOTE ADULT - REASON FOR ADMISSION
Acute hypoxic respiratory failure  Chest pain

## 2022-06-24 NOTE — DISCHARGE NOTE NURSING/CASE MANAGEMENT/SOCIAL WORK - PATIENT PORTAL LINK FT
You can access the FollowMyHealth Patient Portal offered by [facility] by registering at the following website: [email or web address]. By joining [employer]’s FollowMyHealth portal, you will also be able to view your health information using other applications (apps) compatible with our system.

## 2022-06-24 NOTE — DISCHARGE NOTE PROVIDER - NSDCCPCAREPLAN_GEN_ALL_CORE_FT
PRINCIPAL DISCHARGE DIAGNOSIS  Diagnosis: Shortness of breath  Assessment and Plan of Treatment: - this is due to COPD  - continue with inhalers  - prednisone taper recommended  - zithromax total of 7 days  - strongly recommended weight loss  - strongly recommended outpatient sleep study  - close follow up with Dr. Delgado      SECONDARY DISCHARGE DIAGNOSES  Diagnosis: Palpitations  Assessment and Plan of Treatment: - Patient remains stable with no further arrhythmias  -  Continue present management.  Patient will require outpatient MCOT management.  - close follow up with Dr. Cruz

## 2022-06-24 NOTE — PROGRESS NOTE ADULT - SUBJECTIVE AND OBJECTIVE BOX
CHIEF COMPLAINT: Patient is a 61y old  Male who presents with a chief complaint of Acute hypoxic respiratory failure  Chest pain (20 Jun 2022 17:09)      HPI: HPI:  61 year old male patient with pertinent past medial history noted for  COPD and DM presented to the ED complaining of a one week history of progressively worsening shortness of breath/ exertional dyspnea associated with chest pain, weakness and diaphoresis. Of note, patient drove back from [place] within the past 24 hours. States he had been having a productive cough and profound substernal chest pain with activity. Also endorsing a 20 pound weight gain and peripheral edema. Patient had a pharmacological stress test 10 years ago, which reports was negative. Of note, patient seen and evaluated in the office of Dr Delgado prior to ED presentation and was found to be tachycardic. Collateral information gathered from EMS revealed he was given Adenosine 6mg followed by 12 mg prior to arrival.         (20 Jun 2022 17:09)      PMHx: PAST MEDICAL & SURGICAL HISTORY:  History of COPD      DM (diabetes mellitus)      Asthma      S/P partial lobectomy of lung            Soc Hx:     FAMILY HISTORY:  FH: diabetes mellitus        Allergies: Allergies    No Known Allergies    Intolerances    6/22/22:  Patient had rapid HRs overnight for which he received diltiazem        REVIEW OF SYSTEMS:    Patient is feeling better      ICU Vital Signs Last 24 Hrs  T(C): 36.7 (23 Jun 2022 23:13), Max: 36.7 (23 Jun 2022 15:54)  T(F): 98 (23 Jun 2022 23:13), Max: 98 (23 Jun 2022 15:54)  HR: 74 (23 Jun 2022 23:13) (66 - 83)  BP: 130/77 (23 Jun 2022 23:13) (120/67 - 144/86)  BP(mean): --  ABP: --  ABP(mean): --  RR: 18 (23 Jun 2022 23:13) (18 - 18)  SpO2: 94% (23 Jun 2022 23:13) (93% - 94%)          I&O's Summary      CAPILLARY BLOOD GLUCOSE      POCT Blood Glucose.: 292 mg/dL (20 Jun 2022 22:28)  POCT Blood Glucose.: 108 mg/dL (20 Jun 2022 16:48)      PHYSICAL EXAM:   Patient in NAD  Neck: No JVD; Carotids:  2+ without bruits  Respiratory:  Clear to A&P  Cardiovascular: S1 and S2, regular rate and rhythm, no Murmurs, gallops or rubs        MEDICATIONS  (STANDING):  atorvastatin 40 milliGRAM(s) Oral at bedtime  azithromycin   Tablet 500 milliGRAM(s) Oral daily  budesonide 160 MICROgram(s)/formoterol 4.5 MICROgram(s) Inhaler 2 Puff(s) Inhalation two times a day  dextrose 5%. 1000 milliLiter(s) (50 mL/Hr) IV Continuous <Continuous>  dextrose 5%. 1000 milliLiter(s) (100 mL/Hr) IV Continuous <Continuous>  dextrose 50% Injectable 25 Gram(s) IV Push once  dextrose 50% Injectable 12.5 Gram(s) IV Push once  dextrose 50% Injectable 25 Gram(s) IV Push once  gabapentin 800 milliGRAM(s) Oral three times a day  glucagon  Injectable 1 milliGRAM(s) IntraMuscular once  heparin   Injectable 5000 Unit(s) SubCutaneous every 12 hours  hydrochlorothiazide 25 milliGRAM(s) Oral daily  influenza   Vaccine 0.5 milliLiter(s) IntraMuscular once  insulin glargine Injectable (LANTUS) 50 Unit(s) SubCutaneous at bedtime  insulin lispro (ADMELOG) corrective regimen sliding scale   SubCutaneous three times a day before meals  insulin lispro (ADMELOG) corrective regimen sliding scale   SubCutaneous at bedtime  losartan 100 milliGRAM(s) Oral daily  polyethylene glycol 3350 17 Gram(s) Oral daily  predniSONE   Tablet 40 milliGRAM(s) Oral two times a day  senna 2 Tablet(s) Oral at bedtime  spironolactone 25 milliGRAM(s) Oral daily            LABS: All Labs Reviewed:  Blood Culture:   BNP Serum Pro-Brain Natriuretic Peptide: 222 pg/mL (06-20 @ 14:11)                        14.6   7.95  )-----------( 309      ( 21 Jun 2022 07:48 )             43.5     CBC             WBC Count: 8.09 K/uL (06-20 @ 14:11)              Hemoglobin: 15.8 g/dL (06-20 @ 14:11)              Hematocrit: 46.4 % (06-20 @ 14:11)              Mean Cell Volume: 87.2 fl (06-20 @ 14:11)              Platelet Count - Automated: 307 K/uL (06-20 @ 14:11)                            Cardiac markers   Troponin I, High Sensitivity (06.20.22 @ 14:11) Troponin I, High Sensitivity Result: 23.52  Troponin I, High Sensitivity (06.20.22 @ 19:41) Troponin I, High Sensitivity Result: 30.93           06-22    131<L>  |  96  |  31<H>  ----------------------------<  334<H>  4.3   |  27  |  1.12    Ca    9.1      22 Jun 2022 09:05  Mg     2.6     06-22    TPro  6.7  /  Alb  3.3  /  TBili  0.5  /  DBili  x   /  AST  13<L>  /  ALT  33  /  AlkPhos  54  06-21                           Chems        Sodium, Serum: 140 mmol/L (06-20 @ 14:11)          Potassium, Serum: 3.4 mmol/L (06-20 @ 14:11)          Blood Urea Nitrogen, Serum: 17 mg/dL (06-20 @ 14:11)          Creatinine 1.12          Magnesium, Serum: 2.3 mg/dL (06-20 @ 14:11)          Protein Total, Serum: 6.3 gm/dL (06-20 @ 14:11)                  Calcium, Total Serum: 9.3 mg/dL (06-20 @ 14:11)                  Bilirubin Total, Serum: 0.6 mg/dL (06-20 @ 14:11)          Alanine Aminotransferase (ALT/SGPT): 35 U/L (06-20 @ 14:11)          Aspartate Aminotransferase (AST/SGOT): 15 U/L (06-20 @ 14:11)                 INR: 1.07 ratio (06-20 @ 14:11)             RADIOLOGY: < from: Xray Chest 1 View-PORTABLE IMMEDIATE (06.20.22 @ 13:36) >  IMPRESSION: Slight residual bibasilar findings.    < end of copied text >      EKG:  < from: 12 Lead ECG (06.20.22 @ 13:26) >  Sinus tachycardia  Left axis deviation  Low voltage QRS  Inferior infarct , age undetermined  Cannot rule out Anteroseptal infarct , age undetermined  Abnormal ECG    < end of copied text >    Telemetry:  sinus:  Sinus 60-80    ECHO: < from: TTE Echo Complete w/o Contrast w/ Doppler (06.21.22 @ 11:15) >   Summary     The left ventricle is normal in size, wall motion and contractility.   Mild concentric left ventricular hypertrophy is present.   Estimated left ventricular ejection fraction is 60-65 %.   Trace tricuspid valve regurgitation. Normal PA systolic pressure.   Lipomatous atrial septal wall noted - a benign variant of minimal   clinical   significance.     Signature     ----------------------------------------------------------------   Electronically signed by Armando Purdy MD(Interpreting   physician) on 06/21/2022 06:02 PM    < end of copied text >

## 2022-06-24 NOTE — DISCHARGE NOTE PROVIDER - CARE PROVIDER_API CALL
Du Delgado)  Internal Medicine; Pulmonary Disease  [address] [facility] [facility] [address]  [place] [address]  Phone: [phone]  Fax: [phone]  Follow Up Time:     Alexander Cruz  CARDIOVASCULAR DISEASE  [address] [facility], [facility] [address]  [place] [address]  Phone: [phone]  Fax: [phone]  Follow Up Time:

## 2022-06-24 NOTE — DISCHARGE NOTE PROVIDER - NSDCMRMEDTOKEN_GEN_ALL_CORE_FT
atorvastatin 40 mg oral tablet: 1 tab(s) orally once a day  azithromycin 500 mg oral tablet: 1 tab(s) orally once a day  chlorthalidone 25 mg oral tablet: 1 tab(s) orally once a day  empagliflozin 25 mg oral tablet: 1 tab(s) orally once a day (in the morning)  gabapentin 800 mg oral tablet: 1 tab(s) orally 3 times a day  glimepiride 2 mg oral tablet: 1 tab(s) orally 2 times a day  losartan 100 mg oral tablet: 1 tab(s) orally once a day  metFORMIN 1000 mg oral tablet: 1 tab(s) orally 2 times a day  predniSONE 20 mg oral tablet: 2 tab(s) orally once a day    40 mg po qdaily 6/25 - 6/28  30 mg po qdaily 6/29 - 7/1  20 mg po qdaily 7/2 - 7/5  10 mg po qdaily 7/6 - 7/8  spironolactone 25 mg oral tablet: 1 tab(s) orally once a day  Tresiba FlexTouch 200 units/mL subcutaneous solution: 50 unit(s) subcutaneous once a day (at bedtime)   albuterol 1.25 mg/3 mL (0.042%) inhalation solution: 3 milliliter(s) inhaled 3 times a day, As Needed  atorvastatin 40 mg oral tablet: 1 tab(s) orally once a day  azithromycin 500 mg oral tablet: 1 tab(s) orally once a day  chlorthalidone 25 mg oral tablet: 1 tab(s) orally once a day  empagliflozin 25 mg oral tablet: 1 tab(s) orally once a day (in the morning)  gabapentin 800 mg oral tablet: 1 tab(s) orally 3 times a day  glimepiride 2 mg oral tablet: 1 tab(s) orally 2 times a day  losartan 100 mg oral tablet: 1 tab(s) orally once a day  metFORMIN 1000 mg oral tablet: 1 tab(s) orally 2 times a day  predniSONE 20 mg oral tablet: 2 tab(s) orally once a day    40 mg po qdaily 6/25 - 6/28  30 mg po qdaily 6/29 - 7/1  20 mg po qdaily 7/2 - 7/5  10 mg po qdaily 7/6 - 7/8  spironolactone 25 mg oral tablet: 1 tab(s) orally once a day  Symbicort 160 mcg-4.5 mcg/inh inhalation aerosol: 2 puff(s) inhaled 2 times a day  Tresiba FlexTouch 200 units/mL subcutaneous solution: 50 unit(s) subcutaneous once a day (at bedtime)

## 2022-06-24 NOTE — PROGRESS NOTE ADULT - PROVIDER SPECIALTY LIST ADULT
Cardiology
Pulmonology
Cardiology
Hospitalist
Hospitalist
Pulmonology
Pulmonology
Cardiology
Hospitalist

## 2022-06-24 NOTE — DISCHARGE NOTE PROVIDER - CARE PROVIDERS DIRECT ADDRESSES
[email or web address]

## 2022-06-24 NOTE — DISCHARGE NOTE PROVIDER - HOSPITAL COURSE
61 year old male patient with pertinent past medial history noted for  COPD and DM non compliant with doctor visits,  presented to the ED complaining of a one week history of progressively worsening shortness of breath/ exertional dyspnea associated with chest pain, weakness and diaphoresis. Trial of steroids and bax was tried by his pcp in [place] and did not provide relief. States he is using his neb more than usual.  Of note, patient drove back from [place] within the past 24 hours. States he had been having a productive cough and profound substernal chest pain with activity. Also endorsing a 20 pound weight gain and peripheral edema. Patient had a pharmacological stress test 10 years ago, which reports was negative. Of note, patient seen and evaluated in the office of Dr Delgado prior to ED presentation and was found to be tachycardic. Collateral information gathered from EMS revealed he was given Adenosine 6mg followed by 12 mg prior to arrival.    Medical progress: Doing great. No new complaints. Denies any HA, CP, SOB. D/C planning.   Complaints: no new complaints  State of mind: noraml  Care discussed with Dr. Delgado /  Care discussed with Dr. Cruz. Patient is anticipated to be in the hospital till then end of the week    Physical Exam:   GENERAL APPEARANCE:  NAD, hemodynamically stable  ICU Vital Signs Last 24 Hrs  T(C): 36.7 (23 Jun 2022 23:13), Max: 36.7 (23 Jun 2022 15:54)  T(F): 98 (23 Jun 2022 23:13), Max: 98 (23 Jun 2022 15:54)  HR: 74 (23 Jun 2022 23:13) (74 - 83)  BP: 130/77 (23 Jun 2022 23:13) (120/67 - 130/77)  BP(mean): --  ABP: --  ABP(mean): --  RR: 18 (23 Jun 2022 23:13) (18 - 18)  SpO2: 94% (23 Jun 2022 23:13) (93% - 94%)    HEENT:  Head is normocephalic    Skin:  Warm and dry without any rash   NECK:  Supple without lymphadenopathy.   HEART:  Regular rate and rhythm. normal S1 and S2, No M/R/G  LUNGS:  Good ins/exp effort, no W/R/R/C  ABDOMEN:  Soft, nontender, nondistended with good bowel sounds heard  EXTREMITIES:  Without cyanosis, clubbing or edema.   NEUROLOGICAL:  Gross nonfocal

## 2022-06-28 ENCOUNTER — APPOINTMENT (OUTPATIENT)
Dept: INTERNAL MEDICINE | Facility: CLINIC | Age: 62
End: 2022-06-28
Payer: OTHER MISCELLANEOUS

## 2022-06-28 ENCOUNTER — NON-APPOINTMENT (OUTPATIENT)
Age: 62
End: 2022-06-28

## 2022-06-28 VITALS
BODY MASS INDEX: 45.1 KG/M2 | TEMPERATURE: 98.3 F | DIASTOLIC BLOOD PRESSURE: 80 MMHG | HEART RATE: 94 BPM | WEIGHT: 315 LBS | SYSTOLIC BLOOD PRESSURE: 150 MMHG | HEIGHT: 70 IN | RESPIRATION RATE: 20 BRPM | OXYGEN SATURATION: 94 %

## 2022-06-28 DIAGNOSIS — R05.8 OTHER SPECIFIED COUGH: ICD-10-CM

## 2022-06-28 PROCEDURE — 99215 OFFICE O/P EST HI 40 MIN: CPT

## 2022-06-28 PROCEDURE — 99072 ADDL SUPL MATRL&STAF TM PHE: CPT

## 2022-06-28 RX ORDER — INSULIN DEGLUDEC INJECTION 200 U/ML
200 INJECTION, SOLUTION SUBCUTANEOUS
Qty: 9 | Refills: 0 | Status: ACTIVE | COMMUNITY
Start: 2022-04-15

## 2022-06-28 RX ORDER — ALBUTEROL SULFATE 1.25 MG/3ML
1.25 SOLUTION RESPIRATORY (INHALATION)
Qty: 75 | Refills: 0 | Status: ACTIVE | COMMUNITY
Start: 2022-06-24

## 2022-06-28 RX ORDER — EMPAGLIFLOZIN 25 MG/1
25 TABLET, FILM COATED ORAL
Qty: 90 | Refills: 0 | Status: ACTIVE | COMMUNITY
Start: 2022-03-31

## 2022-06-28 RX ORDER — SPIRONOLACTONE 25 MG/1
25 TABLET ORAL
Qty: 90 | Refills: 0 | Status: ACTIVE | COMMUNITY
Start: 2022-03-31

## 2022-06-28 RX ORDER — CHLORTHALIDONE 25 MG/1
25 TABLET ORAL
Qty: 90 | Refills: 0 | Status: ACTIVE | COMMUNITY
Start: 2022-03-31

## 2022-06-28 RX ORDER — HYDROCODONE BITARTRATE AND ACETAMINOPHEN 10; 325 MG/1; MG/1
10-325 TABLET ORAL
Qty: 120 | Refills: 0 | Status: ACTIVE | COMMUNITY
Start: 2022-01-25

## 2022-06-28 RX ORDER — POLYETHYLENE GLYCOL-3350 AND ELECTROLYTES 236; 6.74; 5.86; 2.97; 22.74 G/274.31G; G/274.31G; G/274.31G; G/274.31G; G/274.31G
236 POWDER, FOR SOLUTION ORAL
Qty: 4000 | Refills: 0 | Status: ACTIVE | COMMUNITY
Start: 2022-06-14

## 2022-06-28 RX ORDER — LOSARTAN POTASSIUM 100 MG/1
100 TABLET, FILM COATED ORAL
Qty: 90 | Refills: 0 | Status: ACTIVE | COMMUNITY
Start: 2022-03-31

## 2022-06-28 RX ORDER — GABAPENTIN 800 MG/1
800 TABLET, COATED ORAL
Qty: 270 | Refills: 0 | Status: ACTIVE | COMMUNITY
Start: 2022-05-24

## 2022-06-28 RX ORDER — IBUPROFEN 800 MG/1
800 TABLET, FILM COATED ORAL
Qty: 270 | Refills: 0 | Status: ACTIVE | COMMUNITY
Start: 2022-02-24

## 2022-06-28 NOTE — PHYSICAL EXAM
[No Acute Distress] : no acute distress [Well Nourished] : well nourished [Well Developed] : well developed [Well-Appearing] : well-appearing [Normal Sclera/Conjunctiva] : normal sclera/conjunctiva [PERRL] : pupils equal round and reactive to light [EOMI] : extraocular movements intact [Normal Outer Ear/Nose] : the outer ears and nose were normal in appearance [Normal Oropharynx] : the oropharynx was normal [No JVD] : no jugular venous distention [No Lymphadenopathy] : no lymphadenopathy [Supple] : supple [Thyroid Normal, No Nodules] : the thyroid was normal and there were no nodules present [No Respiratory Distress] : no respiratory distress  [No Accessory Muscle Use] : no accessory muscle use [Normal Rate] : normal rate  [Regular Rhythm] : with a regular rhythm [Normal S1, S2] : normal S1 and S2 [No Murmur] : no murmur heard [No Carotid Bruits] : no carotid bruits [No Abdominal Bruit] : a ~M bruit was not heard ~T in the abdomen [No Varicosities] : no varicosities [Pedal Pulses Present] : the pedal pulses are present [No Edema] : there was no peripheral edema [No Palpable Aorta] : no palpable aorta [No Extremity Clubbing/Cyanosis] : no extremity clubbing/cyanosis [Soft] : abdomen soft [Non Tender] : non-tender [Non-distended] : non-distended [No Masses] : no abdominal mass palpated [No HSM] : no HSM [Normal Bowel Sounds] : normal bowel sounds [Normal Posterior Cervical Nodes] : no posterior cervical lymphadenopathy [Normal Anterior Cervical Nodes] : no anterior cervical lymphadenopathy [No CVA Tenderness] : no CVA  tenderness [No Spinal Tenderness] : no spinal tenderness [No Joint Swelling] : no joint swelling [Grossly Normal Strength/Tone] : grossly normal strength/tone [No Rash] : no rash [Coordination Grossly Intact] : coordination grossly intact [No Focal Deficits] : no focal deficits [Normal Gait] : normal gait [Deep Tendon Reflexes (DTR)] : deep tendon reflexes were 2+ and symmetric [Normal Affect] : the affect was normal [Normal Insight/Judgement] : insight and judgment were intact [de-identified] : Scattered rhonchi [de-identified] : morbidly obese

## 2022-06-28 NOTE — PLAN
[FreeTextEntry1] : Mr. Jones presents for followup evaluation. He was admitted to [facility] with increased shortness of breath secondary to exacerbation of COPD and fluid overload. He responded to bronchodilator treatment, IV steroids and intravenous Zithromax. Facial side cardiology evaluation was treated with IV Lasix for his leg swelling. Symptoms did improve. His meds here for a followup evaluation. Patient is already followed up with cardiology and has had a loop recorder placed in the left anterior chest. Patient will continue Symbicort 160/12.5 mcg 2 puffs b.i.d. and albuterol 2 puffs q.4 hours p.r.n.

## 2022-06-28 NOTE — HISTORY OF PRESENT ILLNESS
[FreeTextEntry1] : Hospital followup [de-identified] : Mr. Jones presents for followup evaluation. He was admitted to [facility] on 6/20/22 with increased shortness of breath. Patient has a history of COPD. Patient was in [place] and developed significant shortness of breath with associated chest pain. He went to the emergency room in [place], a, he had to wait 18 hours before being seen by a physician. He decided to drive back from [place] to [place]. He admitted to a 20 pound weight gain and increased leg swelling on evaluation by Dr. Delgado in the office. Patient was then sent to the hospital for admission and medical management. He was put on supplemental oxygen, IV steroids and antibiotics. Mr. Jones was also seen by cardiology, Dr. Cruz.\par Patient now presents for followup. He is feeling much better. His shortness of breath has improved. He continues on Symbicort 160/12.5 mcg 2 puffs b.i.d. He has albuterol metered-dose inhaler for rescue therapy. He is finishing a 7 day course of prednisone as well as a 7 day course of azithromycin 500 mg daily. He recently had a loop recorder placed. Patient is leaving for [place] tomorrow. He has a history of obstructive sleep apnea but does not wear his CPAP mask.

## 2022-07-01 ENCOUNTER — APPOINTMENT (OUTPATIENT)
Dept: INTERNAL MEDICINE | Facility: CLINIC | Age: 62
End: 2022-07-01

## 2022-07-01 DIAGNOSIS — Z79.84 LONG TERM (CURRENT) USE OF ORAL HYPOGLYCEMIC DRUGS: ICD-10-CM

## 2022-07-01 DIAGNOSIS — Z79.4 LONG TERM (CURRENT) USE OF INSULIN: ICD-10-CM

## 2022-07-01 DIAGNOSIS — E87.6 HYPOKALEMIA: ICD-10-CM

## 2022-07-01 DIAGNOSIS — R60.0 LOCALIZED EDEMA: ICD-10-CM

## 2022-07-01 DIAGNOSIS — Z79.51 LONG TERM (CURRENT) USE OF INHALED STEROIDS: ICD-10-CM

## 2022-07-01 DIAGNOSIS — Z20.822 CONTACT WITH AND (SUSPECTED) EXPOSURE TO COVID-19: ICD-10-CM

## 2022-07-01 DIAGNOSIS — E11.9 TYPE 2 DIABETES MELLITUS WITHOUT COMPLICATIONS: ICD-10-CM

## 2022-07-01 DIAGNOSIS — J44.1 CHRONIC OBSTRUCTIVE PULMONARY DISEASE WITH (ACUTE) EXACERBATION: ICD-10-CM

## 2022-07-01 DIAGNOSIS — K59.00 CONSTIPATION, UNSPECIFIED: ICD-10-CM

## 2022-07-01 DIAGNOSIS — G47.30 SLEEP APNEA, UNSPECIFIED: ICD-10-CM

## 2022-07-01 DIAGNOSIS — Z90.2 ACQUIRED ABSENCE OF LUNG [PART OF]: ICD-10-CM

## 2022-07-01 DIAGNOSIS — Z91.19 PATIENT'S NONCOMPLIANCE WITH OTHER MEDICAL TREATMENT AND REGIMEN: ICD-10-CM

## 2022-07-01 DIAGNOSIS — Z79.52 LONG TERM (CURRENT) USE OF SYSTEMIC STEROIDS: ICD-10-CM

## 2022-07-01 DIAGNOSIS — Z87.891 PERSONAL HISTORY OF NICOTINE DEPENDENCE: ICD-10-CM

## 2022-07-01 DIAGNOSIS — I47.1 SUPRAVENTRICULAR TACHYCARDIA: ICD-10-CM

## 2022-07-01 DIAGNOSIS — J96.01 ACUTE RESPIRATORY FAILURE WITH HYPOXIA: ICD-10-CM

## 2022-09-12 ENCOUNTER — INPATIENT (INPATIENT)
Facility: HOSPITAL | Age: 62
LOS: 4 days | Discharge: HOME | DRG: 140 | End: 2022-09-17
Attending: STUDENT IN AN ORGANIZED HEALTH CARE EDUCATION/TRAINING PROGRAM | Admitting: STUDENT IN AN ORGANIZED HEALTH CARE EDUCATION/TRAINING PROGRAM
Payer: COMMERCIAL

## 2022-09-12 ENCOUNTER — APPOINTMENT (OUTPATIENT)
Dept: INTERNAL MEDICINE | Facility: CLINIC | Age: 62
End: 2022-09-12

## 2022-09-12 VITALS — HEIGHT: 72 IN | WEIGHT: 315 LBS

## 2022-09-12 VITALS
HEART RATE: 83 BPM | DIASTOLIC BLOOD PRESSURE: 86 MMHG | OXYGEN SATURATION: 95 % | BODY MASS INDEX: 45.1 KG/M2 | HEIGHT: 70 IN | SYSTOLIC BLOOD PRESSURE: 134 MMHG | WEIGHT: 315 LBS | TEMPERATURE: 98.5 F | RESPIRATION RATE: 20 BRPM

## 2022-09-12 DIAGNOSIS — Z90.2 ACQUIRED ABSENCE OF LUNG [PART OF]: Chronic | ICD-10-CM

## 2022-09-12 DIAGNOSIS — J45.901 UNSPECIFIED ASTHMA WITH (ACUTE) EXACERBATION: ICD-10-CM

## 2022-09-12 DIAGNOSIS — Z57.9 SEVERE PERSISTENT ASTHMA, UNCOMPLICATED: ICD-10-CM

## 2022-09-12 DIAGNOSIS — J45.50 SEVERE PERSISTENT ASTHMA, UNCOMPLICATED: ICD-10-CM

## 2022-09-12 LAB
BASOPHILS # BLD AUTO: 0.08 K/UL — SIGNIFICANT CHANGE UP (ref 0–0.2)
BASOPHILS NFR BLD AUTO: 1.5 % — SIGNIFICANT CHANGE UP (ref 0–2)
EOSINOPHIL # BLD AUTO: 0.3 K/UL — SIGNIFICANT CHANGE UP (ref 0–0.5)
EOSINOPHIL NFR BLD AUTO: 5.5 % — SIGNIFICANT CHANGE UP (ref 0–6)
HCT VFR BLD CALC: 40.7 % — SIGNIFICANT CHANGE UP (ref 39–50)
HGB BLD-MCNC: 13.5 G/DL — SIGNIFICANT CHANGE UP (ref 13–17)
IMM GRANULOCYTES NFR BLD AUTO: 0 % — SIGNIFICANT CHANGE UP (ref 0–1.5)
LYMPHOCYTES # BLD AUTO: 1.67 K/UL — SIGNIFICANT CHANGE UP (ref 1–3.3)
LYMPHOCYTES # BLD AUTO: 30.5 % — SIGNIFICANT CHANGE UP (ref 13–44)
MCHC RBC-ENTMCNC: 30.1 PG — SIGNIFICANT CHANGE UP (ref 27–34)
MCHC RBC-ENTMCNC: 33.2 GM/DL — SIGNIFICANT CHANGE UP (ref 32–36)
MCV RBC AUTO: 90.6 FL — SIGNIFICANT CHANGE UP (ref 80–100)
MONOCYTES # BLD AUTO: 0.81 K/UL — SIGNIFICANT CHANGE UP (ref 0–0.9)
MONOCYTES NFR BLD AUTO: 14.8 % — HIGH (ref 2–14)
NEUTROPHILS # BLD AUTO: 2.61 K/UL — SIGNIFICANT CHANGE UP (ref 1.8–7.4)
NEUTROPHILS NFR BLD AUTO: 47.7 % — SIGNIFICANT CHANGE UP (ref 43–77)
PLATELET # BLD AUTO: 266 K/UL — SIGNIFICANT CHANGE UP (ref 150–400)
RBC # BLD: 4.49 M/UL — SIGNIFICANT CHANGE UP (ref 4.2–5.8)
RBC # FLD: 13.2 % — SIGNIFICANT CHANGE UP (ref 10.3–14.5)
WBC # BLD: 5.47 K/UL — SIGNIFICANT CHANGE UP (ref 3.8–10.5)
WBC # FLD AUTO: 5.47 K/UL — SIGNIFICANT CHANGE UP (ref 3.8–10.5)

## 2022-09-12 PROCEDURE — 99072 ADDL SUPL MATRL&STAF TM PHE: CPT

## 2022-09-12 PROCEDURE — 99214 OFFICE O/P EST MOD 30 MIN: CPT

## 2022-09-12 PROCEDURE — 99285 EMERGENCY DEPT VISIT HI MDM: CPT

## 2022-09-12 PROCEDURE — 93010 ELECTROCARDIOGRAM REPORT: CPT

## 2022-09-12 PROCEDURE — 71045 X-RAY EXAM CHEST 1 VIEW: CPT | Mod: 26

## 2022-09-12 RX ORDER — ALBUTEROL SULFATE 90 UG/1
108 (90 BASE) AEROSOL, METERED RESPIRATORY (INHALATION)
Qty: 2 | Refills: 5 | Status: DISCONTINUED | OUTPATIENT
End: 2022-09-12

## 2022-09-12 RX ORDER — IPRATROPIUM BROMIDE AND ALBUTEROL SULFATE 2.5; .5 MG/3ML; MG/3ML
0.5-2.5 (3) SOLUTION RESPIRATORY (INHALATION)
Qty: 1 | Refills: 3 | Status: ACTIVE | COMMUNITY
Start: 1900-01-01 | End: 1900-01-01

## 2022-09-12 RX ORDER — PREDNISONE 10 MG/1
10 TABLET ORAL
Qty: 30 | Refills: 0 | Status: DISCONTINUED | COMMUNITY
Start: 2022-04-15 | End: 2022-09-12

## 2022-09-12 RX ORDER — AZITHROMYCIN 500 MG/1
500 TABLET, FILM COATED ORAL
Qty: 5 | Refills: 0 | Status: DISCONTINUED | COMMUNITY
Start: 2022-06-24 | End: 2022-09-12

## 2022-09-12 RX ORDER — DOXYCYCLINE HYCLATE 100 MG/1
100 CAPSULE ORAL
Qty: 14 | Refills: 0 | Status: DISCONTINUED | COMMUNITY
Start: 2022-04-01 | End: 2022-09-12

## 2022-09-12 RX ORDER — MAGNESIUM SULFATE 500 MG/ML
2 VIAL (ML) INJECTION ONCE
Refills: 0 | Status: COMPLETED | OUTPATIENT
Start: 2022-09-12 | End: 2022-09-12

## 2022-09-12 RX ORDER — CEFDINIR 300 MG/1
300 CAPSULE ORAL
Qty: 14 | Refills: 0 | Status: DISCONTINUED | COMMUNITY
Start: 2022-04-01 | End: 2022-09-12

## 2022-09-12 RX ORDER — LEVOFLOXACIN 750 MG/1
750 TABLET, FILM COATED ORAL
Qty: 7 | Refills: 0 | Status: DISCONTINUED | COMMUNITY
Start: 2022-06-03 | End: 2022-09-12

## 2022-09-12 RX ORDER — IPRATROPIUM/ALBUTEROL SULFATE 18-103MCG
3 AEROSOL WITH ADAPTER (GRAM) INHALATION ONCE
Refills: 0 | Status: COMPLETED | OUTPATIENT
Start: 2022-09-12 | End: 2022-09-12

## 2022-09-12 RX ORDER — ASPIRIN/CALCIUM CARB/MAGNESIUM 324 MG
162 TABLET ORAL ONCE
Refills: 0 | Status: COMPLETED | OUTPATIENT
Start: 2022-09-12 | End: 2022-09-12

## 2022-09-12 SDOH — HEALTH STABILITY - PHYSICAL HEALTH: OCCUPATIONAL EXPOSURE TO UNSPECIFIED RISK FACTOR: Z57.9

## 2022-09-12 NOTE — ED STATDOCS - PROGRESS NOTE DETAILS
Tanja Mariano for attending Dr. Espinosa:  63 y/o male with PMHx of COPD presents to the ED c/o worsening shortness of breath over the past few days. Pt was sent in from Dr. Dixon's office. Pt hypoxic to 91%. Will send pt to main ED for further evaluation.

## 2022-09-12 NOTE — PHYSICAL EXAM
[No Acute Distress] : no acute distress [Well Nourished] : well nourished [Well Developed] : well developed [Well-Appearing] : well-appearing [Normal Sclera/Conjunctiva] : normal sclera/conjunctiva [PERRL] : pupils equal round and reactive to light [EOMI] : extraocular movements intact [Normal Outer Ear/Nose] : the outer ears and nose were normal in appearance [Normal Oropharynx] : the oropharynx was normal [No JVD] : no jugular venous distention [No Lymphadenopathy] : no lymphadenopathy [Supple] : supple [Thyroid Normal, No Nodules] : the thyroid was normal and there were no nodules present [No Respiratory Distress] : no respiratory distress  [No Accessory Muscle Use] : no accessory muscle use [Normal Rate] : normal rate  [Regular Rhythm] : with a regular rhythm [Normal S1, S2] : normal S1 and S2 [No Murmur] : no murmur heard [No Carotid Bruits] : no carotid bruits [No Abdominal Bruit] : a ~M bruit was not heard ~T in the abdomen [No Varicosities] : no varicosities [Pedal Pulses Present] : the pedal pulses are present [No Edema] : there was no peripheral edema [No Palpable Aorta] : no palpable aorta [No Extremity Clubbing/Cyanosis] : no extremity clubbing/cyanosis [Soft] : abdomen soft [Non Tender] : non-tender [Non-distended] : non-distended [No Masses] : no abdominal mass palpated [No HSM] : no HSM [Normal Bowel Sounds] : normal bowel sounds [Normal Posterior Cervical Nodes] : no posterior cervical lymphadenopathy [Normal Anterior Cervical Nodes] : no anterior cervical lymphadenopathy [No CVA Tenderness] : no CVA  tenderness [No Spinal Tenderness] : no spinal tenderness [No Joint Swelling] : no joint swelling [Grossly Normal Strength/Tone] : grossly normal strength/tone [No Rash] : no rash [Coordination Grossly Intact] : coordination grossly intact [No Focal Deficits] : no focal deficits [Normal Gait] : normal gait [Deep Tendon Reflexes (DTR)] : deep tendon reflexes were 2+ and symmetric [Normal Affect] : the affect was normal [Normal Insight/Judgement] : insight and judgment were intact [de-identified] : Bilateral expiratory rhonchi; no audible wheezing.

## 2022-09-12 NOTE — ED PROVIDER NOTE - OBJECTIVE STATEMENT
61 y/o male with PMHx of COPD presents to the ED c/o worsening SOB over the past few days that pt says feels like COPD exacerbation. Pt also c/o two lumps on the left side of his chest. Pt was sent in from Dr. Dixon's office. Pt is not on a blood thinner and is not on O2 at home.

## 2022-09-12 NOTE — ED PROVIDER NOTE - CLINICAL SUMMARY MEDICAL DECISION MAKING FREE TEXT BOX
Appearing COPD exacerbation, will evaluate for pneumonia. Provide treatment, reassess. Exam c/w COPD exacerbation. Will evaluate for pneumonia. Will provide treatment for copd, reassess.  Not suspicious for acs or PE - below threshold for further w/u.

## 2022-09-12 NOTE — ED PROVIDER NOTE - NS ED ROS FT
Constitutional: nad, well appearing  HEENT:  no nasal congestion, eye drainage or ear pain.    CVS:  no cp  Resp: +SOB  GI:  no abdominal pain, no nausea or vomiting  :  no dysuria  MSK: no joint pain or limited ROM  Skin: +LUMPS ON LEFT SIDE OF CHEST  Neuro: no change in mental status or level of consciousness  Heme/lymph: no bleeding

## 2022-09-12 NOTE — HISTORY OF PRESENT ILLNESS
[FreeTextEntry1] : Follow-up [de-identified] : Mr. Jones presents for follow-up evaluation.  Patient is complaining of significant shortness of breath.  He has audible wheezing.  He remains morbidly obese.  He is gained 12 pounds since his last evaluation.  He was last seen in this office on 6/20/2022 by Dr. Delgado at which time he was sent to the emergency room for evaluation and admission.

## 2022-09-12 NOTE — ED PROVIDER NOTE - CARE PLAN
Principal Discharge DX:	COPD, mild  Secondary Diagnosis:	Breast lump   1 Principal Discharge DX:	COPD exacerbation  Secondary Diagnosis:	Breast lump

## 2022-09-12 NOTE — ED PROVIDER NOTE - PROGRESS NOTE DETAILS
PA: Patient is a 63 y/o male with PMHx of COPD who presents to [facility] c/o worsening SOB and cough over the past few days. Pt also c/o two lumps on the left side of his chest. Pt was sent in from Dr. Dixon's office. Pt is not on a blood thinner and is not on O2 at home. ~Keyon Lopez PA-C PA: Patient is a 63 y/o male with PMHx of COPD who presents to [facility] c/o worsening SOB and cough over the past few days. Pt also c/o two lumps on the left side of his chest. Pt was sent in from Dr. Dixon's office. Pt is not on a blood thinner and is not on O2 at home. ~Keyon Lopez PA-C    Patient seen and evaluated. Will treat for COPD exacerbation. Advised to f/u with breast surgery re left breast lump. ~Keyon Lopez PA-C Patient not feeling any better despite IV meds and NEBS, appears significantly SOB with exertion. Spoke with hospitalist dr. Hill, will admit patient for COPD exacerbation. ~Keyon Lopez PA-C

## 2022-09-12 NOTE — ED PROVIDER NOTE - NSFOLLOWUPINSTRUCTIONS_ED_ALL_ED_FT
COPD (CHRONIC OBSTRUCTIVE PULMONARY DISEASE) - General Information           COPD (Chronic Obstructive Pulmonary Disease)    WHAT YOU NEED TO KNOW:    What is COPD? COPD (chronic obstructive pulmonary disease) is a lung disease that causes breathing problems. COPD usually develops from years of irritation and inflammation in your lungs. Obstructive means airflow is blocked. This limits airflow out of your lungs. Smoking, breathing in pollution, genetics, or a history of lung infections can increase your risk for COPD.  Inspiration and Expiration         What are the signs and symptoms of COPD?   •Shortness of breath      •A dry cough      •Coughing fits that bring up mucus from your lungs      •Wheezing and chest tightness      How is COPD treated? Healthcare providers will work with you to create a care plan. The plan will contain goals defined by you and your providers. It will include steps to help you reach your goals within a specific time frame. The plan may change over time as your goals and needs change. The following may be included in your plan:  •Medicines may be used to open your airway or decrease swelling and inflammation in your lungs. Antibiotics may be given for up to 5 days to treat a bacterial infection during an exacerbation. Medicines can relieve certain kinds of symptoms. A short-acting medicine relieves symptoms quickly. This may be called rescue medicine. A long-acting medicine controls or prevents symptoms. This may be called maintenance medicine. Your care plan will have directions for when to take each kind of medicine. Your healthcare provider will give you more information about the medicines you are given and how to use them safely.      •Extra oxygen may help you breathe easier and feel more alert if you have severe COPD. Do not increase your oxygen levels without speaking to your doctor first.      •Surgery is sometimes done if all other treatments have failed. A lung reduction is surgery to remove part of your damaged lung. A lung transplant is the replacement of your lung with a donor lung.      What can I do to help make breathing easier?   •Use pursed-lip breathing any time you feel short of breath. Take a deep breath in through your nose. Slowly breathe out through your mouth with your lips pursed. Try to take 2 times as long to breathe out as to breathe in. This helps you get rid of as much air from your lungs as possible. You can also practice this breathing pattern while you bend, lift, climb stairs, or exercise. It slows down your breathing and helps move more air in and out of your lungs.  Breathe in Breathe out           •Avoid anything that makes your symptoms worse. Stay out of high altitudes and places with high humidity. Stay inside, or cover your mouth and nose with a scarf when you are outside in cold weather. Stay inside on days when air pollution or pollen counts are high. Do not use aerosol sprays such as deodorant, bug spray, and hairspray.      •Exercise as directed. Your healthcare provider may recommend at least 20 minutes of exercise each day to help increase your energy and decrease shortness of breath. Talk to your provider about the best exercise plan for you.   FAMILY WALKING FOR EXERCISE           How can I manage COPD and help prevent exacerbations? COPD is a serious condition that gets worse over time. A COPD exacerbation means your symptoms suddenly get worse. It is important to prevent exacerbations. An exacerbation can cause more lung damage. COPD cannot be cured, but you can take action to feel better and prevent exacerbations:  •Do not smoke. Nicotine and other chemicals in cigarettes and cigars can cause lung damage and make your COPD worse. Ask your healthcare provider for information if you currently smoke and need help to quit. E-cigarettes or smokeless tobacco still contain nicotine. Talk to your healthcare provider before you use these products.      •Avoid secondhand smoke. This is smoke another person exhales. Even if you have never smoked or have quit, it is important to avoid secondhand smoke. This smoke can also cause lung damage or trigger an exacerbation.      •Go to pulmonary rehabilitation (rehab) if directed. Rehab is a program run by specialists who help you learn to manage COPD. Examples include a pulmonologist (lung specialist), dietitian, or exercise therapist. The specialists will help you make a plan to avoid triggers that cause an exacerbation.      •Take your medicines as directed. Refill your medicines before you are out so that you do not miss a dose. Ask your healthcare provider if you have any questions on how to take your medicines.      •Protect yourself from germs. Germs can get into your lungs and cause an infection. An infection in your lungs can create more mucus and make it harder to breathe. An infection can also create swelling in your airway and prevent air from getting in. You can decrease your risk for infection by doing the following:        ?Wash your hands often with soap and water. Carry germ-killing gel with you. You can use the gel to clean your hands when soap and water are not available.  Handwashing           ?Do not touch your eyes, nose, or mouth unless you have washed your hands first.      ?Always cover your mouth when you cough. Cough into a tissue or your shirtsleeve so you do not spread germs from your hands.      ?Try to avoid people who have a cold or the flu. If you are sick, stay away from others as much as possible.      ?Ask about vaccines you may need. Influenza (the flu), pneumonia, and COVID-19 can become life-threatening for a person who has COPD. Get a yearly flu vaccine as soon as recommended, usually in September or October. The pneumonia vaccine may be given every 5 years, or as directed. COVID-19 vaccines are available in shots given in 1 or 2 doses. Your healthcare provider can tell you if you should also get other vaccines, and when to get them.      •Drink liquids as directed. You may need to drink more liquid than usual. Liquid will help to keep your air passages moist and help you cough up mucus. Ask how much liquid to drink each day and which liquids are best for you.      Call your local emergency number (911 in the ) if:   •You feel lightheaded, short of breath, and have chest pain.          When should I seek immediate care?   •You cough up blood.      •You are confused, dizzy, or feel faint.      •Your arm or leg feels warm, tender, and painful. It may look swollen and red.      When should I call my doctor?   •You have increased shortness of breath.      •You need more medicine than usual to control your symptoms.      •You are coughing or wheezing more than usual.      •You are coughing up more mucus, or it has a new color or odor.      •You gain more than 3 pounds in a week.      •You have a fever, a runny or stuffy nose, and a sore throat, or other cold or flu symptoms.      •Your skin, lips, or nails start to turn blue.      •You have swelling in your legs or ankles.      •You are very tired or weak for more than a day.      •You notice changes in your mood, or changes in your ability to think or concentrate.      •You have questions or concerns about your condition or care.      CARE AGREEMENT:    You have the right to help plan your care. Learn about your health condition and how it may be treated. Discuss treatment options with your healthcare providers to decide what care you want to receive. You always have the right to refuse treatment.            BREAST MASS - Ambulatory Care           Breast Mass    AMBULATORY CARE:    A breast mass is a lump or growth in your breast or underarm. A cyst (fluid-filled pocket), an injury, or changes in your breast tissue are the most common causes. A breast mass can also be caused by cancer. You must follow up as directed to find the cause of your breast mass.    Call your doctor if:   •Your breast mass returns or grows larger.      •You have pain in your breast or underarm.      •You have nipple discharge.      •You notice other changes to your breasts or nipples, such as dimpling or a rash.      •You had an aspiration and you have redness, pain, swelling, or warmth near the aspiration site.      •You have a fever.      •You have questions or concerns about your condition or care.      Continue to do breast self-exams as directed: Check your breast for changes in size, shape, or feel of the breast tissue. Check under your arms and all around your breasts. If you have monthly periods, examine your breasts after your period is over. Contact your healthcare provider if you notice any changes in your breasts. If you have questions, ask for more information on how to do a breast self-exam.           Follow up with your doctor as directed: You may need to return for more tests. Write down your questions so you remember to ask them during your visits.

## 2022-09-12 NOTE — ED PROVIDER NOTE - NS ED ATTENDING STATEMENT MOD
This was a shared visit with the GER. I reviewed and verified the documentation and independently performed the documented:

## 2022-09-12 NOTE — ED PROVIDER NOTE - PHYSICAL EXAMINATION
Constitutional: NAD, well appearing  HEENT: no rhinorrhea, PERRL, no oropharyngeal erythema or exudates, midline uvula.  TMs clear.  CVS:  RRR, no m/r/g  Resp:  Diffuse wheezing bilaterally.  GI: soft, ntnd  MSK:  no restriction to rom, full ROM to all extremities  Neuro:  A&Ox3, 5/5 strength to all extremities,  SILT to all extremities  Skin:  Small nodule to the left chest with surrounding ecchymosis.   psych: clear thought content  Heme/lymph:  No LAD Attending: Constitutional: NAD, well appearing  HEENT: no rhinorrhea, PERRL, no oropharyngeal erythema or exudates, midline uvula.  TMs clear.  CVS:  RRR, no m/r/g  Resp:  Diffuse wheezing bilaterally.  GI: soft, ntnd  MSK:  no restriction to rom, full ROM to all extremities  Neuro:  A&Ox3, 5/5 strength to all extremities,  SILT to all extremities  Skin:  Small nodule to the left chest with surrounding ecchymosis.   psych: clear thought content  Heme/lymph:  No LAD

## 2022-09-12 NOTE — ED ADULT TRIAGE NOTE - CHIEF COMPLAINT QUOTE
Pt reports SOB with hx of COPD. Pt reports that his symptoms have worsened over past few days. Denies sick contacts.  Pt was seen at Dr. Dixon's office and told to come to ED.

## 2022-09-12 NOTE — PLAN
[FreeTextEntry1] : Mr. Jones presents for a follow-up evaluation.\par \par 1.  Patient is complaining of shortness of breath and has significant bronchospasm with audible wheezing.  He continues on Symbicort 160/4.5 mcg 2 puffs twice daily with albuterol/ipratropium bromide nebulizer as needed.  Patient is in moderate respiratory distress.  He will go to the emergency room for evaluation and treatment.\par \par #2.  Patient is concerned about ecchymosis above the left breast.  He states that he feels 2 lumps in his breast.  He will need surgical evaluation.

## 2022-09-13 DIAGNOSIS — G47.33 OBSTRUCTIVE SLEEP APNEA (ADULT) (PEDIATRIC): ICD-10-CM

## 2022-09-13 DIAGNOSIS — E11.9 TYPE 2 DIABETES MELLITUS WITHOUT COMPLICATIONS: ICD-10-CM

## 2022-09-13 DIAGNOSIS — R06.02 SHORTNESS OF BREATH: ICD-10-CM

## 2022-09-13 DIAGNOSIS — J98.01 ACUTE BRONCHOSPASM: ICD-10-CM

## 2022-09-13 DIAGNOSIS — J44.1 CHRONIC OBSTRUCTIVE PULMONARY DISEASE WITH (ACUTE) EXACERBATION: ICD-10-CM

## 2022-09-13 LAB
A1C WITH ESTIMATED AVERAGE GLUCOSE RESULT: 8.6 % — HIGH (ref 4–5.6)
ALBUMIN SERPL ELPH-MCNC: 3.2 G/DL — LOW (ref 3.3–5)
ALBUMIN SERPL ELPH-MCNC: 3.4 G/DL — SIGNIFICANT CHANGE UP (ref 3.3–5)
ALP SERPL-CCNC: 58 U/L — SIGNIFICANT CHANGE UP (ref 40–120)
ALP SERPL-CCNC: 60 U/L — SIGNIFICANT CHANGE UP (ref 40–120)
ALT FLD-CCNC: 26 U/L — SIGNIFICANT CHANGE UP (ref 12–78)
ALT FLD-CCNC: 30 U/L — SIGNIFICANT CHANGE UP (ref 12–78)
ANION GAP SERPL CALC-SCNC: 3 MMOL/L — LOW (ref 5–17)
ANION GAP SERPL CALC-SCNC: 7 MMOL/L — SIGNIFICANT CHANGE UP (ref 5–17)
APPEARANCE UR: CLEAR — SIGNIFICANT CHANGE UP
APTT BLD: 30.4 SEC — SIGNIFICANT CHANGE UP (ref 27.5–35.5)
APTT BLD: 33.3 SEC — SIGNIFICANT CHANGE UP (ref 27.5–35.5)
AST SERPL-CCNC: 16 U/L — SIGNIFICANT CHANGE UP (ref 15–37)
AST SERPL-CCNC: 9 U/L — LOW (ref 15–37)
BASOPHILS # BLD AUTO: 0.03 K/UL — SIGNIFICANT CHANGE UP (ref 0–0.2)
BASOPHILS NFR BLD AUTO: 0.5 % — SIGNIFICANT CHANGE UP (ref 0–2)
BILIRUB SERPL-MCNC: 0.3 MG/DL — SIGNIFICANT CHANGE UP (ref 0.2–1.2)
BILIRUB SERPL-MCNC: 0.6 MG/DL — SIGNIFICANT CHANGE UP (ref 0.2–1.2)
BILIRUB UR-MCNC: NEGATIVE — SIGNIFICANT CHANGE UP
BUN SERPL-MCNC: 21 MG/DL — SIGNIFICANT CHANGE UP (ref 7–23)
BUN SERPL-MCNC: 22 MG/DL — SIGNIFICANT CHANGE UP (ref 7–23)
CALCIUM SERPL-MCNC: 9 MG/DL — SIGNIFICANT CHANGE UP (ref 8.5–10.1)
CALCIUM SERPL-MCNC: 9.1 MG/DL — SIGNIFICANT CHANGE UP (ref 8.5–10.1)
CHLORIDE SERPL-SCNC: 102 MMOL/L — SIGNIFICANT CHANGE UP (ref 96–108)
CHLORIDE SERPL-SCNC: 103 MMOL/L — SIGNIFICANT CHANGE UP (ref 96–108)
CHOLEST SERPL-MCNC: 182 MG/DL — SIGNIFICANT CHANGE UP
CO2 SERPL-SCNC: 27 MMOL/L — SIGNIFICANT CHANGE UP (ref 22–31)
CO2 SERPL-SCNC: 33 MMOL/L — HIGH (ref 22–31)
COLOR SPEC: YELLOW — SIGNIFICANT CHANGE UP
CREAT SERPL-MCNC: 0.87 MG/DL — SIGNIFICANT CHANGE UP (ref 0.5–1.3)
CREAT SERPL-MCNC: 0.98 MG/DL — SIGNIFICANT CHANGE UP (ref 0.5–1.3)
D DIMER BLD IA.RAPID-MCNC: <150 NG/ML DDU — SIGNIFICANT CHANGE UP
DIFF PNL FLD: NEGATIVE — SIGNIFICANT CHANGE UP
EGFR: 86 ML/MIN/1.73M2 — SIGNIFICANT CHANGE UP
EGFR: 96 ML/MIN/1.73M2 — SIGNIFICANT CHANGE UP
EOSINOPHIL # BLD AUTO: 0.01 K/UL — SIGNIFICANT CHANGE UP (ref 0–0.5)
EOSINOPHIL NFR BLD AUTO: 0.2 % — SIGNIFICANT CHANGE UP (ref 0–6)
ESTIMATED AVERAGE GLUCOSE: 200 MG/DL — HIGH (ref 68–114)
GLUCOSE BLDC GLUCOMTR-MCNC: 328 MG/DL — HIGH (ref 70–99)
GLUCOSE SERPL-MCNC: 140 MG/DL — HIGH (ref 70–99)
GLUCOSE SERPL-MCNC: 362 MG/DL — HIGH (ref 70–99)
GLUCOSE UR QL: 1000 MG/DL
HCT VFR BLD CALC: 43.3 % — SIGNIFICANT CHANGE UP (ref 39–50)
HDLC SERPL-MCNC: 57 MG/DL — SIGNIFICANT CHANGE UP
HGB BLD-MCNC: 14.7 G/DL — SIGNIFICANT CHANGE UP (ref 13–17)
IMM GRANULOCYTES NFR BLD AUTO: 0.3 % — SIGNIFICANT CHANGE UP (ref 0–1.5)
INR BLD: 1.04 RATIO — SIGNIFICANT CHANGE UP (ref 0.88–1.16)
INR BLD: 1.05 RATIO — SIGNIFICANT CHANGE UP (ref 0.88–1.16)
KETONES UR-MCNC: NEGATIVE — SIGNIFICANT CHANGE UP
LEUKOCYTE ESTERASE UR-ACNC: NEGATIVE — SIGNIFICANT CHANGE UP
LIPID PNL WITH DIRECT LDL SERPL: 117 MG/DL — HIGH
LYMPHOCYTES # BLD AUTO: 0.47 K/UL — LOW (ref 1–3.3)
LYMPHOCYTES # BLD AUTO: 8.2 % — LOW (ref 13–44)
MAGNESIUM SERPL-MCNC: 2.1 MG/DL — SIGNIFICANT CHANGE UP (ref 1.6–2.6)
MCHC RBC-ENTMCNC: 30.2 PG — SIGNIFICANT CHANGE UP (ref 27–34)
MCHC RBC-ENTMCNC: 33.9 GM/DL — SIGNIFICANT CHANGE UP (ref 32–36)
MCV RBC AUTO: 89.1 FL — SIGNIFICANT CHANGE UP (ref 80–100)
MONOCYTES # BLD AUTO: 0.05 K/UL — SIGNIFICANT CHANGE UP (ref 0–0.9)
MONOCYTES NFR BLD AUTO: 0.9 % — LOW (ref 2–14)
NEUTROPHILS # BLD AUTO: 5.15 K/UL — SIGNIFICANT CHANGE UP (ref 1.8–7.4)
NEUTROPHILS NFR BLD AUTO: 89.9 % — HIGH (ref 43–77)
NITRITE UR-MCNC: NEGATIVE — SIGNIFICANT CHANGE UP
NON HDL CHOLESTEROL: 125 MG/DL — SIGNIFICANT CHANGE UP
NT-PROBNP SERPL-SCNC: 46 PG/ML — SIGNIFICANT CHANGE UP (ref 0–125)
PH UR: 5 — SIGNIFICANT CHANGE UP (ref 5–8)
PLATELET # BLD AUTO: 285 K/UL — SIGNIFICANT CHANGE UP (ref 150–400)
POTASSIUM SERPL-MCNC: 3.6 MMOL/L — SIGNIFICANT CHANGE UP (ref 3.5–5.3)
POTASSIUM SERPL-MCNC: 3.8 MMOL/L — SIGNIFICANT CHANGE UP (ref 3.5–5.3)
POTASSIUM SERPL-SCNC: 3.6 MMOL/L — SIGNIFICANT CHANGE UP (ref 3.5–5.3)
POTASSIUM SERPL-SCNC: 3.8 MMOL/L — SIGNIFICANT CHANGE UP (ref 3.5–5.3)
PROT SERPL-MCNC: 6.1 GM/DL — SIGNIFICANT CHANGE UP (ref 6–8.3)
PROT SERPL-MCNC: 6.6 GM/DL — SIGNIFICANT CHANGE UP (ref 6–8.3)
PROT UR-MCNC: NEGATIVE — SIGNIFICANT CHANGE UP
PROTHROM AB SERPL-ACNC: 12.1 SEC — SIGNIFICANT CHANGE UP (ref 10.5–13.4)
PROTHROM AB SERPL-ACNC: 12.2 SEC — SIGNIFICANT CHANGE UP (ref 10.5–13.4)
RAPID RVP RESULT: SIGNIFICANT CHANGE UP
RBC # BLD: 4.86 M/UL — SIGNIFICANT CHANGE UP (ref 4.2–5.8)
RBC # FLD: 13.2 % — SIGNIFICANT CHANGE UP (ref 10.3–14.5)
SARS-COV-2 RNA SPEC QL NAA+PROBE: SIGNIFICANT CHANGE UP
SODIUM SERPL-SCNC: 136 MMOL/L — SIGNIFICANT CHANGE UP (ref 135–145)
SODIUM SERPL-SCNC: 139 MMOL/L — SIGNIFICANT CHANGE UP (ref 135–145)
SP GR SPEC: 1.02 — SIGNIFICANT CHANGE UP (ref 1.01–1.02)
TRIGL SERPL-MCNC: 39 MG/DL — SIGNIFICANT CHANGE UP
TROPONIN I, HIGH SENSITIVITY RESULT: 6.67 NG/L — SIGNIFICANT CHANGE UP
UROBILINOGEN FLD QL: NEGATIVE — SIGNIFICANT CHANGE UP
WBC # BLD: 5.73 K/UL — SIGNIFICANT CHANGE UP (ref 3.8–10.5)
WBC # FLD AUTO: 5.73 K/UL — SIGNIFICANT CHANGE UP (ref 3.8–10.5)

## 2022-09-13 PROCEDURE — 80048 BASIC METABOLIC PNL TOTAL CA: CPT

## 2022-09-13 PROCEDURE — 93970 EXTREMITY STUDY: CPT | Mod: 26

## 2022-09-13 PROCEDURE — 93308 TTE F-UP OR LMTD: CPT

## 2022-09-13 PROCEDURE — 85610 PROTHROMBIN TIME: CPT

## 2022-09-13 PROCEDURE — 94760 N-INVAS EAR/PLS OXIMETRY 1: CPT

## 2022-09-13 PROCEDURE — 93970 EXTREMITY STUDY: CPT

## 2022-09-13 PROCEDURE — 80053 COMPREHEN METABOLIC PANEL: CPT

## 2022-09-13 PROCEDURE — 80061 LIPID PANEL: CPT

## 2022-09-13 PROCEDURE — 99223 1ST HOSP IP/OBS HIGH 75: CPT

## 2022-09-13 PROCEDURE — 93005 ELECTROCARDIOGRAM TRACING: CPT

## 2022-09-13 PROCEDURE — 85025 COMPLETE CBC W/AUTO DIFF WBC: CPT

## 2022-09-13 PROCEDURE — 93308 TTE F-UP OR LMTD: CPT | Mod: 26

## 2022-09-13 PROCEDURE — 83036 HEMOGLOBIN GLYCOSYLATED A1C: CPT

## 2022-09-13 PROCEDURE — 94640 AIRWAY INHALATION TREATMENT: CPT

## 2022-09-13 PROCEDURE — 71250 CT THORAX DX C-: CPT | Mod: 26

## 2022-09-13 PROCEDURE — 12345: CPT | Mod: NC

## 2022-09-13 PROCEDURE — 71250 CT THORAX DX C-: CPT

## 2022-09-13 PROCEDURE — 85730 THROMBOPLASTIN TIME PARTIAL: CPT

## 2022-09-13 PROCEDURE — 82962 GLUCOSE BLOOD TEST: CPT

## 2022-09-13 PROCEDURE — 99255 IP/OBS CONSLTJ NEW/EST HI 80: CPT

## 2022-09-13 PROCEDURE — 93010 ELECTROCARDIOGRAM REPORT: CPT

## 2022-09-13 PROCEDURE — 94618 PULMONARY STRESS TESTING: CPT

## 2022-09-13 PROCEDURE — 36415 COLL VENOUS BLD VENIPUNCTURE: CPT

## 2022-09-13 RX ORDER — DEXTROSE 50 % IN WATER 50 %
25 SYRINGE (ML) INTRAVENOUS ONCE
Refills: 0 | Status: DISCONTINUED | OUTPATIENT
Start: 2022-09-13 | End: 2022-09-17

## 2022-09-13 RX ORDER — ALBUTEROL 90 UG/1
2 AEROSOL, METERED ORAL EVERY 4 HOURS
Refills: 0 | Status: DISCONTINUED | OUTPATIENT
Start: 2022-09-13 | End: 2022-09-13

## 2022-09-13 RX ORDER — ATORVASTATIN CALCIUM 80 MG/1
40 TABLET, FILM COATED ORAL AT BEDTIME
Refills: 0 | Status: DISCONTINUED | OUTPATIENT
Start: 2022-09-13 | End: 2022-09-17

## 2022-09-13 RX ORDER — ATORVASTATIN CALCIUM 80 MG/1
40 TABLET, FILM COATED ORAL AT BEDTIME
Refills: 0 | Status: DISCONTINUED | OUTPATIENT
Start: 2022-09-13 | End: 2022-09-13

## 2022-09-13 RX ORDER — ALBUTEROL 90 UG/1
2 AEROSOL, METERED ORAL EVERY 4 HOURS
Refills: 0 | Status: DISCONTINUED | OUTPATIENT
Start: 2022-09-13 | End: 2022-09-17

## 2022-09-13 RX ORDER — INSULIN GLARGINE 100 [IU]/ML
40 INJECTION, SOLUTION SUBCUTANEOUS EVERY MORNING
Refills: 0 | Status: DISCONTINUED | OUTPATIENT
Start: 2022-09-13 | End: 2022-09-14

## 2022-09-13 RX ORDER — OXYCODONE AND ACETAMINOPHEN 5; 325 MG/1; MG/1
2 TABLET ORAL EVERY 6 HOURS
Refills: 0 | Status: DISCONTINUED | OUTPATIENT
Start: 2022-09-13 | End: 2022-09-17

## 2022-09-13 RX ORDER — METFORMIN HYDROCHLORIDE 850 MG/1
1 TABLET ORAL
Qty: 0 | Refills: 0 | DISCHARGE

## 2022-09-13 RX ORDER — DEXTROSE 50 % IN WATER 50 %
15 SYRINGE (ML) INTRAVENOUS ONCE
Refills: 0 | Status: DISCONTINUED | OUTPATIENT
Start: 2022-09-13 | End: 2022-09-17

## 2022-09-13 RX ORDER — ATORVASTATIN CALCIUM 80 MG/1
1 TABLET, FILM COATED ORAL
Qty: 0 | Refills: 0 | DISCHARGE

## 2022-09-13 RX ORDER — INSULIN LISPRO 100/ML
VIAL (ML) SUBCUTANEOUS
Refills: 0 | Status: DISCONTINUED | OUTPATIENT
Start: 2022-09-13 | End: 2022-09-17

## 2022-09-13 RX ORDER — INFLUENZA VIRUS VACCINE 15; 15; 15; 15 UG/.5ML; UG/.5ML; UG/.5ML; UG/.5ML
0.5 SUSPENSION INTRAMUSCULAR ONCE
Refills: 0 | Status: DISCONTINUED | OUTPATIENT
Start: 2022-09-13 | End: 2022-09-17

## 2022-09-13 RX ORDER — ENOXAPARIN SODIUM 100 MG/ML
40 INJECTION SUBCUTANEOUS EVERY 24 HOURS
Refills: 0 | Status: DISCONTINUED | OUTPATIENT
Start: 2022-09-13 | End: 2022-09-13

## 2022-09-13 RX ORDER — ENOXAPARIN SODIUM 100 MG/ML
40 INJECTION SUBCUTANEOUS EVERY 12 HOURS
Refills: 0 | Status: DISCONTINUED | OUTPATIENT
Start: 2022-09-13 | End: 2022-09-17

## 2022-09-13 RX ORDER — SPIRONOLACTONE 25 MG/1
1 TABLET, FILM COATED ORAL
Qty: 0 | Refills: 0 | DISCHARGE

## 2022-09-13 RX ORDER — SPIRONOLACTONE 25 MG/1
25 TABLET, FILM COATED ORAL DAILY
Refills: 0 | Status: DISCONTINUED | OUTPATIENT
Start: 2022-09-13 | End: 2022-09-17

## 2022-09-13 RX ORDER — FUROSEMIDE 40 MG
20 TABLET ORAL ONCE
Refills: 0 | Status: COMPLETED | OUTPATIENT
Start: 2022-09-13 | End: 2022-09-13

## 2022-09-13 RX ORDER — LOSARTAN POTASSIUM 100 MG/1
1 TABLET, FILM COATED ORAL
Qty: 0 | Refills: 0 | DISCHARGE

## 2022-09-13 RX ORDER — LOSARTAN POTASSIUM 100 MG/1
100 TABLET, FILM COATED ORAL DAILY
Refills: 0 | Status: DISCONTINUED | OUTPATIENT
Start: 2022-09-13 | End: 2022-09-17

## 2022-09-13 RX ORDER — GABAPENTIN 400 MG/1
800 CAPSULE ORAL THREE TIMES A DAY
Refills: 0 | Status: DISCONTINUED | OUTPATIENT
Start: 2022-09-13 | End: 2022-09-17

## 2022-09-13 RX ORDER — GLUCAGON INJECTION, SOLUTION 0.5 MG/.1ML
1 INJECTION, SOLUTION SUBCUTANEOUS ONCE
Refills: 0 | Status: DISCONTINUED | OUTPATIENT
Start: 2022-09-13 | End: 2022-09-17

## 2022-09-13 RX ORDER — INSULIN LISPRO 100/ML
VIAL (ML) SUBCUTANEOUS AT BEDTIME
Refills: 0 | Status: DISCONTINUED | OUTPATIENT
Start: 2022-09-13 | End: 2022-09-17

## 2022-09-13 RX ORDER — SODIUM CHLORIDE 9 MG/ML
1000 INJECTION, SOLUTION INTRAVENOUS
Refills: 0 | Status: DISCONTINUED | OUTPATIENT
Start: 2022-09-13 | End: 2022-09-17

## 2022-09-13 RX ORDER — INSULIN DEGLUDEC 100 U/ML
50 INJECTION, SOLUTION SUBCUTANEOUS
Qty: 0 | Refills: 0 | DISCHARGE

## 2022-09-13 RX ORDER — TIOTROPIUM BROMIDE 18 UG/1
1 CAPSULE ORAL; RESPIRATORY (INHALATION) DAILY
Refills: 0 | Status: DISCONTINUED | OUTPATIENT
Start: 2022-09-13 | End: 2022-09-17

## 2022-09-13 RX ORDER — BUDESONIDE AND FORMOTEROL FUMARATE DIHYDRATE 160; 4.5 UG/1; UG/1
2 AEROSOL RESPIRATORY (INHALATION)
Refills: 0 | Status: DISCONTINUED | OUTPATIENT
Start: 2022-09-13 | End: 2022-09-17

## 2022-09-13 RX ORDER — GABAPENTIN 400 MG/1
1 CAPSULE ORAL
Qty: 0 | Refills: 0 | DISCHARGE

## 2022-09-13 RX ORDER — DEXTROSE 50 % IN WATER 50 %
12.5 SYRINGE (ML) INTRAVENOUS ONCE
Refills: 0 | Status: DISCONTINUED | OUTPATIENT
Start: 2022-09-13 | End: 2022-09-17

## 2022-09-13 RX ADMIN — INSULIN GLARGINE 40 UNIT(S): 100 INJECTION, SOLUTION SUBCUTANEOUS at 12:37

## 2022-09-13 RX ADMIN — Medication 100 MILLIGRAM(S): at 12:36

## 2022-09-13 RX ADMIN — LOSARTAN POTASSIUM 100 MILLIGRAM(S): 100 TABLET, FILM COATED ORAL at 07:57

## 2022-09-13 RX ADMIN — ATORVASTATIN CALCIUM 40 MILLIGRAM(S): 80 TABLET, FILM COATED ORAL at 21:57

## 2022-09-13 RX ADMIN — ALBUTEROL 2 PUFF(S): 90 AEROSOL, METERED ORAL at 20:09

## 2022-09-13 RX ADMIN — Medication 1200 MILLIGRAM(S): at 12:36

## 2022-09-13 RX ADMIN — OXYCODONE AND ACETAMINOPHEN 2 TABLET(S): 5; 325 TABLET ORAL at 14:00

## 2022-09-13 RX ADMIN — GABAPENTIN 800 MILLIGRAM(S): 400 CAPSULE ORAL at 15:54

## 2022-09-13 RX ADMIN — Medication 40 MILLIGRAM(S): at 07:56

## 2022-09-13 RX ADMIN — Medication 5: at 17:48

## 2022-09-13 RX ADMIN — SPIRONOLACTONE 25 MILLIGRAM(S): 25 TABLET, FILM COATED ORAL at 07:57

## 2022-09-13 RX ADMIN — Medication 3: at 12:41

## 2022-09-13 RX ADMIN — Medication 40 MILLIGRAM(S): at 21:57

## 2022-09-13 RX ADMIN — Medication 3 MILLILITER(S): at 00:15

## 2022-09-13 RX ADMIN — OXYCODONE AND ACETAMINOPHEN 2 TABLET(S): 5; 325 TABLET ORAL at 06:58

## 2022-09-13 RX ADMIN — Medication 125 MILLIGRAM(S): at 00:12

## 2022-09-13 RX ADMIN — OXYCODONE AND ACETAMINOPHEN 2 TABLET(S): 5; 325 TABLET ORAL at 12:36

## 2022-09-13 RX ADMIN — Medication 40 MILLIGRAM(S): at 14:00

## 2022-09-13 RX ADMIN — Medication 150 GRAM(S): at 00:15

## 2022-09-13 RX ADMIN — GABAPENTIN 800 MILLIGRAM(S): 400 CAPSULE ORAL at 06:28

## 2022-09-13 RX ADMIN — ENOXAPARIN SODIUM 40 MILLIGRAM(S): 100 INJECTION SUBCUTANEOUS at 21:57

## 2022-09-13 RX ADMIN — TIOTROPIUM BROMIDE 1 CAPSULE(S): 18 CAPSULE ORAL; RESPIRATORY (INHALATION) at 11:14

## 2022-09-13 RX ADMIN — OXYCODONE AND ACETAMINOPHEN 2 TABLET(S): 5; 325 TABLET ORAL at 21:56

## 2022-09-13 RX ADMIN — ALBUTEROL 2 PUFF(S): 90 AEROSOL, METERED ORAL at 15:19

## 2022-09-13 RX ADMIN — Medication 3: at 21:58

## 2022-09-13 RX ADMIN — Medication 162 MILLIGRAM(S): at 00:12

## 2022-09-13 RX ADMIN — BUDESONIDE AND FORMOTEROL FUMARATE DIHYDRATE 2 PUFF(S): 160; 4.5 AEROSOL RESPIRATORY (INHALATION) at 20:09

## 2022-09-13 RX ADMIN — OXYCODONE AND ACETAMINOPHEN 2 TABLET(S): 5; 325 TABLET ORAL at 06:28

## 2022-09-13 RX ADMIN — Medication 20 MILLIGRAM(S): at 06:27

## 2022-09-13 RX ADMIN — BUDESONIDE AND FORMOTEROL FUMARATE DIHYDRATE 2 PUFF(S): 160; 4.5 AEROSOL RESPIRATORY (INHALATION) at 11:14

## 2022-09-13 RX ADMIN — ENOXAPARIN SODIUM 40 MILLIGRAM(S): 100 INJECTION SUBCUTANEOUS at 06:27

## 2022-09-13 RX ADMIN — Medication 4: at 08:01

## 2022-09-13 RX ADMIN — GABAPENTIN 800 MILLIGRAM(S): 400 CAPSULE ORAL at 21:56

## 2022-09-13 RX ADMIN — ALBUTEROL 2 PUFF(S): 90 AEROSOL, METERED ORAL at 11:14

## 2022-09-13 RX ADMIN — Medication 1200 MILLIGRAM(S): at 21:57

## 2022-09-13 NOTE — PROGRESS NOTE ADULT - SUBJECTIVE AND OBJECTIVE BOX
CC: SOB (13 Sep 2022 08:57)    HPI: 61 y/o M w/ PMH of COPD, DM2, p/w SOB. SOB has been there for several weeks, however, has gotten worse over the last 2 days. SOB feels like wheezing, and is exacerbated with exertion such as walking and relieved with rest. Denies cough, but does have some "spitting up". Denies runny nose / sore throat / fever / chills / nausea / vomiting / abdominal pain.    INTERVAL HPI/ OVERNIGHT EVENTS:    Vital Signs Last 24 Hrs  T(C): 36.5 (13 Sep 2022 09:06), Max: 36.8 (12 Sep 2022 19:35)  T(F): 97.7 (13 Sep 2022 09:06), Max: 98.3 (12 Sep 2022 19:35)  HR: 83 (13 Sep 2022 09:06) (68 - 83)  BP: 133/79 (13 Sep 2022 09:06) (125/68 - 145/80)  BP(mean): 91 (13 Sep 2022 04:36) (86 - 91)  RR: 18 (13 Sep 2022 09:06) (18 - 18)  SpO2: 92% (13 Sep 2022 09:06) (92% - 98%)    Parameters below as of 13 Sep 2022 09:06  Patient On (Oxygen Delivery Method): nasal cannula  O2 Flow (L/min): 2      REVIEW OF SYSTEMS:  All other review of systems is negative unless indicated above.      PHYSICAL EXAM:  General: Well developed; well nourished; in no acute distress  Eyes: PERRLA, EOMI; conjunctiva and sclera clear  Head: Normocephalic; atraumatic  ENMT: No nasal discharge; airway clear  Neck: Supple; non tender; no masses  Respiratory: No wheezes, rales or rhonchi  Cardiovascular: Regular rate and rhythm. S1 and S2 Normal; No murmurs, gallops or rubs  Gastrointestinal: Soft non-tender non-distended; Normal bowel sounds  Genitourinary: No  suprapubic  tenderness  Extremities: Normal range of motion, No clubbing, cyanosis or edema  Vascular: Peripheral pulses palpable 2+ bilaterally  Neurological: Alert and oriented x4  Skin: Warm and dry. No acute rash  Lymph Nodes: No acute cervical adenopathy  Musculoskeletal: Normal muscle tone, without deformities  Psychiatric: Cooperative and appropriate    Labs:                         14.7   5.73  )-----------( 285      ( 13 Sep 2022 07:42 )             43.3     13 Sep 2022 07:42    136    |  102    |  22     ----------------------------<  362    3.8     |  27     |  0.87     Ca    9.1        13 Sep 2022 07:42  Mg     2.1       12 Sep 2022 23:38    TPro  6.6    /  Alb  3.4    /  TBili  0.6    /  DBili  x      /  AST  16     /  ALT  30     /  AlkPhos  60     13 Sep 2022 07:42  PT/INR - ( 13 Sep 2022 07:42 )   PT: 12.1 sec;   INR: 1.04 ratio    PTT - ( 13 Sep 2022 07:42 )  PTT:33.3 sec      POCT Blood Glucose.: 328 mg/dL (13 Sep 2022 07:40)    LIVER FUNCTIONS - ( 13 Sep 2022 07:42 )  Alb: 3.4 g/dL / Pro: 6.6 gm/dL / ALK PHOS: 60 U/L / ALT: 30 U/L / AST: 16 U/L / GGT: x               Color: Yellow / Appearance: Clear / S.025 / pH: x  Gluc: x / Ketone: Negative  / Bili: Negative / Urobili: Negative   Blood: x / Protein: Negative / Nitrite: Negative   Leuk Esterase: Negative / RBC: x / WBC x   Sq Epi: x / Non Sq Epi: x / Bacteria: x        MEDICATIONS  (STANDING):  ALBUTerol    90 MICROgram(s) HFA Inhaler 2 Puff(s) Inhalation every 4 hours  atorvastatin 40 milliGRAM(s) Oral at bedtime  budesonide 160 MICROgram(s)/formoterol 4.5 MICROgram(s) Inhaler 2 Puff(s) Inhalation two times a day  dextrose 5%. 1000 milliLiter(s) (100 mL/Hr) IV Continuous <Continuous>  dextrose 5%. 1000 milliLiter(s) (50 mL/Hr) IV Continuous <Continuous>  dextrose 50% Injectable 25 Gram(s) IV Push once  dextrose 50% Injectable 12.5 Gram(s) IV Push once  dextrose 50% Injectable 25 Gram(s) IV Push once  enoxaparin Injectable 40 milliGRAM(s) SubCutaneous every 12 hours  gabapentin 800 milliGRAM(s) Oral three times a day  glucagon  Injectable 1 milliGRAM(s) IntraMuscular once  guaiFENesin ER 1200 milliGRAM(s) Oral every 12 hours  influenza   Vaccine 0.5 milliLiter(s) IntraMuscular once  insulin glargine Injectable (LANTUS) 40 Unit(s) SubCutaneous every morning  insulin lispro (ADMELOG) corrective regimen sliding scale   SubCutaneous three times a day before meals  insulin lispro (ADMELOG) corrective regimen sliding scale   SubCutaneous at bedtime  losartan 100 milliGRAM(s) Oral daily  methylPREDNISolone sodium succinate Injectable 40 milliGRAM(s) IV Push every 8 hours  spironolactone 25 milliGRAM(s) Oral daily  tiotropium 18 MICROgram(s) Capsule 1 Capsule(s) Inhalation daily    MEDICATIONS  (PRN):  benzonatate 100 milliGRAM(s) Oral every 8 hours PRN Cough  dextrose Oral Gel 15 Gram(s) Oral once PRN Blood Glucose LESS THAN 70 milliGRAM(s)/deciliter  oxycodone    5 mG/acetaminophen 325 mG 2 Tablet(s) Oral every 6 hours PRN Moderate Pain (4 - 6)      RADIOLOGY & ADDITIONAL TESTS:  < from: CT Chest No Cont (22 @ 09:26) >    ACC: [id number] EXAM:  CT CHEST                          PROCEDURE DATE:  2022          INTERPRETATION:  HISTORY: 62-year-old man. Shortness of breath.    EXAMINATION: CT CHEST was performed without IV contrast.    COMPARISON: No CT chest comparison available.    FINDINGS:    AIRWAYS, LUNGS, PLEURA: Right middle lobe and bilateral lower lobe   bronchiolectasis. Lingular and bibasilar subsegmental scarring. No focal   consolidation. No pleural effusion.    MEDIASTINUM: Heart size normal. Coronary atherosclerosis. No pericardial   effusion. Thoracic aorta normal caliber.  No large mediastinal lymph   nodes.    IMAGED ABDOMEN: Laparoscopic gastric band in place.    SOFT TISSUES: Bilateral gynecomastia.    BONES: Unremarkable.      IMPRESSION:.    No focal consolidation.    Coronary atherosclerosis.       CC: SOB (13 Sep 2022 08:57)    HPI: 61 y/o M w/ PMH of COPD, DM2, p/w SOB. SOB has been there for several weeks, however, has gotten worse over the last 2 days. SOB feels like wheezing, and is exacerbated with exertion such as walking and relieved with rest. Denies cough, but does have some "spitting up". Denies runny nose / sore throat / fever / chills / nausea / vomiting / abdominal pain.    INTERVAL HPI/ OVERNIGHT EVENTS:  chart reviewed, Pt was seen and examined, confirmed history above, reports some improvement of breathing, + cough, no phlegm. No CP. Has chronic back pain takes, Vicodin.   Plan discussed      Vital Signs Last 24 Hrs  T(C): 36.5 (13 Sep 2022 09:06), Max: 36.8 (12 Sep 2022 19:35)  T(F): 97.7 (13 Sep 2022 09:06), Max: 98.3 (12 Sep 2022 19:35)  HR: 83 (13 Sep 2022 09:06) (68 - 83)  BP: 133/79 (13 Sep 2022 09:06) (125/68 - 145/80)  BP(mean): 91 (13 Sep 2022 04:36) (86 - 91)  RR: 18 (13 Sep 2022 09:06) (18 - 18)  SpO2: 92% (13 Sep 2022 09:06) (92% - 98%)    Parameters below as of 13 Sep 2022 09:06  Patient On (Oxygen Delivery Method): nasal cannula  O2 Flow (L/min): 2      REVIEW OF SYSTEMS:  All other review of systems is negative unless indicated above.      PHYSICAL EXAM:  General: Well developed; obese,  in no acute distress, on NC   Eyes: EOMI; conjunctiva and sclera clear  Head: Normocephalic; atraumatic  ENMT: No nasal discharge; airway clear  Neck: Supple; non tender; no masses  Respiratory: Decreased BS, mild wheezing b/l   Cardiovascular: Regular rate and rhythm. S1 and S2 Normal;   Gastrointestinal: Soft non-tender non-distended; Normal bowel sounds  Genitourinary: No  suprapubic  tenderness  Extremities: No pitting edema  Vascular: Peripheral pulses palpable 2+ bilaterally  Neurological: Alert and oriented x3, non focal   Musculoskeletal: Normal muscle tone, without deformities  Psychiatric: Cooperative and appropriate      Labs:                                     14.7   5.73  )-----------( 285      ( 13 Sep 2022 07:42 )             43.3     13 Sep 2022 07:42    136    |  102    |  22     ----------------------------<  362    3.8     |  27     |  0.87     Ca    9.1        13 Sep 2022 07:42  Mg     2.1       12 Sep 2022 23:38    TPro  6.6    /  Alb  3.4    /  TBili  0.6    /  DBili  x      /  AST  16     /  ALT  30     /  AlkPhos  60     13 Sep 2022 07:42  PT/INR - ( 13 Sep 2022 07:42 )   PT: 12.1 sec;   INR: 1.04 ratio    PTT - ( 13 Sep 2022 07:42 )  PTT:33.3 sec      POCT Blood Glucose.: 328 mg/dL (13 Sep 2022 07:40)    LIVER FUNCTIONS - ( 13 Sep 2022 07:42 )  Alb: 3.4 g/dL / Pro: 6.6 gm/dL / ALK PHOS: 60 U/L / ALT: 30 U/L / AST: 16 U/L / GGT: x               Color: Yellow / Appearance: Clear / S.025 / pH: x  Gluc: x / Ketone: Negative  / Bili: Negative / Urobili: Negative   Blood: x / Protein: Negative / Nitrite: Negative   Leuk Esterase: Negative / RBC: x / WBC x   Sq Epi: x / Non Sq Epi: x / Bacteria: x        MEDICATIONS  (STANDING):  ALBUTerol    90 MICROgram(s) HFA Inhaler 2 Puff(s) Inhalation every 4 hours  atorvastatin 40 milliGRAM(s) Oral at bedtime  budesonide 160 MICROgram(s)/formoterol 4.5 MICROgram(s) Inhaler 2 Puff(s) Inhalation two times a day  dextrose 5%. 1000 milliLiter(s) (100 mL/Hr) IV Continuous <Continuous>  dextrose 5%. 1000 milliLiter(s) (50 mL/Hr) IV Continuous <Continuous>  dextrose 50% Injectable 25 Gram(s) IV Push once  dextrose 50% Injectable 12.5 Gram(s) IV Push once  dextrose 50% Injectable 25 Gram(s) IV Push once  enoxaparin Injectable 40 milliGRAM(s) SubCutaneous every 12 hours  gabapentin 800 milliGRAM(s) Oral three times a day  glucagon  Injectable 1 milliGRAM(s) IntraMuscular once  guaiFENesin ER 1200 milliGRAM(s) Oral every 12 hours  influenza   Vaccine 0.5 milliLiter(s) IntraMuscular once  insulin glargine Injectable (LANTUS) 40 Unit(s) SubCutaneous every morning  insulin lispro (ADMELOG) corrective regimen sliding scale   SubCutaneous three times a day before meals  insulin lispro (ADMELOG) corrective regimen sliding scale   SubCutaneous at bedtime  losartan 100 milliGRAM(s) Oral daily  methylPREDNISolone sodium succinate Injectable 40 milliGRAM(s) IV Push every 8 hours  spironolactone 25 milliGRAM(s) Oral daily  tiotropium 18 MICROgram(s) Capsule 1 Capsule(s) Inhalation daily    MEDICATIONS  (PRN):  benzonatate 100 milliGRAM(s) Oral every 8 hours PRN Cough  dextrose Oral Gel 15 Gram(s) Oral once PRN Blood Glucose LESS THAN 70 milliGRAM(s)/deciliter  oxycodone    5 mG/acetaminophen 325 mG 2 Tablet(s) Oral every 6 hours PRN Moderate Pain (4 - 6)      RADIOLOGY & ADDITIONAL TESTS:      ACC: [id number] EXAM:  CT CHEST                        PROCEDURE DATE:  2022      INTERPRETATION:  HISTORY: 62-year-old man. Shortness of breath.  EXAMINATION: CT CHEST was performed without IV contrast.  COMPARISON: No CT chest comparison available.    FINDINGS:  AIRWAYS, LUNGS, PLEURA: Right middle lobe and bilateral lower lobe   bronchiolectasis. Lingular and bibasilar subsegmental scarring. No focal   consolidation. No pleural effusion.  MEDIASTINUM: Heart size normal. Coronary atherosclerosis. No pericardial   effusion. Thoracic aorta normal caliber.  No large mediastinal lymph   nodes.  IMAGED ABDOMEN: Laparoscopic gastric band in place.  SOFT TISSUES: Bilateral gynecomastia.  BONES: Unremarkable.      IMPRESSION:.  No focal consolidation.  Coronary atherosclerosis.

## 2022-09-13 NOTE — CONSULT NOTE ADULT - SUBJECTIVE AND OBJECTIVE BOX
CHIEF COMPLAINT: Patient is a 62y old  Male who presents with a chief complaint of SOB (13 Sep 2022 04:32)      HPI: HPI:  63 y/o M w/ PMH of COPD, DM2, p/w SOB. SOB has been there for several weeks, however, has gotten worse over the last 2 days. SOB feels like wheezing, and is exacerbated with exertion such as walking and relieved with rest. Denies cough, but does have some "spitting up". Denies runny nose / sore throat / fever / chills / nausea / vomiting / abdominal pain.    PSH: partial lung lobectomy    Social Hx: Tobacco - quit in 1999, etoh - occasional, drugs - denies     Family Hx: DM     (13 Sep 2022 04:32)      PMHx: PAST MEDICAL & SURGICAL HISTORY:  History of COPD      DM (diabetes mellitus)      Asthma      S/P partial lobectomy of lung            Soc Hx:     FAMILY HISTORY:  FH: diabetes mellitus        Allergies: Allergies    No Known Allergies    Intolerances    09/13/2022-The patient received furosemide 20 IV in the ER.          REVIEW OF SYSTEMS:    As above  No chest pain + shortness of breath  No lightheadeness or syncope  No leg swelling  No palpitations  No claudication-like symptoms    Vital Signs Last 24 Hrs  T(C): 36.5 (13 Sep 2022 05:15), Max: 36.8 (12 Sep 2022 19:35)  T(F): 97.7 (13 Sep 2022 05:15), Max: 98.3 (12 Sep 2022 19:35)  HR: 68 (13 Sep 2022 05:15) (68 - 75)  BP: 145/80 (13 Sep 2022 05:15) (125/68 - 145/80)  BP(mean): 91 (13 Sep 2022 04:36) (86 - 91)  RR: 18 (13 Sep 2022 05:15) (18 - 18)  SpO2: 97% (13 Sep 2022 05:15) (93% - 98%)    Parameters below as of 13 Sep 2022 05:15  Patient On (Oxygen Delivery Method): nasal cannula  O2 Flow (L/min): 2      I&O's Summary      CAPILLARY BLOOD GLUCOSE          PHYSICAL EXAM:   Patient in NAD  Neck: No JVD; Carotids:  2+ without bruits  Respiratory:  Clear to A&P  Cardiovascular: S1 and S2, regular rate and rhythm, no Murmurs, gallops or rubs  Gastrointestinal:  Soft, non-tender; BS positive  Extremities: No peripheral edema  Vascular: 2+ peripheral pulses  Neurological: A/O x 3, no focal deficits      MEDICATIONS:  MEDICATIONS  (STANDING):  ALBUTerol    90 MICROgram(s) HFA Inhaler 2 Puff(s) Inhalation every 4 hours  atorvastatin 40 milliGRAM(s) Oral at bedtime  budesonide 160 MICROgram(s)/formoterol 4.5 MICROgram(s) Inhaler 2 Puff(s) Inhalation two times a day  dextrose 5%. 1000 milliLiter(s) (100 mL/Hr) IV Continuous <Continuous>  dextrose 5%. 1000 milliLiter(s) (50 mL/Hr) IV Continuous <Continuous>  dextrose 50% Injectable 25 Gram(s) IV Push once  dextrose 50% Injectable 12.5 Gram(s) IV Push once  dextrose 50% Injectable 25 Gram(s) IV Push once  enoxaparin Injectable 40 milliGRAM(s) SubCutaneous every 24 hours  gabapentin 800 milliGRAM(s) Oral three times a day  glucagon  Injectable 1 milliGRAM(s) IntraMuscular once  influenza   Vaccine 0.5 milliLiter(s) IntraMuscular once  insulin glargine Injectable (LANTUS) 40 Unit(s) SubCutaneous every morning  insulin lispro (ADMELOG) corrective regimen sliding scale   SubCutaneous three times a day before meals  insulin lispro (ADMELOG) corrective regimen sliding scale   SubCutaneous at bedtime  levoFLOXacin IVPB 500 milliGRAM(s) IV Intermittent every 24 hours  losartan 100 milliGRAM(s) Oral daily  methylPREDNISolone sodium succinate Injectable 40 milliGRAM(s) IV Push every 8 hours  spironolactone 25 milliGRAM(s) Oral daily  tiotropium 18 MICROgram(s) Capsule 1 Capsule(s) Inhalation daily      LABS: All Labs Reviewed:  Blood Culture:   BNP Serum Pro-Brain Natriuretic Peptide: 46 pg/mL (09-12 @ 23:38)    CBC             WBC Count: 5.47 K/uL (09-12 @ 23:38)              Hemoglobin: 13.5 g/dL (09-12 @ 23:38)              Hematocrit: 40.7 % (09-12 @ 23:38)              Mean Cell Volume: 90.6 fl (09-12 @ 23:38)              Platelet Count - Automated: 266 K/uL (09-12 @ 23:38)                            Cardiac markers   Troponin I, High Sensitivity (09.12.22 @ 23:38) Troponin I, High Sensitivity Result: 6.67                                       Chems        Sodium, Serum: 139 mmol/L (09-12 @ 23:38)          Potassium, Serum: 3.6 mmol/L (09-12 @ 23:38)          Blood Urea Nitrogen, Serum: 21 mg/dL (09-12 @ 23:38)          Creatinine 0.98          Magnesium, Serum: 2.1 mg/dL (09-12 @ 23:38)          Protein Total, Serum: 6.1 gm/dL (09-12 @ 23:38)                  Calcium, Total Serum: 9.0 mg/dL (09-12 @ 23:38)                  Bilirubin Total, Serum: 0.3 mg/dL (09-12 @ 23:38)          Alanine Aminotransferase (ALT/SGPT): 26 U/L (09-12 @ 23:38)          Aspartate Aminotransferase (AST/SGOT): 9 U/L (09-12 @ 23:38)                 INR: 1.05 ratio (09-12 @ 23:38)             RADIOLOGY:< from: Xray Chest 1 View-PORTABLE IMMEDIATE (06.20.22 @ 13:36) >  On August 9, 2011 there were basilar pleural pulmonary findings left   greater than right.    Present film shows improvement with slight residual.    IMPRESSION: Slight residual bibasilar findings.    < end of copied text >      EKG:  NSR; mild first degree AV block; low voltage; LAD; possible old ASWI    Telemetry:  Sinus    ECHO:       CHIEF COMPLAINT: Patient is a 62y old  Male who presents with a chief complaint of SOB (13 Sep 2022 04:32)      HPI: HPI:  61 y/o M w/ PMH of COPD, DM2, p/w SOB. SOB has been there for several weeks, however, has gotten worse over the last 2 days. SOB feels like wheezing, and is exacerbated with exertion such as walking and relieved with rest. Denies cough, but does have some "spitting up". Denies runny nose / sore throat / fever / chills / nausea / vomiting / abdominal pain.    PSH: partial lung lobectomy    Social Hx: Tobacco - quit in 1999, etoh - occasional, drugs - denies     Family Hx: DM     (13 Sep 2022 04:32)      PMHx: PAST MEDICAL & SURGICAL HISTORY:  History of COPD      DM (diabetes mellitus)      Asthma      S/P partial lobectomy of lung            Soc Hx:     FAMILY HISTORY:  FH: diabetes mellitus        Allergies: Allergies    No Known Allergies    Intolerances    09/13/2022-The patient received furosemide 20 IV in the ER.          REVIEW OF SYSTEMS:    As above  No chest pain + shortness of breath  No lightheadeness or syncope  No leg swelling  No palpitations  No claudication-like symptoms    Vital Signs Last 24 Hrs  T(C): 36.5 (13 Sep 2022 05:15), Max: 36.8 (12 Sep 2022 19:35)  T(F): 97.7 (13 Sep 2022 05:15), Max: 98.3 (12 Sep 2022 19:35)  HR: 68 (13 Sep 2022 05:15) (68 - 75)  BP: 145/80 (13 Sep 2022 05:15) (125/68 - 145/80)  BP(mean): 91 (13 Sep 2022 04:36) (86 - 91)  RR: 18 (13 Sep 2022 05:15) (18 - 18)  SpO2: 97% (13 Sep 2022 05:15) (93% - 98%)    Parameters below as of 13 Sep 2022 05:15  Patient On (Oxygen Delivery Method): nasal cannula  O2 Flow (L/min): 2      I&O's Summary      CAPILLARY BLOOD GLUCOSE          PHYSICAL EXAM:   Patient in NAD  Neck: No JVD; Carotids:  2+ without bruits  Respiratory:  + expiratory wheeze  Cardiovascular: S1 and S2, regular rate and rhythm, no Murmurs, gallops or rubs  Gastrointestinal:  Soft, non-tender; BS positive  Extremities: No peripheral edema  Vascular: 2+ peripheral pulses  Neurological: A/O x 3, no focal deficits      MEDICATIONS:  MEDICATIONS  (STANDING):  ALBUTerol    90 MICROgram(s) HFA Inhaler 2 Puff(s) Inhalation every 4 hours  atorvastatin 40 milliGRAM(s) Oral at bedtime  budesonide 160 MICROgram(s)/formoterol 4.5 MICROgram(s) Inhaler 2 Puff(s) Inhalation two times a day  dextrose 5%. 1000 milliLiter(s) (100 mL/Hr) IV Continuous <Continuous>  dextrose 5%. 1000 milliLiter(s) (50 mL/Hr) IV Continuous <Continuous>  dextrose 50% Injectable 25 Gram(s) IV Push once  dextrose 50% Injectable 12.5 Gram(s) IV Push once  dextrose 50% Injectable 25 Gram(s) IV Push once  enoxaparin Injectable 40 milliGRAM(s) SubCutaneous every 24 hours  gabapentin 800 milliGRAM(s) Oral three times a day  glucagon  Injectable 1 milliGRAM(s) IntraMuscular once  influenza   Vaccine 0.5 milliLiter(s) IntraMuscular once  insulin glargine Injectable (LANTUS) 40 Unit(s) SubCutaneous every morning  insulin lispro (ADMELOG) corrective regimen sliding scale   SubCutaneous three times a day before meals  insulin lispro (ADMELOG) corrective regimen sliding scale   SubCutaneous at bedtime  levoFLOXacin IVPB 500 milliGRAM(s) IV Intermittent every 24 hours  losartan 100 milliGRAM(s) Oral daily  methylPREDNISolone sodium succinate Injectable 40 milliGRAM(s) IV Push every 8 hours  spironolactone 25 milliGRAM(s) Oral daily  tiotropium 18 MICROgram(s) Capsule 1 Capsule(s) Inhalation daily      LABS: All Labs Reviewed:  Blood Culture:   BNP Serum Pro-Brain Natriuretic Peptide: 46 pg/mL (09-12 @ 23:38)    CBC             WBC Count: 5.47 K/uL (09-12 @ 23:38)              Hemoglobin: 13.5 g/dL (09-12 @ 23:38)              Hematocrit: 40.7 % (09-12 @ 23:38)              Mean Cell Volume: 90.6 fl (09-12 @ 23:38)              Platelet Count - Automated: 266 K/uL (09-12 @ 23:38)                            Cardiac markers   Troponin I, High Sensitivity (09.12.22 @ 23:38) Troponin I, High Sensitivity Result: 6.67                                       Chems        Sodium, Serum: 139 mmol/L (09-12 @ 23:38)          Potassium, Serum: 3.6 mmol/L (09-12 @ 23:38)          Blood Urea Nitrogen, Serum: 21 mg/dL (09-12 @ 23:38)          Creatinine 0.98          Magnesium, Serum: 2.1 mg/dL (09-12 @ 23:38)          Protein Total, Serum: 6.1 gm/dL (09-12 @ 23:38)                  Calcium, Total Serum: 9.0 mg/dL (09-12 @ 23:38)                  Bilirubin Total, Serum: 0.3 mg/dL (09-12 @ 23:38)          Alanine Aminotransferase (ALT/SGPT): 26 U/L (09-12 @ 23:38)          Aspartate Aminotransferase (AST/SGOT): 9 U/L (09-12 @ 23:38)                 INR: 1.05 ratio (09-12 @ 23:38)             RADIOLOGY:< from: Xray Chest 1 View-PORTABLE IMMEDIATE (06.20.22 @ 13:36) >  On August 9, 2011 there were basilar pleural pulmonary findings left   greater than right.    Present film shows improvement with slight residual.    IMPRESSION: Slight residual bibasilar findings.    < end of copied text >      EKG:  NSR; mild first degree AV block; low voltage; LAD; possible old ASWI    Telemetry:  Sinus    ECHO:       CHIEF COMPLAINT: Patient is a 62y old  Male who presents with a chief complaint of SOB (13 Sep 2022 04:32)      HPI: HPI:  61 y/o M w/ PMH of COPD, DM2, p/w SOB. SOB has been there for several weeks, however, has gotten worse over the last 2 days. SOB feels like wheezing, and is exacerbated with exertion such as walking and relieved with rest. Denies cough, but does have some "spitting up". Denies runny nose / sore throat / fever / chills / nausea / vomiting / abdominal pain.    PSH: partial lung lobectomy    Social Hx: Tobacco - quit in 1999, etoh - occasional, drugs - denies     Family Hx: DM     (13 Sep 2022 04:32)      PMHx: PAST MEDICAL & SURGICAL HISTORY:  History of COPD      DM (diabetes mellitus)      Asthma      S/P partial lobectomy of lung            Soc Hx:     FAMILY HISTORY:  FH: diabetes mellitus        Allergies: Allergies    No Known Allergies    Intolerances    09/13/2022-The patient received furosemide 20 IV in the ER.          REVIEW OF SYSTEMS:    As above  No chest pain + shortness of breath  No lightheadeness or syncope  No leg swelling  No palpitations  No claudication-like symptoms    Vital Signs Last 24 Hrs  T(C): 36.5 (13 Sep 2022 05:15), Max: 36.8 (12 Sep 2022 19:35)  T(F): 97.7 (13 Sep 2022 05:15), Max: 98.3 (12 Sep 2022 19:35)  HR: 68 (13 Sep 2022 05:15) (68 - 75)  BP: 145/80 (13 Sep 2022 05:15) (125/68 - 145/80)  BP(mean): 91 (13 Sep 2022 04:36) (86 - 91)  RR: 18 (13 Sep 2022 05:15) (18 - 18)  SpO2: 97% (13 Sep 2022 05:15) (93% - 98%)    Parameters below as of 13 Sep 2022 05:15  Patient On (Oxygen Delivery Method): nasal cannula  O2 Flow (L/min): 2      I&O's Summary      CAPILLARY BLOOD GLUCOSE          PHYSICAL EXAM:   Patient in NAD  Neck: No JVD; Carotids:  2+ without bruits  Respiratory:  + expiratory wheeze  Cardiovascular: S1 and S2, regular rate and rhythm, no Murmurs, gallops or rubs  Gastrointestinal:  Soft, non-tender; BS positive  Extremities: No peripheral edema  Vascular: 2+ peripheral pulses  Neurological: A/O x 3, no focal deficits      MEDICATIONS:  MEDICATIONS  (STANDING):  ALBUTerol    90 MICROgram(s) HFA Inhaler 2 Puff(s) Inhalation every 4 hours  atorvastatin 40 milliGRAM(s) Oral at bedtime  budesonide 160 MICROgram(s)/formoterol 4.5 MICROgram(s) Inhaler 2 Puff(s) Inhalation two times a day  dextrose 5%. 1000 milliLiter(s) (100 mL/Hr) IV Continuous <Continuous>  dextrose 5%. 1000 milliLiter(s) (50 mL/Hr) IV Continuous <Continuous>  dextrose 50% Injectable 25 Gram(s) IV Push once  dextrose 50% Injectable 12.5 Gram(s) IV Push once  dextrose 50% Injectable 25 Gram(s) IV Push once  enoxaparin Injectable 40 milliGRAM(s) SubCutaneous every 24 hours  gabapentin 800 milliGRAM(s) Oral three times a day  glucagon  Injectable 1 milliGRAM(s) IntraMuscular once  influenza   Vaccine 0.5 milliLiter(s) IntraMuscular once  insulin glargine Injectable (LANTUS) 40 Unit(s) SubCutaneous every morning  insulin lispro (ADMELOG) corrective regimen sliding scale   SubCutaneous three times a day before meals  insulin lispro (ADMELOG) corrective regimen sliding scale   SubCutaneous at bedtime  levoFLOXacin IVPB 500 milliGRAM(s) IV Intermittent every 24 hours  losartan 100 milliGRAM(s) Oral daily  methylPREDNISolone sodium succinate Injectable 40 milliGRAM(s) IV Push every 8 hours  spironolactone 25 milliGRAM(s) Oral daily  tiotropium 18 MICROgram(s) Capsule 1 Capsule(s) Inhalation daily      LABS: All Labs Reviewed:  Blood Culture:   BNP Serum Pro-Brain Natriuretic Peptide: 46 pg/mL (09-12 @ 23:38)    CBC             WBC Count: 5.47 K/uL (09-12 @ 23:38)              Hemoglobin: 13.5 g/dL (09-12 @ 23:38)              Hematocrit: 40.7 % (09-12 @ 23:38)              Mean Cell Volume: 90.6 fl (09-12 @ 23:38)              Platelet Count - Automated: 266 K/uL (09-12 @ 23:38)                            Cardiac markers   Troponin I, High Sensitivity (09.12.22 @ 23:38) Troponin I, High Sensitivity Result: 6.67                                       Chems        Sodium, Serum: 139 mmol/L (09-12 @ 23:38)          Potassium, Serum: 3.6 mmol/L (09-12 @ 23:38)          Blood Urea Nitrogen, Serum: 21 mg/dL (09-12 @ 23:38)          Creatinine 0.98          Magnesium, Serum: 2.1 mg/dL (09-12 @ 23:38)          Protein Total, Serum: 6.1 gm/dL (09-12 @ 23:38)                  Calcium, Total Serum: 9.0 mg/dL (09-12 @ 23:38)                  Bilirubin Total, Serum: 0.3 mg/dL (09-12 @ 23:38)          Alanine Aminotransferase (ALT/SGPT): 26 U/L (09-12 @ 23:38)          Aspartate Aminotransferase (AST/SGOT): 9 U/L (09-12 @ 23:38)                 INR: 1.05 ratio (09-12 @ 23:38)             RADIOLOGY:< from: Xray Chest 1 View-PORTABLE IMMEDIATE (06.20.22 @ 13:36) >  On August 9, 2011 there were basilar pleural pulmonary findings left   greater than right.    Present film shows improvement with slight residual.    IMPRESSION: Slight residual bibasilar findings.    < end of copied text >      EKG:  NSR; mild first degree AV block; low voltage; LAD; possible old ASWI        ECHO:

## 2022-09-13 NOTE — H&P ADULT - HISTORY OF PRESENT ILLNESS
63 y/o M w/ PMH of COPD, DM2, p/w SOB. SOB has been there for several weeks, however, has gotten worse over the last 2 days. SOB feels like wheezing, and is exacerbated with exertion such as walking and relieved with rest. Denies cough, but does have some "spitting up". Denies runny nose / sore throat / fever / chills / nausea / vomiting / abdominal pain.    PSH: partial lung lobectomy    Social Hx: Tobacco - quit in 1999, etoh - occasional, drugs - denies     Family Hx: DM

## 2022-09-13 NOTE — CONSULT NOTE ADULT - PROBLEM SELECTOR PROBLEM 4
Spoke with Jacky Boykin from hospice house family is agreeable to send patient to hospice house for care.  [address] [facility]  01/03/21 [id number] ARIE (obstructive sleep apnea)

## 2022-09-13 NOTE — H&P ADULT - ASSESSMENT
63 y/o M w/ PMH of COPD, DM2, p/w SOB    *Dyspnea 2/2 COPD Exacerbation vs acute on chronic diastolic CHF?  -Solumedrol  -Albuterol / Spiriva  -Azithromycin  -Pulm consult  -O2 supplementation   -Pulse ox monitoring   -F/u final CXR report   -LLE swelling > RLE swelling   -U/S of LEs  -Echo - 6/2022   -Will give one dose of IV lasix for now and cardio consult     *DM2  -Humalog ISS + Basal insulin  -Diabetic diet     *Medication reconciliation  -States that he doesn't recall medications, but meds haven't changed since last admission    *Advance Directives  -Patient denies having advance care directives in place     *DVT ppx  -Heparin SubQ 61 y/o M w/ PMH of COPD, DM2, p/w SOB    *Dyspnea 2/2 COPD Exacerbation vs URI vs  acute on chronic diastolic CHF?  -Solumedrol  -Albuterol / Spiriva  -C/w Levofloxacin   -COVID / RVP negative   -Pulm consult  -O2 supplementation   -Pulse ox monitoring   -F/u final CXR report   -LLE swelling > RLE swelling   -U/S of LEs  -Echo - 6/2022   -EKG - Qtc prolonged (avoid Qt prolonging meds) + 1st deg AV block   -Will give one dose of IV lasix for now   -Consult cardio   -On ARB, but will hold off on starting BB for now given COPD exacerbation     *DM2  -Humalog ISS + Basal insulin  -Diabetic diet     *Medication reconciliation  -States that he doesn't recall medications, but meds haven't changed since last admission    *Advance Directives  -Patient denies having advance care directives in place     *DVT ppx  -Lovenox

## 2022-09-13 NOTE — PROGRESS NOTE ADULT - ASSESSMENT
63 y/o M w/ PMH of COPD, DM2 admitted for:     * Acute  Hypoxic Respiratory failure due to Acute COPD Exacerbation vs URI vs  acute on chronic diastolic CHF?  - RVO and COVID neg   - CTY chest no PNA, no PVC or effusions, + bronchiectasis   - D/c  IV Abxs   - C/w Solumedrol 40mg Q8H  - C/w Albuterol / Spiriva/Symbicort  - Supplement O2 via NC, wean off as tolerated   - LE doppler neg for DVT  - D-dimers neg   - BNP wnl, doubt CHF   - D/w Dr Johns     *DM2  -Humalog ISS + Basal insulin  -Diabetic diet   Vheck HB A1c     HTN  C/w Losartan and Spironolactone  Hold Chlorthalidone     *Advance Directives  -Patient denies having advance care directives in place     *DVT ppx  -Lovenox

## 2022-09-13 NOTE — H&P ADULT - RESPIRATORY
no respiratory distress/no use of accessory muscles/no subcutaneous emphysema/airway patent/breath sounds equal/good air movement/wheezes/rhonchi

## 2022-09-13 NOTE — CONSULT NOTE ADULT - SUBJECTIVE AND OBJECTIVE BOX
HPI:  63 y/o M w/ PMH of COPD, DM2, p/w SOB. SOB has been there for several weeks, however, has gotten worse over the last 2 days. SOB feels like wheezing, and is exacerbated with exertion such as walking and relieved with rest. Denies cough, but does have some "spitting up". Denies runny nose / sore throat / fever / chills / nausea / vomiting / abdominal pain.    : History as above. Seen in office yesterday. Had audible wheezing w respiratory distress. Sent to ER.    PSH: partial lung lobectomy    Social Hx: Tobacco - quit in , etoh - occasional, drugs - denies     Family Hx: DM     (13 Sep 2022 04:32)      PAST MEDICAL & SURGICAL HISTORY:  History of COPD      DM (diabetes mellitus)      Asthma      S/P partial lobectomy of lung          MEDICATIONS  (STANDING):  ALBUTerol    90 MICROgram(s) HFA Inhaler 2 Puff(s) Inhalation every 4 hours  atorvastatin 40 milliGRAM(s) Oral at bedtime  budesonide 160 MICROgram(s)/formoterol 4.5 MICROgram(s) Inhaler 2 Puff(s) Inhalation two times a day  dextrose 5%. 1000 milliLiter(s) (100 mL/Hr) IV Continuous <Continuous>  dextrose 5%. 1000 milliLiter(s) (50 mL/Hr) IV Continuous <Continuous>  dextrose 50% Injectable 25 Gram(s) IV Push once  dextrose 50% Injectable 12.5 Gram(s) IV Push once  dextrose 50% Injectable 25 Gram(s) IV Push once  enoxaparin Injectable 40 milliGRAM(s) SubCutaneous every 24 hours  gabapentin 800 milliGRAM(s) Oral three times a day  glucagon  Injectable 1 milliGRAM(s) IntraMuscular once  influenza   Vaccine 0.5 milliLiter(s) IntraMuscular once  insulin glargine Injectable (LANTUS) 40 Unit(s) SubCutaneous every morning  insulin lispro (ADMELOG) corrective regimen sliding scale   SubCutaneous three times a day before meals  insulin lispro (ADMELOG) corrective regimen sliding scale   SubCutaneous at bedtime  losartan 100 milliGRAM(s) Oral daily  methylPREDNISolone sodium succinate Injectable 40 milliGRAM(s) IV Push every 8 hours  spironolactone 25 milliGRAM(s) Oral daily  tiotropium 18 MICROgram(s) Capsule 1 Capsule(s) Inhalation daily    MEDICATIONS  (PRN):  dextrose Oral Gel 15 Gram(s) Oral once PRN Blood Glucose LESS THAN 70 milliGRAM(s)/deciliter  oxycodone    5 mG/acetaminophen 325 mG 2 Tablet(s) Oral every 6 hours PRN Moderate Pain (4 - 6)      Allergies    No Known Allergies    Intolerances        SOCIAL HISTORY: Denies tobacco, etoh abuse or illicit drug use    FAMILY HISTORY:  FH: diabetes mellitus        Vital Signs Last 24 Hrs  T(C): 36.5 (13 Sep 2022 05:15), Max: 36.8 (12 Sep 2022 19:35)  T(F): 97.7 (13 Sep 2022 05:15), Max: 98.3 (12 Sep 2022 19:35)  HR: 68 (13 Sep 2022 05:15) (68 - 75)  BP: 145/80 (13 Sep 2022 05:15) (125/68 - 145/80)  BP(mean): 91 (13 Sep 2022 04:36) (86 - 91)  RR: 18 (13 Sep 2022 05:15) (18 - 18)  SpO2: 97% (13 Sep 2022 05:15) (93% - 98%)    Parameters below as of 13 Sep 2022 05:15  Patient On (Oxygen Delivery Method): nasal cannula  O2 Flow (L/min): 2      REVIEW OF SYSTEMS:    CONSTITUTIONAL:  As per HPI.  SKIN: no rashes  HEENT:  Eyes:  No diplopia or blurred vision. ENT:  No earache, sore throat or runny nose.  CARDIOVASCULAR:  No pressure, squeezing, tightness, heaviness or aching about the chest, neck, axilla or epigastrium.  RESPIRATORY:  sob  GASTROINTESTINAL:  No nausea, vomiting or diarrhea.  GENITOURINARY:  No dysuria, frequency or urgency.  MUSCULOSKELETAL:  As per HPI.  SKIN:  No change in skin, hair or nails.  NEUROLOGIC:  No paresthesias, fasciculations, seizures or weakness.  PSYCHIATRIC:  No disorder of thought or mood.  ENDOCRINE:  No heat or cold intolerance, polyuria or polydipsia.  HEMATOLOGICAL:  No easy bruising or bleedings:  .     PHYSICAL EXAMINATION:    GENERAL APPEARANCE:  Pt. is not currently dyspneic, in no distress. Pt. is alert, oriented, and pleasant.  HEENT:  Pupils are normal and react normally. No icterus. Mucous membranes well colored.  NECK:  Supple. No lymphadenopathy. Jugular venous pressure not elevated. Carotids equal.   HEART:   S1S2 reg  CHEST:  scattered exp ronchi  ABDOMEN:  Soft and nontender. Obese  EXTREMITIES:  There is no cyanosis, clubbing or edema.   SKIN:  No rash or significant lesions are noted.    LABS:                        14.7   5.73  )-----------( 285      ( 13 Sep 2022 07:42 )             43.3         136  |  102  |  22  ----------------------------<  362<H>  3.8   |  27  |  0.87    Ca    9.1      13 Sep 2022 07:42  Mg     2.1         TPro  6.6  /  Alb  3.4  /  TBili  0.6  /  DBili  x   /  AST  16  /  ALT  30  /  AlkPhos  60      LIVER FUNCTIONS - ( 13 Sep 2022 07:42 )  Alb: 3.4 g/dL / Pro: 6.6 gm/dL / ALK PHOS: 60 U/L / ALT: 30 U/L / AST: 16 U/L / GGT: x           PT/INR - ( 13 Sep 2022 07:42 )   PT: 12.1 sec;   INR: 1.04 ratio         PTT - ( 13 Sep 2022 07:42 )  PTT:33.3 sec      Urinalysis Basic - ( 13 Sep 2022 00:00 )    Color: Yellow / Appearance: Clear / S.025 / pH: x  Gluc: x / Ketone: Negative  / Bili: Negative / Urobili: Negative   Blood: x / Protein: Negative / Nitrite: Negative   Leuk Esterase: Negative / RBC: x / WBC x   Sq Epi: x / Non Sq Epi: x / Bacteria: x          RADIOLOGY & ADDITIONAL STUDIES:

## 2022-09-13 NOTE — PATIENT PROFILE ADULT - FALL HARM RISK - HARM RISK INTERVENTIONS

## 2022-09-13 NOTE — CONSULT NOTE ADULT - ASSESSMENT
62 year old man with the above PMH including COPD who is admitted with progressive shortness of breath/dyspnea on exertion.  No chest pain.  Ekg is without ischemic changes.  Both BNP and troponin are normal.  His shortness of breath is most likely related to his known COPD.  I will however order a TTE to evaluate LV systolic function.
- feeling better today  - cont O2  - on symbicort/spiriva  - cxr shows no infiltrates  - cont steroids. BGM's elevated. Cont coverage.  - for CT scan chest  - has hx ARIE but stopped wearing CPAP 2 years ago.  - agree with d/c lasix  - dvt proph

## 2022-09-13 NOTE — PATIENT PROFILE ADULT - FUNCTIONAL ASSESSMENT - DAILY ACTIVITY 6.
S:     Melissa Waldron is a 30 year old  at 33w4d EDC 2021 for routine return OB visit.    No complaints. Denies cramping, bleeding, leaking fluid.     O:   Visit Vitals  /67   Pulse 93   Wt 82.1 kg (180 lb 14.2 oz)   LMP 10/04/2020 (Exact Date)   SpO2 100%   BMI 31.05 kg/m²       SEE ACOG FLOWSHEET    ASSESSMENT/PLAN:    1. Routine Care  -continue PNV  -PTL and kick count precautions d/w patient  -1hr GTT nml  -quad screen nml  -anatomy scan WNL  -IDPH tubal consent signed   -Tdap completed  - sched growth scan     2. H/o c/s  -plan for ERCS with b/l salpingectomy at 39 weeks     3. Anemia  -Hb 9.6      NIDIA 2 weeks.    Patient Active Problem List    Diagnosis Date Noted   • Anemia during pregnancy in third trimester 2021     Priority: Low     Overview Note:     -oral iron     • History of  delivery, currently pregnant 12/10/2020     Priority: Low   • History of spinal surgery 12/10/2020     Priority: Low          4 = No assist / stand by assistance

## 2022-09-14 LAB
ANION GAP SERPL CALC-SCNC: 5 MMOL/L — SIGNIFICANT CHANGE UP (ref 5–17)
BUN SERPL-MCNC: 25 MG/DL — HIGH (ref 7–23)
CALCIUM SERPL-MCNC: 9 MG/DL — SIGNIFICANT CHANGE UP (ref 8.5–10.1)
CHLORIDE SERPL-SCNC: 101 MMOL/L — SIGNIFICANT CHANGE UP (ref 96–108)
CO2 SERPL-SCNC: 29 MMOL/L — SIGNIFICANT CHANGE UP (ref 22–31)
CREAT SERPL-MCNC: 0.95 MG/DL — SIGNIFICANT CHANGE UP (ref 0.5–1.3)
EGFR: 89 ML/MIN/1.73M2 — SIGNIFICANT CHANGE UP
GLUCOSE SERPL-MCNC: 380 MG/DL — HIGH (ref 70–99)
POTASSIUM SERPL-MCNC: 4.5 MMOL/L — SIGNIFICANT CHANGE UP (ref 3.5–5.3)
POTASSIUM SERPL-SCNC: 4.5 MMOL/L — SIGNIFICANT CHANGE UP (ref 3.5–5.3)
SODIUM SERPL-SCNC: 135 MMOL/L — SIGNIFICANT CHANGE UP (ref 135–145)

## 2022-09-14 PROCEDURE — 99233 SBSQ HOSP IP/OBS HIGH 50: CPT

## 2022-09-14 PROCEDURE — 93010 ELECTROCARDIOGRAM REPORT: CPT

## 2022-09-14 RX ORDER — INSULIN LISPRO 100/ML
3 VIAL (ML) SUBCUTANEOUS ONCE
Refills: 0 | Status: COMPLETED | OUTPATIENT
Start: 2022-09-14 | End: 2022-09-14

## 2022-09-14 RX ORDER — INSULIN GLARGINE 100 [IU]/ML
50 INJECTION, SOLUTION SUBCUTANEOUS EVERY MORNING
Refills: 0 | Status: DISCONTINUED | OUTPATIENT
Start: 2022-09-15 | End: 2022-09-17

## 2022-09-14 RX ORDER — INSULIN LISPRO 100/ML
12 VIAL (ML) SUBCUTANEOUS
Refills: 0 | Status: DISCONTINUED | OUTPATIENT
Start: 2022-09-14 | End: 2022-09-17

## 2022-09-14 RX ADMIN — Medication 5: at 13:27

## 2022-09-14 RX ADMIN — Medication 8 UNIT(S): at 13:28

## 2022-09-14 RX ADMIN — ENOXAPARIN SODIUM 40 MILLIGRAM(S): 100 INJECTION SUBCUTANEOUS at 11:18

## 2022-09-14 RX ADMIN — GABAPENTIN 800 MILLIGRAM(S): 400 CAPSULE ORAL at 13:26

## 2022-09-14 RX ADMIN — Medication 5: at 17:29

## 2022-09-14 RX ADMIN — INSULIN GLARGINE 40 UNIT(S): 100 INJECTION, SOLUTION SUBCUTANEOUS at 08:33

## 2022-09-14 RX ADMIN — BUDESONIDE AND FORMOTEROL FUMARATE DIHYDRATE 2 PUFF(S): 160; 4.5 AEROSOL RESPIRATORY (INHALATION) at 08:45

## 2022-09-14 RX ADMIN — OXYCODONE AND ACETAMINOPHEN 2 TABLET(S): 5; 325 TABLET ORAL at 12:20

## 2022-09-14 RX ADMIN — ALBUTEROL 2 PUFF(S): 90 AEROSOL, METERED ORAL at 16:34

## 2022-09-14 RX ADMIN — ALBUTEROL 2 PUFF(S): 90 AEROSOL, METERED ORAL at 23:57

## 2022-09-14 RX ADMIN — ENOXAPARIN SODIUM 40 MILLIGRAM(S): 100 INJECTION SUBCUTANEOUS at 21:48

## 2022-09-14 RX ADMIN — OXYCODONE AND ACETAMINOPHEN 2 TABLET(S): 5; 325 TABLET ORAL at 11:20

## 2022-09-14 RX ADMIN — Medication 40 MILLIGRAM(S): at 13:27

## 2022-09-14 RX ADMIN — Medication 1200 MILLIGRAM(S): at 21:48

## 2022-09-14 RX ADMIN — GABAPENTIN 800 MILLIGRAM(S): 400 CAPSULE ORAL at 21:48

## 2022-09-14 RX ADMIN — LOSARTAN POTASSIUM 100 MILLIGRAM(S): 100 TABLET, FILM COATED ORAL at 11:17

## 2022-09-14 RX ADMIN — GABAPENTIN 800 MILLIGRAM(S): 400 CAPSULE ORAL at 05:31

## 2022-09-14 RX ADMIN — OXYCODONE AND ACETAMINOPHEN 2 TABLET(S): 5; 325 TABLET ORAL at 21:49

## 2022-09-14 RX ADMIN — ATORVASTATIN CALCIUM 40 MILLIGRAM(S): 80 TABLET, FILM COATED ORAL at 21:47

## 2022-09-14 RX ADMIN — Medication 3 UNIT(S): at 23:15

## 2022-09-14 RX ADMIN — ALBUTEROL 2 PUFF(S): 90 AEROSOL, METERED ORAL at 13:03

## 2022-09-14 RX ADMIN — ALBUTEROL 2 PUFF(S): 90 AEROSOL, METERED ORAL at 20:25

## 2022-09-14 RX ADMIN — BUDESONIDE AND FORMOTEROL FUMARATE DIHYDRATE 2 PUFF(S): 160; 4.5 AEROSOL RESPIRATORY (INHALATION) at 20:25

## 2022-09-14 RX ADMIN — ALBUTEROL 2 PUFF(S): 90 AEROSOL, METERED ORAL at 08:44

## 2022-09-14 RX ADMIN — Medication 1200 MILLIGRAM(S): at 11:17

## 2022-09-14 RX ADMIN — Medication 40 MILLIGRAM(S): at 05:31

## 2022-09-14 RX ADMIN — TIOTROPIUM BROMIDE 1 CAPSULE(S): 18 CAPSULE ORAL; RESPIRATORY (INHALATION) at 08:45

## 2022-09-14 RX ADMIN — Medication 4: at 22:00

## 2022-09-14 RX ADMIN — Medication 4: at 08:34

## 2022-09-14 RX ADMIN — Medication 40 MILLIGRAM(S): at 21:48

## 2022-09-14 RX ADMIN — SPIRONOLACTONE 25 MILLIGRAM(S): 25 TABLET, FILM COATED ORAL at 11:17

## 2022-09-14 RX ADMIN — Medication 8 UNIT(S): at 17:29

## 2022-09-14 NOTE — PROGRESS NOTE ADULT - SUBJECTIVE AND OBJECTIVE BOX
CC: SOB (13 Sep 2022 08:57)    HPI: 63 y/o M w/ PMH of COPD, DM2, p/w SOB. SOB has been there for several weeks, however, has gotten worse over the last 2 days. SOB feels like wheezing, and is exacerbated with exertion such as walking and relieved with rest. Denies cough, but does have some "spitting up". Denies runny nose / sore throat / fever / chills / nausea / vomiting / abdominal pain.    INTERVAL HPI/ OVERNIGHT EVENTS:  chart reviewed, Pt was seen and examined, confirmed history above, reports some improvement of breathing, + cough, no phlegm. No CP. Has chronic back pain takes, Vicodin.   Plan discussed      Vital Signs Last 24 Hrs  T(C): 36.5 (14 Sep 2022 09:05), Max: 36.7 (13 Sep 2022 15:49)  T(F): 97.7 (14 Sep 2022 09:05), Max: 98.1 (13 Sep 2022 15:49)  HR: 93 (14 Sep 2022 09:05) (80 - 96)  BP: 136/67 (14 Sep 2022 09:05) (126/73 - 136/67)  RR: 18 (14 Sep 2022 09:05) (18 - 19)  SpO2: 93% (14 Sep 2022 09:05) (93% - 97%)    Parameters below as of 14 Sep 2022 09:05  Patient On (Oxygen Delivery Method): nasal cannula  O2 Flow (L/min): 2      REVIEW OF SYSTEMS:  All other review of systems is negative unless indicated above.      PHYSICAL EXAM:  General: Well developed; obese,  in no acute distress, on NC   Eyes: EOMI; conjunctiva and sclera clear  Head: Normocephalic; atraumatic  ENMT: No nasal discharge; airway clear  Neck: Supple; non tender; no masses  Respiratory: Decreased BS, mild wheezing b/l   Cardiovascular: Regular rate and rhythm. S1 and S2 Normal;   Gastrointestinal: Soft non-tender non-distended; Normal bowel sounds  Genitourinary: No  suprapubic  tenderness  Extremities: No pitting edema  Vascular: Peripheral pulses palpable 2+ bilaterally  Neurological: Alert and oriented x3, non focal   Musculoskeletal: Normal muscle tone, without deformities  Psychiatric: Cooperative and appropriate      Labs:                                     14.7   5.73  )-----------( 285      ( 13 Sep 2022 07:42 )             43.3     13 Sep 2022 07:42    136    |  102    |  22     ----------------------------<  362    3.8     |  27     |  0.87     Ca    9.1        13 Sep 2022 07:42  Mg     2.1       12 Sep 2022 23:38    TPro  6.6    /  Alb  3.4    /  TBili  0.6    /  DBili  x      /  AST  16     /  ALT  30     /  AlkPhos  60     13 Sep 2022 07:42  PT/INR - ( 13 Sep 2022 07:42 )   PT: 12.1 sec;   INR: 1.04 ratio    PTT - ( 13 Sep 2022 07:42 )  PTT:33.3 sec      POCT Blood Glucose.: 328 mg/dL (13 Sep 2022 07:40)    LIVER FUNCTIONS - ( 13 Sep 2022 07:42 )  Alb: 3.4 g/dL / Pro: 6.6 gm/dL / ALK PHOS: 60 U/L / ALT: 30 U/L / AST: 16 U/L / GGT: x               Color: Yellow / Appearance: Clear / S.025 / pH: x  Gluc: x / Ketone: Negative  / Bili: Negative / Urobili: Negative   Blood: x / Protein: Negative / Nitrite: Negative   Leuk Esterase: Negative / RBC: x / WBC x   Sq Epi: x / Non Sq Epi: x / Bacteria: x        MEDICATIONS  (STANDING):  ALBUTerol    90 MICROgram(s) HFA Inhaler 2 Puff(s) Inhalation every 4 hours  atorvastatin 40 milliGRAM(s) Oral at bedtime  budesonide 160 MICROgram(s)/formoterol 4.5 MICROgram(s) Inhaler 2 Puff(s) Inhalation two times a day  dextrose 5%. 1000 milliLiter(s) (100 mL/Hr) IV Continuous <Continuous>  dextrose 5%. 1000 milliLiter(s) (50 mL/Hr) IV Continuous <Continuous>  dextrose 50% Injectable 25 Gram(s) IV Push once  dextrose 50% Injectable 12.5 Gram(s) IV Push once  dextrose 50% Injectable 25 Gram(s) IV Push once  enoxaparin Injectable 40 milliGRAM(s) SubCutaneous every 12 hours  gabapentin 800 milliGRAM(s) Oral three times a day  glucagon  Injectable 1 milliGRAM(s) IntraMuscular once  guaiFENesin ER 1200 milliGRAM(s) Oral every 12 hours  influenza   Vaccine 0.5 milliLiter(s) IntraMuscular once  insulin glargine Injectable (LANTUS) 40 Unit(s) SubCutaneous every morning  insulin lispro (ADMELOG) corrective regimen sliding scale   SubCutaneous three times a day before meals  insulin lispro (ADMELOG) corrective regimen sliding scale   SubCutaneous at bedtime  losartan 100 milliGRAM(s) Oral daily  methylPREDNISolone sodium succinate Injectable 40 milliGRAM(s) IV Push every 8 hours  spironolactone 25 milliGRAM(s) Oral daily  tiotropium 18 MICROgram(s) Capsule 1 Capsule(s) Inhalation daily    MEDICATIONS  (PRN):  benzonatate 100 milliGRAM(s) Oral every 8 hours PRN Cough  dextrose Oral Gel 15 Gram(s) Oral once PRN Blood Glucose LESS THAN 70 milliGRAM(s)/deciliter  oxycodone    5 mG/acetaminophen 325 mG 2 Tablet(s) Oral every 6 hours PRN Moderate Pain (4 - 6)      RADIOLOGY & ADDITIONAL TESTS:      ACC: [id number] EXAM:  CT CHEST                        PROCEDURE DATE:  2022      INTERPRETATION:  HISTORY: 62-year-old man. Shortness of breath.  EXAMINATION: CT CHEST was performed without IV contrast.  COMPARISON: No CT chest comparison available.    FINDINGS:  AIRWAYS, LUNGS, PLEURA: Right middle lobe and bilateral lower lobe   bronchiolectasis. Lingular and bibasilar subsegmental scarring. No focal   consolidation. No pleural effusion.  MEDIASTINUM: Heart size normal. Coronary atherosclerosis. No pericardial   effusion. Thoracic aorta normal caliber.  No large mediastinal lymph   nodes.  IMAGED ABDOMEN: Laparoscopic gastric band in place.  SOFT TISSUES: Bilateral gynecomastia.  BONES: Unremarkable.      IMPRESSION:.  No focal consolidation.  Coronary atherosclerosis.       CC: SOB (13 Sep 2022 08:57)    HPI: 63 y/o M w/ PMH of COPD, DM2, p/w SOB. SOB has been there for several weeks, however, has gotten worse over the last 2 days. SOB feels like wheezing, and is exacerbated with exertion such as walking and relieved with rest. Denies cough, but does have some "spitting up". Denies runny nose / sore throat / fever / chills / nausea / vomiting / abdominal pain.    INTERVAL HPI/ OVERNIGHT EVENTS:   Pt was seen and examined,  feels breathing and cough improving, has joint pain and HA,  chronic     Vital Signs Last 24 Hrs  T(C): 36.5 (14 Sep 2022 09:05), Max: 36.7 (13 Sep 2022 15:49)  T(F): 97.7 (14 Sep 2022 09:05), Max: 98.1 (13 Sep 2022 15:49)  HR: 93 (14 Sep 2022 09:05) (80 - 96)  BP: 136/67 (14 Sep 2022 09:05) (126/73 - 136/67)  RR: 18 (14 Sep 2022 09:05) (18 - 19)  SpO2: 93% (14 Sep 2022 09:05) (93% - 97%)    Parameters below as of 14 Sep 2022 09:05  Patient On (Oxygen Delivery Method): nasal cannula  O2 Flow (L/min): 2      REVIEW OF SYSTEMS:  All other review of systems is negative unless indicated above.      PHYSICAL EXAM:  General: Well developed; obese,  in no acute distress, on NC   Eyes: EOMI; conjunctiva and sclera clear  Head: Normocephalic; atraumatic  ENMT: No nasal discharge; airway clear  Neck: Supple; non tender; no masses  Respiratory: Decreased BS, mild wheezing b/l   Cardiovascular: Regular rate and rhythm. S1 and S2 Normal;   Gastrointestinal: Soft non-tender non-distended; Normal bowel sounds  Genitourinary: No  suprapubic  tenderness  Extremities: No pitting edema  Vascular: Peripheral pulses palpable 2+ bilaterally  Neurological: Alert and oriented x3, non focal   Musculoskeletal: Normal muscle tone, without deformities  Psychiatric: Cooperative and appropriate      Labs:                                   14.7   5.73  )-----------( 285      ( 13 Sep 2022 07:42 )             43.3     09-14    135  |  101  |  25<H>  ----------------------------<  380<H>  4.5   |  29  |  0.95    Ca    9.0      14 Sep 2022 07:42    TPro  6.6  /  Alb  3.4  /  TBili  0.6  /  DBili  x   /  AST  16  /  ALT  30  /  AlkPhos  60  -13    LIVER FUNCTIONS - ( 13 Sep 2022 07:42 )  Alb: 3.4 g/dL / Pro: 6.6 gm/dL / ALK PHOS: 60 U/L / ALT: 30 U/L / AST: 16 U/L / GGT: x           PT/INR - ( 13 Sep 2022 07:42 )   PT: 12.1 sec;   INR: 1.04 ratio         PTT - ( 13 Sep 2022 07:42 )  PTT:33.3 sec                            14.7   5.73  )-----------( 285      ( 13 Sep 2022 07:42 )             43.3     13 Sep 2022 07:42    136    |  102    |  22     ----------------------------<  362    3.8     |  27     |  0.87     Ca    9.1        13 Sep 2022 07:42  Mg     2.1       12 Sep 2022 23:38    TPro  6.6    /  Alb  3.4    /  TBili  0.6    /  DBili  x      /  AST  16     /  ALT  30     /  AlkPhos  60     13 Sep 2022 07:42  PT/INR - ( 13 Sep 2022 07:42 )   PT: 12.1 sec;   INR: 1.04 ratio    PTT - ( 13 Sep 2022 07:42 )  PTT:33.3 sec      POCT Blood Glucose.: 328 mg/dL (13 Sep 2022 07:40)    LIVER FUNCTIONS - ( 13 Sep 2022 07:42 )  Alb: 3.4 g/dL / Pro: 6.6 gm/dL / ALK PHOS: 60 U/L / ALT: 30 U/L / AST: 16 U/L / GGT: x               Color: Yellow / Appearance: Clear / S.025 / pH: x  Gluc: x / Ketone: Negative  / Bili: Negative / Urobili: Negative   Blood: x / Protein: Negative / Nitrite: Negative   Leuk Esterase: Negative / RBC: x / WBC x   Sq Epi: x / Non Sq Epi: x / Bacteria: x        MEDICATIONS  (STANDING):  ALBUTerol    90 MICROgram(s) HFA Inhaler 2 Puff(s) Inhalation every 4 hours  atorvastatin 40 milliGRAM(s) Oral at bedtime  budesonide 160 MICROgram(s)/formoterol 4.5 MICROgram(s) Inhaler 2 Puff(s) Inhalation two times a day  dextrose 5%. 1000 milliLiter(s) (100 mL/Hr) IV Continuous <Continuous>  dextrose 5%. 1000 milliLiter(s) (50 mL/Hr) IV Continuous <Continuous>  dextrose 50% Injectable 25 Gram(s) IV Push once  dextrose 50% Injectable 12.5 Gram(s) IV Push once  dextrose 50% Injectable 25 Gram(s) IV Push once  enoxaparin Injectable 40 milliGRAM(s) SubCutaneous every 12 hours  gabapentin 800 milliGRAM(s) Oral three times a day  glucagon  Injectable 1 milliGRAM(s) IntraMuscular once  guaiFENesin ER 1200 milliGRAM(s) Oral every 12 hours  influenza   Vaccine 0.5 milliLiter(s) IntraMuscular once  insulin glargine Injectable (LANTUS) 40 Unit(s) SubCutaneous every morning  insulin lispro (ADMELOG) corrective regimen sliding scale   SubCutaneous three times a day before meals  insulin lispro (ADMELOG) corrective regimen sliding scale   SubCutaneous at bedtime  losartan 100 milliGRAM(s) Oral daily  methylPREDNISolone sodium succinate Injectable 40 milliGRAM(s) IV Push every 8 hours  spironolactone 25 milliGRAM(s) Oral daily  tiotropium 18 MICROgram(s) Capsule 1 Capsule(s) Inhalation daily    MEDICATIONS  (PRN):  benzonatate 100 milliGRAM(s) Oral every 8 hours PRN Cough  dextrose Oral Gel 15 Gram(s) Oral once PRN Blood Glucose LESS THAN 70 milliGRAM(s)/deciliter  oxycodone    5 mG/acetaminophen 325 mG 2 Tablet(s) Oral every 6 hours PRN Moderate Pain (4 - 6)      RADIOLOGY & ADDITIONAL TESTS:      ACC: [id number] EXAM:  CT CHEST                        PROCEDURE DATE:  2022      INTERPRETATION:  HISTORY: 62-year-old man. Shortness of breath.  EXAMINATION: CT CHEST was performed without IV contrast.  COMPARISON: No CT chest comparison available.    FINDINGS:  AIRWAYS, LUNGS, PLEURA: Right middle lobe and bilateral lower lobe   bronchiolectasis. Lingular and bibasilar subsegmental scarring. No focal   consolidation. No pleural effusion.  MEDIASTINUM: Heart size normal. Coronary atherosclerosis. No pericardial   effusion. Thoracic aorta normal caliber.  No large mediastinal lymph   nodes.  IMAGED ABDOMEN: Laparoscopic gastric band in place.  SOFT TISSUES: Bilateral gynecomastia.  BONES: Unremarkable.      IMPRESSION:.  No focal consolidation.  Coronary atherosclerosis.

## 2022-09-14 NOTE — PROGRESS NOTE ADULT - ASSESSMENT
62 year old man with the above PMH including COPD who is admitted with progressive shortness of breath/dyspnea on exertion.  No chest pain.  Ekg is without ischemic changes.  Both BNP and troponin are normal.  His shortness of breath is most likely related to his known COPD.  I will however order a TTE to evaluate LV systolic function.    09/14/22:  Cardiac status stable.  Patient still has normal LV systolic function.  Most likely etiology of patient's symptoms is pulmonary.

## 2022-09-14 NOTE — PROVIDER CONTACT NOTE (OTHER) - ACTION/TREATMENT ORDERED:
additional 3u short term insulin to be ordered
give already ordered sliding scale coverage and recheck FS in around 1 hour

## 2022-09-14 NOTE — PROGRESS NOTE ADULT - ASSESSMENT
61 y/o M w/ PMH of COPD, DM2 admitted for:     * Acute  Hypoxic Respiratory failure due to Acute COPD Exacerbation vs URI vs  acute on chronic diastolic CHF?  - RVO and COVID neg   - CTY chest no PNA, no PVC or effusions, + bronchiectasis   - D/c  IV Abxs   - C/w Solumedrol 40mg Q8H  - C/w Albuterol / Spiriva/Symbicort  - Supplement O2 via NC, wean off as tolerated   - LE doppler neg for DVT  - D-dimers neg   - BNP wnl, doubt CHF   - D/w Dr Johns     *DM2  -Humalog ISS + Basal insulin  -Diabetic diet   Vheck HB A1c     HTN  C/w Losartan and Spironolactone  Hold Chlorthalidone     *Advance Directives  -Patient denies having advance care directives in place     *DVT ppx  -Lovenox  61 y/o M w/ PMH of COPD, DM2 admitted for:     * Acute  Hypoxic Respiratory failure due to Acute COPD Exacerbation   - RVP and COVID neg   - CTY chest no PNA, no PVC or effusions, + bronchiectasis   - OFF  IV Abxs   - C/w Solumedrol 40mg Q8H, REEVAL IN AM   - C/w Albuterol / Spiriva/Symbicort  - Supplement O2 via NC, wean off as tolerated   - LE doppler neg for DVT  - D-dimers neg   - BNP wnl, doubt CHF   - D/w Dr Johns     *DM2  -Humalog ISS + Basal insulin  -Diabetic diet   Check HB A1c     HTN  C/w Losartan and Spironolactone  Hold Chlorthalidone     *Advance Directives  -Patient denies having advance care directives in place     *DVT ppx  -Lovenox

## 2022-09-14 NOTE — PROGRESS NOTE ADULT - SUBJECTIVE AND OBJECTIVE BOX
Subjective:  feeling better  less sob    MEDICATIONS  (STANDING):  ALBUTerol    90 MICROgram(s) HFA Inhaler 2 Puff(s) Inhalation every 4 hours  atorvastatin 40 milliGRAM(s) Oral at bedtime  budesonide 160 MICROgram(s)/formoterol 4.5 MICROgram(s) Inhaler 2 Puff(s) Inhalation two times a day  dextrose 5%. 1000 milliLiter(s) (100 mL/Hr) IV Continuous <Continuous>  dextrose 5%. 1000 milliLiter(s) (50 mL/Hr) IV Continuous <Continuous>  dextrose 50% Injectable 25 Gram(s) IV Push once  dextrose 50% Injectable 12.5 Gram(s) IV Push once  dextrose 50% Injectable 25 Gram(s) IV Push once  enoxaparin Injectable 40 milliGRAM(s) SubCutaneous every 12 hours  gabapentin 800 milliGRAM(s) Oral three times a day  glucagon  Injectable 1 milliGRAM(s) IntraMuscular once  guaiFENesin ER 1200 milliGRAM(s) Oral every 12 hours  influenza   Vaccine 0.5 milliLiter(s) IntraMuscular once  insulin glargine Injectable (LANTUS) 40 Unit(s) SubCutaneous every morning  insulin lispro (ADMELOG) corrective regimen sliding scale   SubCutaneous three times a day before meals  insulin lispro (ADMELOG) corrective regimen sliding scale   SubCutaneous at bedtime  losartan 100 milliGRAM(s) Oral daily  methylPREDNISolone sodium succinate Injectable 40 milliGRAM(s) IV Push every 8 hours  spironolactone 25 milliGRAM(s) Oral daily  tiotropium 18 MICROgram(s) Capsule 1 Capsule(s) Inhalation daily    MEDICATIONS  (PRN):  benzonatate 100 milliGRAM(s) Oral every 8 hours PRN Cough  dextrose Oral Gel 15 Gram(s) Oral once PRN Blood Glucose LESS THAN 70 milliGRAM(s)/deciliter  oxycodone    5 mG/acetaminophen 325 mG 2 Tablet(s) Oral every 6 hours PRN Moderate Pain (4 - 6)      Allergies    No Known Allergies    Intolerances        REVIEW OF SYSTEMS:    CONSTITUTIONAL:  As per HPI.  HEENT:  Eyes:  No diplopia or blurred vision. ENT:  No earache, sore throat or runny nose.  CARDIOVASCULAR:  No pressure, squeezing, tightness, heaviness or aching about the chest, neck, axilla or epigastrium.  RESPIRATORY:  No cough, shortness of breath, PND or orthopnea.  GASTROINTESTINAL:  No nausea, vomiting or diarrhea.  GENITOURINARY:  No dysuria, frequency or urgency.  MUSCULOSKELETAL:  no joint pain, deformity, tenderness  EXTREMITIES: no clubbing cyanosis,edema  SKIN:  No change in skin, hair or nails.  NEUROLOGIC:  No paresthesias, fasciculations, seizures or weakness.  PSYCHIATRIC:  No disorder of thought or mood.  ENDOCRINE:  No heat or cold intolerance, polyuria or polydipsia.  HEMATOLOGICAL:  No easy bruising or bleedings:    Vital Signs Last 24 Hrs  T(C): 36.5 (14 Sep 2022 09:05), Max: 36.7 (13 Sep 2022 15:49)  T(F): 97.7 (14 Sep 2022 09:05), Max: 98.1 (13 Sep 2022 15:49)  HR: 93 (14 Sep 2022 09:05) (80 - 96)  BP: 136/67 (14 Sep 2022 09:05) (126/73 - 136/67)  BP(mean): --  RR: 18 (14 Sep 2022 09:05) (18 - 19)  SpO2: 93% (14 Sep 2022 09:05) (93% - 97%)    Parameters below as of 14 Sep 2022 09:05  Patient On (Oxygen Delivery Method): nasal cannula  O2 Flow (L/min): 2      PHYSICAL EXAMINATION:  SKIN: no rashes  HEAD: NC/AT  EYES: PERRLA, EOMI  EARS: TM's intact  NOSE: no abnormalities  NECK:  Supple. No lymphadenopathy. Jugular venous pressure not elevated. Carotids equal.   HEART:   S1S2 reg  CHEST:  good air entry; palpable density above left nipple  ABDOMEN:  Soft and nontender. obese  EXTREMITIES:  no C/C/E  NEURO: AAO x 3, no focal deficts       LABS:                        14.7   5.73  )-----------( 285      ( 13 Sep 2022 07:42 )             43.3     09-14    135  |  101  |  25<H>  ----------------------------<  380<H>  4.5   |  29  |  0.95    Ca    9.0      14 Sep 2022 07:42  Mg     2.1     09-12    TPro  6.6  /  Alb  3.4  /  TBili  0.6  /  DBili  x   /  AST  16  /  ALT  30  /  AlkPhos  60  09-13    PT/INR - ( 13 Sep 2022 07:42 )   PT: 12.1 sec;   INR: 1.04 ratio         PTT - ( 13 Sep 2022 07:42 )  PTT:33.3 sec  Urinalysis Basic - ( 13 Sep 2022 00:00 )    Color: Yellow / Appearance: Clear / S.025 / pH: x  Gluc: x / Ketone: Negative  / Bili: Negative / Urobili: Negative   Blood: x / Protein: Negative / Nitrite: Negative   Leuk Esterase: Negative / RBC: x / WBC x   Sq Epi: x / Non Sq Epi: x / Bacteria: x        RADIOLOGY & ADDITIONAL TESTS:

## 2022-09-14 NOTE — PROGRESS NOTE ADULT - SUBJECTIVE AND OBJECTIVE BOX
CHIEF COMPLAINT: Patient is a 62y old  Male who presents with a chief complaint of SOB (13 Sep 2022 04:32)      HPI: HPI:  61 y/o M w/ PMH of COPD, DM2, p/w SOB. SOB has been there for several weeks, however, has gotten worse over the last 2 days. SOB feels like wheezing, and is exacerbated with exertion such as walking and relieved with rest. Denies cough, but does have some "spitting up". Denies runny nose / sore throat / fever / chills / nausea / vomiting / abdominal pain.    PSH: partial lung lobectomy    Social Hx: Tobacco - quit in 1999, etoh - occasional, drugs - denies     Family Hx: DM     (13 Sep 2022 04:32)      PMHx: PAST MEDICAL & SURGICAL HISTORY:  History of COPD      DM (diabetes mellitus)      Asthma      S/P partial lobectomy of lung            Soc Hx:     FAMILY HISTORY:  FH: diabetes mellitus        Allergies: Allergies    No Known Allergies    Intolerances    09/13/2022-The patient received furosemide 20 IV in the ER.          REVIEW OF SYSTEMS:    As above  No chest pain + shortness of breath  No lightheadeness or syncope  No leg swelling  No palpitations  No claudication-like symptoms    ICU Vital Signs Last 24 Hrs  T(C): 36.7 (13 Sep 2022 15:49), Max: 36.7 (13 Sep 2022 15:49)  T(F): 98.1 (13 Sep 2022 15:49), Max: 98.1 (13 Sep 2022 15:49)  HR: 82 (13 Sep 2022 20:32) (80 - 96)  BP: 126/73 (13 Sep 2022 15:49) (126/73 - 133/79)  BP(mean): --  ABP: --  ABP(mean): --  RR: 19 (13 Sep 2022 15:49) (18 - 19)  SpO2: 94% (13 Sep 2022 15:49) (92% - 94%)    O2 Parameters below as of 14 Sep 2022 00:58  Patient On (Oxygen Delivery Method): nasal cannula              I&O's Summary      CAPILLARY BLOOD GLUCOSE          PHYSICAL EXAM:   Patient in NAD  Neck: No JVD; Carotids:  2+ without bruits  Respiratory:  + expiratory wheeze  Cardiovascular: S1 and S2, regular rate and rhythm, no Murmurs, gallops or rubs  Gastrointestinal:  Soft, non-tender; BS positive  Extremities: No peripheral edema  Vascular: 2+ peripheral pulses  Neurological: A/O x 3, no focal deficits      MEDICATIONS  (STANDING):  ALBUTerol    90 MICROgram(s) HFA Inhaler 2 Puff(s) Inhalation every 4 hours  atorvastatin 40 milliGRAM(s) Oral at bedtime  budesonide 160 MICROgram(s)/formoterol 4.5 MICROgram(s) Inhaler 2 Puff(s) Inhalation two times a day  dextrose 5%. 1000 milliLiter(s) (100 mL/Hr) IV Continuous <Continuous>  dextrose 5%. 1000 milliLiter(s) (50 mL/Hr) IV Continuous <Continuous>  dextrose 50% Injectable 25 Gram(s) IV Push once  dextrose 50% Injectable 12.5 Gram(s) IV Push once  dextrose 50% Injectable 25 Gram(s) IV Push once  enoxaparin Injectable 40 milliGRAM(s) SubCutaneous every 12 hours  gabapentin 800 milliGRAM(s) Oral three times a day  glucagon  Injectable 1 milliGRAM(s) IntraMuscular once  guaiFENesin ER 1200 milliGRAM(s) Oral every 12 hours  influenza   Vaccine 0.5 milliLiter(s) IntraMuscular once  insulin glargine Injectable (LANTUS) 40 Unit(s) SubCutaneous every morning  insulin lispro (ADMELOG) corrective regimen sliding scale   SubCutaneous three times a day before meals  insulin lispro (ADMELOG) corrective regimen sliding scale   SubCutaneous at bedtime  losartan 100 milliGRAM(s) Oral daily  methylPREDNISolone sodium succinate Injectable 40 milliGRAM(s) IV Push every 8 hours  spironolactone 25 milliGRAM(s) Oral daily  tiotropium 18 MICROgram(s) Capsule 1 Capsule(s) Inhalation daily        LABS: All Labs Reviewed:  Blood Culture:   BNP Serum Pro-Brain Natriuretic Peptide: 46 pg/mL (09-12 @ 23:38)    CBC             WBC Count: 5.47 K/uL (09-12 @ 23:38)              Hemoglobin: 13.5 g/dL (09-12 @ 23:38)              Hematocrit: 40.7 % (09-12 @ 23:38)              Mean Cell Volume: 90.6 fl (09-12 @ 23:38)              Platelet Count - Automated: 266 K/uL (09-12 @ 23:38)                            Cardiac markers   Troponin I, High Sensitivity (09.12.22 @ 23:38) Troponin I, High Sensitivity Result: 6.67                                       Chems        Sodium, Serum: 139 mmol/L (09-12 @ 23:38)          Potassium, Serum: 3.6 mmol/L (09-12 @ 23:38)          Blood Urea Nitrogen, Serum: 21 mg/dL (09-12 @ 23:38)          Creatinine 0.98          Magnesium, Serum: 2.1 mg/dL (09-12 @ 23:38)          Protein Total, Serum: 6.1 gm/dL (09-12 @ 23:38)                  Calcium, Total Serum: 9.0 mg/dL (09-12 @ 23:38)                  Bilirubin Total, Serum: 0.3 mg/dL (09-12 @ 23:38)          Alanine Aminotransferase (ALT/SGPT): 26 U/L (09-12 @ 23:38)          Aspartate Aminotransferase (AST/SGOT): 9 U/L (09-12 @ 23:38)                 INR: 1.05 ratio (09-12 @ 23:38)             RADIOLOGY:< from: Xray Chest 1 View-PORTABLE IMMEDIATE (06.20.22 @ 13:36) >  On August 9, 2011 there were basilar pleural pulmonary findings left   greater than right.    Present film shows improvement with slight residual.    IMPRESSION: Slight residual bibasilar findings.    < end of copied text >      EKG:  NSR; mild first degree AV block; low voltage; LAD; possible old ASWI        ECHO:  < from: Transthoracic Echocardiogram Follow Up (09.13.22 @ 10:11) >   Summary     Limited study to assess left ventricular function.   The left ventricle is normal in size, wall motion and contractility as   seen in limited views.   Mild concentric left ventricular hypertrophy is present.   Estimated left ventricular ejection fraction is 60 %.     Signature     ----------------------------------------------------------------   Electronically signed by Alexander Cruz MD(Interpreting   physician) on 09/13/2022 06:20 PM    < end of copied text >

## 2022-09-15 PROCEDURE — 99233 SBSQ HOSP IP/OBS HIGH 50: CPT

## 2022-09-15 RX ORDER — INSULIN LISPRO 100/ML
2 VIAL (ML) SUBCUTANEOUS ONCE
Refills: 0 | Status: COMPLETED | OUTPATIENT
Start: 2022-09-15 | End: 2022-09-15

## 2022-09-15 RX ADMIN — Medication 8 UNIT(S): at 08:50

## 2022-09-15 RX ADMIN — Medication 12 UNIT(S): at 13:27

## 2022-09-15 RX ADMIN — BUDESONIDE AND FORMOTEROL FUMARATE DIHYDRATE 2 PUFF(S): 160; 4.5 AEROSOL RESPIRATORY (INHALATION) at 09:46

## 2022-09-15 RX ADMIN — Medication 40 MILLIGRAM(S): at 05:49

## 2022-09-15 RX ADMIN — Medication 1: at 22:02

## 2022-09-15 RX ADMIN — INSULIN GLARGINE 50 UNIT(S): 100 INJECTION, SOLUTION SUBCUTANEOUS at 08:49

## 2022-09-15 RX ADMIN — Medication 5: at 17:50

## 2022-09-15 RX ADMIN — OXYCODONE AND ACETAMINOPHEN 2 TABLET(S): 5; 325 TABLET ORAL at 12:00

## 2022-09-15 RX ADMIN — GABAPENTIN 800 MILLIGRAM(S): 400 CAPSULE ORAL at 05:49

## 2022-09-15 RX ADMIN — LOSARTAN POTASSIUM 100 MILLIGRAM(S): 100 TABLET, FILM COATED ORAL at 10:48

## 2022-09-15 RX ADMIN — Medication 40 MILLIGRAM(S): at 10:49

## 2022-09-15 RX ADMIN — TIOTROPIUM BROMIDE 1 CAPSULE(S): 18 CAPSULE ORAL; RESPIRATORY (INHALATION) at 09:47

## 2022-09-15 RX ADMIN — ENOXAPARIN SODIUM 40 MILLIGRAM(S): 100 INJECTION SUBCUTANEOUS at 21:51

## 2022-09-15 RX ADMIN — Medication 12 UNIT(S): at 17:50

## 2022-09-15 RX ADMIN — ALBUTEROL 2 PUFF(S): 90 AEROSOL, METERED ORAL at 09:46

## 2022-09-15 RX ADMIN — Medication 4: at 13:27

## 2022-09-15 RX ADMIN — OXYCODONE AND ACETAMINOPHEN 2 TABLET(S): 5; 325 TABLET ORAL at 11:00

## 2022-09-15 RX ADMIN — Medication 40 MILLIGRAM(S): at 21:51

## 2022-09-15 RX ADMIN — OXYCODONE AND ACETAMINOPHEN 2 TABLET(S): 5; 325 TABLET ORAL at 21:54

## 2022-09-15 RX ADMIN — ALBUTEROL 2 PUFF(S): 90 AEROSOL, METERED ORAL at 20:01

## 2022-09-15 RX ADMIN — SPIRONOLACTONE 25 MILLIGRAM(S): 25 TABLET, FILM COATED ORAL at 10:48

## 2022-09-15 RX ADMIN — OXYCODONE AND ACETAMINOPHEN 2 TABLET(S): 5; 325 TABLET ORAL at 22:35

## 2022-09-15 RX ADMIN — Medication 4: at 08:46

## 2022-09-15 RX ADMIN — GABAPENTIN 800 MILLIGRAM(S): 400 CAPSULE ORAL at 13:25

## 2022-09-15 RX ADMIN — ATORVASTATIN CALCIUM 40 MILLIGRAM(S): 80 TABLET, FILM COATED ORAL at 21:50

## 2022-09-15 RX ADMIN — GABAPENTIN 800 MILLIGRAM(S): 400 CAPSULE ORAL at 21:51

## 2022-09-15 RX ADMIN — Medication 1200 MILLIGRAM(S): at 23:07

## 2022-09-15 RX ADMIN — Medication 2 UNIT(S): at 02:06

## 2022-09-15 RX ADMIN — Medication 1200 MILLIGRAM(S): at 10:48

## 2022-09-15 RX ADMIN — ALBUTEROL 2 PUFF(S): 90 AEROSOL, METERED ORAL at 18:32

## 2022-09-15 RX ADMIN — ENOXAPARIN SODIUM 40 MILLIGRAM(S): 100 INJECTION SUBCUTANEOUS at 10:48

## 2022-09-15 NOTE — PHARMACOTHERAPY INTERVENTION NOTE - INTERVENTION TYPE RECOOMEND
Timing/Frequency of Administration Recommended
Dose Optimization/Non-renal Dose Adjustment
Therapy Recommended - Drug indicated but not ordered
Therapy Recommended - Drug indicated but not ordered

## 2022-09-15 NOTE — CHART NOTE - NSCHARTNOTEFT_GEN_A_CORE
Notified by nurse of hyperglycemia of 419, given corrective insulin of 4units.   Patient is on IV solu-medrol.    Plan:   -Given 3units and then 2units insulin lispro.  -Glucose improved to 316.  -Recommend to optimize basal and nutritional insulin dosing

## 2022-09-15 NOTE — PROGRESS NOTE ADULT - SUBJECTIVE AND OBJECTIVE BOX
CC: SOB (13 Sep 2022 08:57)    HPI: 61 y/o M w/ PMH of COPD, DM2, p/w SOB. SOB has been there for several weeks, however, has gotten worse over the last 2 days. SOB feels like wheezing, and is exacerbated with exertion such as walking and relieved with rest. Denies cough, but does have some "spitting up". Denies runny nose / sore throat / fever / chills / nausea / vomiting / abdominal pain.    INTERVAL HPI/ OVERNIGHT EVENTS:   Pt was seen and examined,  feels breathing and cough improving, has joint pain and HA,  chronic     REVIEW OF SYSTEMS:  All other review of systems is negative unless indicated above.    PHYSICAL EXAM:  General: Well developed; obese,  in no acute distress, on NC   Eyes: EOMI; conjunctiva and sclera clear  Head: Normocephalic; atraumatic  ENMT: No nasal discharge; airway clear  Neck: Supple; non tender; no masses  Respiratory: Decreased BS, mild wheezing b/l   Cardiovascular: Regular rate and rhythm. S1 and S2 Normal;   Gastrointestinal: Soft non-tender non-distended; Normal bowel sounds  Genitourinary: No  suprapubic  tenderness  Extremities: No pitting edema  Vascular: Peripheral pulses palpable 2+ bilaterally  Neurological: Alert and oriented x3, non focal   Musculoskeletal: Normal muscle tone, without deformities  Psychiatric: Cooperative and appropriate    Labs:               135  |  101  |  25<H>  ----------------------------<  380<H>  4.5   |  29  |  0.95    Ca    9.0      14 Sep 2022 07:42      61 y/o M w/ PMH of COPD, DM2 admitted for:     * Acute  Hypoxic Respiratory failure due to Acute COPD Exacerbation   - RVP and COVID neg   - CTY chest no PNA, no PVC or effusions, + bronchiectasis   - OFF  IV Abxs   - C/w Solumedrol 40mg Q12H, REEVAL IN AM   - C/w Albuterol / Spiriva/Symbicort  - Supplement O2 via NC, wean off as tolerated   - LE doppler neg for DVT  - D-dimers neg   - BNP wnl, doubt CHF     * DM2 ( very poorly controlled blood sugars -  aggressive control of blood sugars)  -Humalog ISS + Basal insulin  -Diabetic diet   - Check HB A1c   - strongly recommended to loss weight    * HTN  - C/w Losartan and Spironolactone  - Hold Chlorthalidone     *Advance Directives  - Patient denies having advance care directives in place     *DVT ppx  -Lovenox

## 2022-09-16 ENCOUNTER — TRANSCRIPTION ENCOUNTER (OUTPATIENT)
Age: 62
End: 2022-09-16

## 2022-09-16 PROCEDURE — 99233 SBSQ HOSP IP/OBS HIGH 50: CPT

## 2022-09-16 RX ADMIN — BUDESONIDE AND FORMOTEROL FUMARATE DIHYDRATE 2 PUFF(S): 160; 4.5 AEROSOL RESPIRATORY (INHALATION) at 07:42

## 2022-09-16 RX ADMIN — ALBUTEROL 2 PUFF(S): 90 AEROSOL, METERED ORAL at 00:54

## 2022-09-16 RX ADMIN — Medication 3: at 12:40

## 2022-09-16 RX ADMIN — OXYCODONE AND ACETAMINOPHEN 2 TABLET(S): 5; 325 TABLET ORAL at 09:38

## 2022-09-16 RX ADMIN — SPIRONOLACTONE 25 MILLIGRAM(S): 25 TABLET, FILM COATED ORAL at 09:36

## 2022-09-16 RX ADMIN — Medication 1200 MILLIGRAM(S): at 09:36

## 2022-09-16 RX ADMIN — Medication 12 UNIT(S): at 08:27

## 2022-09-16 RX ADMIN — GABAPENTIN 800 MILLIGRAM(S): 400 CAPSULE ORAL at 05:57

## 2022-09-16 RX ADMIN — ALBUTEROL 2 PUFF(S): 90 AEROSOL, METERED ORAL at 11:41

## 2022-09-16 RX ADMIN — Medication 1: at 21:09

## 2022-09-16 RX ADMIN — TIOTROPIUM BROMIDE 1 CAPSULE(S): 18 CAPSULE ORAL; RESPIRATORY (INHALATION) at 07:42

## 2022-09-16 RX ADMIN — Medication 4: at 08:26

## 2022-09-16 RX ADMIN — ATORVASTATIN CALCIUM 40 MILLIGRAM(S): 80 TABLET, FILM COATED ORAL at 21:08

## 2022-09-16 RX ADMIN — OXYCODONE AND ACETAMINOPHEN 2 TABLET(S): 5; 325 TABLET ORAL at 10:35

## 2022-09-16 RX ADMIN — GABAPENTIN 800 MILLIGRAM(S): 400 CAPSULE ORAL at 17:20

## 2022-09-16 RX ADMIN — Medication 2: at 17:20

## 2022-09-16 RX ADMIN — ALBUTEROL 2 PUFF(S): 90 AEROSOL, METERED ORAL at 07:43

## 2022-09-16 RX ADMIN — ALBUTEROL 2 PUFF(S): 90 AEROSOL, METERED ORAL at 20:05

## 2022-09-16 RX ADMIN — GABAPENTIN 800 MILLIGRAM(S): 400 CAPSULE ORAL at 21:08

## 2022-09-16 RX ADMIN — ALBUTEROL 2 PUFF(S): 90 AEROSOL, METERED ORAL at 15:10

## 2022-09-16 RX ADMIN — Medication 1200 MILLIGRAM(S): at 21:08

## 2022-09-16 RX ADMIN — Medication 20 MILLIGRAM(S): at 21:08

## 2022-09-16 RX ADMIN — BUDESONIDE AND FORMOTEROL FUMARATE DIHYDRATE 2 PUFF(S): 160; 4.5 AEROSOL RESPIRATORY (INHALATION) at 20:05

## 2022-09-16 RX ADMIN — OXYCODONE AND ACETAMINOPHEN 2 TABLET(S): 5; 325 TABLET ORAL at 21:08

## 2022-09-16 RX ADMIN — INSULIN GLARGINE 50 UNIT(S): 100 INJECTION, SOLUTION SUBCUTANEOUS at 08:26

## 2022-09-16 RX ADMIN — LOSARTAN POTASSIUM 100 MILLIGRAM(S): 100 TABLET, FILM COATED ORAL at 09:36

## 2022-09-16 RX ADMIN — ENOXAPARIN SODIUM 40 MILLIGRAM(S): 100 INJECTION SUBCUTANEOUS at 09:36

## 2022-09-16 RX ADMIN — Medication 12 UNIT(S): at 12:41

## 2022-09-16 RX ADMIN — ENOXAPARIN SODIUM 40 MILLIGRAM(S): 100 INJECTION SUBCUTANEOUS at 21:09

## 2022-09-16 RX ADMIN — ALBUTEROL 2 PUFF(S): 90 AEROSOL, METERED ORAL at 04:26

## 2022-09-16 RX ADMIN — Medication 12 UNIT(S): at 17:21

## 2022-09-16 NOTE — DISCHARGE NOTE PROVIDER - HOSPITAL COURSE
CC: SOB (13 Sep 2022 08:57)    HPI: 63 y/o M w/ PMH of COPD, DM2, p/w SOB. SOB has been there for several weeks, however, has gotten worse over the last 2 days. SOB feels like wheezing, and is exacerbated with exertion such as walking and relieved with rest. Denies cough, but does have some "spitting up". Denies runny nose / sore throat / fever / chills / nausea / vomiting / abdominal pain.    Patient denies any HA, CP, SOB. No fevers, chills or shakes. I feel wheezing has significantly improved. Patient ambulating without any difficulties.     Physical Exam:   GENERAL APPEARANCE: Deconditioned, morbidly obese  T(C): 36.5 (09-16-22 @ 09:01), Max: 36.5 (09-15-22 @ 16:40)  HR: 91 (09-16-22 @ 11:45) (82 - 97)  BP: 143/82 (09-16-22 @ 09:01) (134/86 - 145/87)  RR: 18 (09-16-22 @ 09:01) (18 - 18)  SpO2: 96% (09-16-22 @ 11:45) (94% - 96%)  HEENT:  Head is normocephalic    Skin:  Warm and dry without any rash   NECK:  Supple without lymphadenopathy.   HEART:  Regular rate and rhythm. normal S1 and S2, No M/R/G  LUNGS:  Good ins/exp effort, no W/R/R/C  ABDOMEN:  Soft, nontender, nondistended with good bowel sounds heard  EXTREMITIES:  Without cyanosis, clubbing or edema.   NEUROLOGICAL:  Gross nonfocal      CC: SOB (13 Sep 2022 08:57)    HPI: 63 y/o M w/ PMH of COPD, DM2, p/w SOB. SOB has been there for several weeks, however, has gotten worse over the last 2 days. SOB feels like wheezing, and is exacerbated with exertion such as walking and relieved with rest. Denies cough, but does have some "spitting up". Denies runny nose / sore throat / fever / chills / nausea / vomiting / abdominal pain.    Denies any HA, CP, SOB. Comfortable. Minimal wheezing. We discussed importance -  of wearing a NIV /  weight loss.     Physical Exam:   GENERAL APPEARANCE: Deconditioned, morbidly obese  T(C): 36.5 (09-16-22 @ 09:01), Max: 36.5 (09-15-22 @ 16:40)  HR: 91 (09-16-22 @ 11:45) (82 - 97)  BP: 143/82 (09-16-22 @ 09:01) (134/86 - 145/87)  RR: 18 (09-16-22 @ 09:01) (18 - 18)  SpO2: 96% (09-16-22 @ 11:45) (94% - 96%)  HEENT:  Head is normocephalic    Skin:  Warm and dry without any rash   NECK:  Supple without lymphadenopathy.   HEART:  Regular rate and rhythm. normal S1 and S2, No M/R/G  LUNGS:  Good ins/exp effort, no W/R/R/C  ABDOMEN:  Soft, nontender, nondistended with good bowel sounds heard  EXTREMITIES:  Without cyanosis, clubbing or edema.   NEUROLOGICAL:  Gross nonfocal

## 2022-09-16 NOTE — PROGRESS NOTE ADULT - ASSESSMENT
- no distress today but slightly increased bronchospam  - cont O2 as needed  - on symbicort/spiriva  - cxr shows no infiltrates  - cont current steroids today  - CT scan w no infiltrates  - has hx ARIE but stopped wearing CPAP 2 years ago.  - will need surgical eval as outpatient for breast lump; nothing on CT scan  - dvt proph

## 2022-09-16 NOTE — DISCHARGE NOTE PROVIDER - NSDCMRMEDTOKEN_GEN_ALL_CORE_FT
albuterol 1.25 mg/3 mL (0.042%) inhalation solution: 3 milliliter(s) inhaled 3 times a day, As Needed  atorvastatin 40 mg oral tablet: 1 tab(s) orally once a day  chlorthalidone 25 mg oral tablet: 1 tab(s) orally once a day  empagliflozin 25 mg oral tablet: 1 tab(s) orally once a day (in the morning)  gabapentin 800 mg oral tablet: 1 tab(s) orally 3 times a day  glimepiride 2 mg oral tablet: 1 tab(s) orally 2 times a day  hydrocodone-acetaminophen 10 mg-325 mg oral tablet: 1 tab(s) orally every 6 hours, As Needed  losartan 100 mg oral tablet: 1 tab(s) orally once a day  metFORMIN 1000 mg oral tablet: 1 tab(s) orally 2 times a day  spironolactone 25 mg oral tablet: 1 tab(s) orally once a day  Symbicort 160 mcg-4.5 mcg/inh inhalation aerosol: 2 puff(s) inhaled 2 times a day  Tresiba FlexTouch 200 units/mL subcutaneous solution: 50 unit(s) subcutaneous once a day (at bedtime)   albuterol 1.25 mg/3 mL (0.042%) inhalation solution: 3 milliliter(s) inhaled 3 times a day, As Needed  atorvastatin 40 mg oral tablet: 1 tab(s) orally once a day  benzonatate 100 mg oral capsule: 1 cap(s) orally every 8 hours, As needed, Cough  empagliflozin 25 mg oral tablet: 1 tab(s) orally once a day (in the morning)  gabapentin 800 mg oral tablet: 1 tab(s) orally 3 times a day  glimepiride 2 mg oral tablet: 1 tab(s) orally 2 times a day  guaiFENesin 1200 mg oral tablet, extended release: 1 tab(s) orally every 12 hours  hydrocodone-acetaminophen 10 mg-325 mg oral tablet: 1 tab(s) orally every 6 hours, As Needed  losartan 100 mg oral tablet: 1 tab(s) orally once a day  metFORMIN 1000 mg oral tablet: 1 tab(s) orally 2 times a day  predniSONE 10 mg oral tablet: 2 tab(s) orally 2 times a day     Please take 20 mg orally 2 times daily x 3 days /  Please take 10 mg orally 2 times daily  spironolactone 25 mg oral tablet: 1 tab(s) orally once a day  Symbicort 160 mcg-4.5 mcg/inh inhalation aerosol: 2 puff(s) inhaled 2 times a day  tiotropium 18 mcg inhalation capsule: 1 cap(s) inhaled once a day  Tresiba FlexTouch 200 units/mL subcutaneous solution: 50 unit(s) subcutaneous once a day (at bedtime)

## 2022-09-16 NOTE — DISCHARGE NOTE PROVIDER - CARE PROVIDERS DIRECT ADDRESSES
[email or web address],[email or web address]

## 2022-09-16 NOTE — DISCHARGE NOTE PROVIDER - CARE PROVIDER_API CALL
Ranjit Johns)  Internal Medicine; Pulmonary Disease  [address], [facility] [address]  [place] [address]  Phone: [phone]  Fax: [phone]  Follow Up Time:     Alexander Cruz  CARDIOVASCULAR DISEASE  [address], [facility] [address]  [place] [address]  Phone: [phone]  Fax: [phone]  Follow Up Time:

## 2022-09-16 NOTE — DISCHARGE NOTE NURSING/CASE MANAGEMENT/SOCIAL WORK - PATIENT PORTAL LINK FT
You can access the FollowMyHealth Patient Portal offered by [facility] by registering at the following website: [email or web address]. By joining [facility]’s FollowMyHealth portal, you will also be able to view your health information using other applications (apps) compatible with our system.

## 2022-09-16 NOTE — DISCHARGE NOTE PROVIDER - NSDCCPCAREPLAN_GEN_ALL_CORE_FT
PRINCIPAL DISCHARGE DIAGNOSIS  Diagnosis: COPD exacerbation  Assessment and Plan of Treatment: WHAT IS CHRONIC OBSTRUCTIVE PULMONARY DISEASE? Chronic Obstructive Pulmonary Disease (COPD) is a chronic condition that affects the lungs, causing inflammation and limiting airflow. In most cases COPD gets worse overtime.  THINGS TO DO: (1) Use Purse-lip breathing any time you feel short of breath (Breath in through your nose and purse your lips with a slow and controlled exhale) (2) Avoid any triggers that make your symptoms worse (pollen or pollution) (3) Exercise daily (4) Do not smoke and avoid secondhand smoke (5) Attend pulmonary rehab as directed by your pulmonologist (6) Ask about vaccines with your primary care provider  MONITOR THESE SIGNS AND SYMPTOMS: (1) Worsening shortness of breath (2) Worsening cough or wheezing (3) Cough up blood (4) Chest Pain (5) Feeling confused or dizzy. If you experience any of these, DO alert your primary care provider, or return to the Emergency Department if you feel very sick.      SECONDARY DISCHARGE DIAGNOSES  Diagnosis: Breast lump  Assessment and Plan of Treatment: - CT scan demonstarted gynecomastia  - if your breast lumps do not go away, please follow up with your primary care physicainfor further workup. -You will need surgical eval as outpatient for breast lump.    Diagnosis: ARIE (obstructive sleep apnea)  Assessment and Plan of Treatment: - poor complience with CPAP    Diagnosis: Diabetes  Assessment and Plan of Treatment: - poorly controlled diabetes especially in the setting of steroid use

## 2022-09-16 NOTE — PROGRESS NOTE ADULT - SUBJECTIVE AND OBJECTIVE BOX
Subjective:  awake and alert  feeling much better    MEDICATIONS  (STANDING):  ALBUTerol    90 MICROgram(s) HFA Inhaler 2 Puff(s) Inhalation every 4 hours  atorvastatin 40 milliGRAM(s) Oral at bedtime  budesonide 160 MICROgram(s)/formoterol 4.5 MICROgram(s) Inhaler 2 Puff(s) Inhalation two times a day  dextrose 5%. 1000 milliLiter(s) (100 mL/Hr) IV Continuous <Continuous>  dextrose 5%. 1000 milliLiter(s) (50 mL/Hr) IV Continuous <Continuous>  dextrose 50% Injectable 25 Gram(s) IV Push once  dextrose 50% Injectable 12.5 Gram(s) IV Push once  dextrose 50% Injectable 25 Gram(s) IV Push once  enoxaparin Injectable 40 milliGRAM(s) SubCutaneous every 12 hours  gabapentin 800 milliGRAM(s) Oral three times a day  glucagon  Injectable 1 milliGRAM(s) IntraMuscular once  guaiFENesin ER 1200 milliGRAM(s) Oral every 12 hours  influenza   Vaccine 0.5 milliLiter(s) IntraMuscular once  insulin glargine Injectable (LANTUS) 50 Unit(s) SubCutaneous every morning  insulin lispro (ADMELOG) corrective regimen sliding scale   SubCutaneous three times a day before meals  insulin lispro (ADMELOG) corrective regimen sliding scale   SubCutaneous at bedtime  insulin lispro Injectable (ADMELOG) 12 Unit(s) SubCutaneous three times a day before meals  losartan 100 milliGRAM(s) Oral daily  methylPREDNISolone sodium succinate Injectable 40 milliGRAM(s) IV Push every 12 hours  spironolactone 25 milliGRAM(s) Oral daily  tiotropium 18 MICROgram(s) Capsule 1 Capsule(s) Inhalation daily    MEDICATIONS  (PRN):  benzonatate 100 milliGRAM(s) Oral every 8 hours PRN Cough  dextrose Oral Gel 15 Gram(s) Oral once PRN Blood Glucose LESS THAN 70 milliGRAM(s)/deciliter  oxycodone    5 mG/acetaminophen 325 mG 2 Tablet(s) Oral every 6 hours PRN Moderate Pain (4 - 6)      Allergies    No Known Allergies    Intolerances        REVIEW OF SYSTEMS:    CONSTITUTIONAL:  As per HPI.  HEENT:  Eyes:  No diplopia or blurred vision. ENT:  No earache, sore throat or runny nose.  CARDIOVASCULAR:  No pressure, squeezing, tightness, heaviness or aching about the chest, neck, axilla or epigastrium.  RESPIRATORY:  No cough, shortness of breath, PND or orthopnea.  GASTROINTESTINAL:  No nausea, vomiting or diarrhea.  GENITOURINARY:  No dysuria, frequency or urgency.  MUSCULOSKELETAL:  no joint pain, deformity, tenderness  EXTREMITIES: no clubbing cyanosis,edema  SKIN:  No change in skin, hair or nails.  NEUROLOGIC:  No paresthesias, fasciculations, seizures or weakness.  PSYCHIATRIC:  No disorder of thought or mood.  ENDOCRINE:  No heat or cold intolerance, polyuria or polydipsia.  HEMATOLOGICAL:  No easy bruising or bleedings:    Vital Signs Last 24 Hrs  T(C): 36.4 (16 Sep 2022 05:18), Max: 36.5 (15 Sep 2022 16:40)  T(F): 97.6 (16 Sep 2022 05:18), Max: 97.7 (15 Sep 2022 16:40)  HR: 91 (16 Sep 2022 07:48) (82 - 97)  BP: 145/87 (16 Sep 2022 05:18) (134/86 - 145/87)  BP(mean): --  RR: 18 (16 Sep 2022 05:18) (18 - 18)  SpO2: 96% (16 Sep 2022 07:48) (95% - 96%)    Parameters below as of 16 Sep 2022 07:48  Patient On (Oxygen Delivery Method): room air        PHYSICAL EXAMINATION:  SKIN: no rashes  HEAD: NC/AT  EYES: PERRLA, EOMI  EARS: TM's intact  NOSE: no abnormalities  NECK:  Supple. No lymphadenopathy. Jugular venous pressure not elevated. Carotids equal.   HEART:   The cardiac impulse has a normal quality. Reg., Nl S1 and S2.  There are no murmurs, rubs or gallops noted  CHEST:  scattered expiratory ronchi; left breast lump  ABDOMEN:  Soft and nontender.   EXTREMITIES:  no C/C/E  NEURO: AAO x 3, no focal deficts       LABS:                RADIOLOGY & ADDITIONAL TESTS:

## 2022-09-16 NOTE — DISCHARGE NOTE PROVIDER - NSDCFUADDAPPT_GEN_ALL_CORE_FT
Cardiology Follow-Appointment  Dr. Cruz Office  Date: 09/23/2022  Day: Friday  Time:10am  Phone: [phone] [address] [facility]

## 2022-09-17 VITALS — RESPIRATION RATE: 18 BRPM | OXYGEN SATURATION: 95 %

## 2022-09-17 PROCEDURE — 99239 HOSP IP/OBS DSCHRG MGMT >30: CPT

## 2022-09-17 PROCEDURE — 99233 SBSQ HOSP IP/OBS HIGH 50: CPT

## 2022-09-17 RX ORDER — EPINEPHRINE 0.3 MG/.3ML
3 INJECTION INTRAMUSCULAR; SUBCUTANEOUS
Qty: 1 | Refills: 0
Start: 2022-09-17 | End: 2022-09-17

## 2022-09-17 RX ORDER — TIOTROPIUM BROMIDE 18 UG/1
1 CAPSULE ORAL; RESPIRATORY (INHALATION)
Qty: 1 | Refills: 0
Start: 2022-09-17 | End: 2022-10-16

## 2022-09-17 RX ORDER — CHLORTHALIDONE 50 MG
1 TABLET ORAL
Qty: 0 | Refills: 0 | DISCHARGE

## 2022-09-17 RX ORDER — BUDESONIDE AND FORMOTEROL FUMARATE DIHYDRATE 160; 4.5 UG/1; UG/1
2 AEROSOL RESPIRATORY (INHALATION)
Qty: 1 | Refills: 0
Start: 2022-09-17 | End: 2022-09-17

## 2022-09-17 RX ADMIN — ALBUTEROL 2 PUFF(S): 90 AEROSOL, METERED ORAL at 07:57

## 2022-09-17 RX ADMIN — ALBUTEROL 2 PUFF(S): 90 AEROSOL, METERED ORAL at 04:40

## 2022-09-17 RX ADMIN — SPIRONOLACTONE 25 MILLIGRAM(S): 25 TABLET, FILM COATED ORAL at 10:15

## 2022-09-17 RX ADMIN — OXYCODONE AND ACETAMINOPHEN 2 TABLET(S): 5; 325 TABLET ORAL at 10:13

## 2022-09-17 RX ADMIN — GABAPENTIN 800 MILLIGRAM(S): 400 CAPSULE ORAL at 06:51

## 2022-09-17 RX ADMIN — Medication 12 UNIT(S): at 08:11

## 2022-09-17 RX ADMIN — Medication 1200 MILLIGRAM(S): at 10:14

## 2022-09-17 RX ADMIN — LOSARTAN POTASSIUM 100 MILLIGRAM(S): 100 TABLET, FILM COATED ORAL at 10:14

## 2022-09-17 RX ADMIN — ALBUTEROL 2 PUFF(S): 90 AEROSOL, METERED ORAL at 11:07

## 2022-09-17 RX ADMIN — Medication 20 MILLIGRAM(S): at 10:14

## 2022-09-17 RX ADMIN — ALBUTEROL 2 PUFF(S): 90 AEROSOL, METERED ORAL at 00:24

## 2022-09-17 RX ADMIN — Medication 2: at 08:10

## 2022-09-17 RX ADMIN — ENOXAPARIN SODIUM 40 MILLIGRAM(S): 100 INJECTION SUBCUTANEOUS at 10:15

## 2022-09-17 RX ADMIN — TIOTROPIUM BROMIDE 1 CAPSULE(S): 18 CAPSULE ORAL; RESPIRATORY (INHALATION) at 07:57

## 2022-09-17 RX ADMIN — INSULIN GLARGINE 50 UNIT(S): 100 INJECTION, SOLUTION SUBCUTANEOUS at 08:11

## 2022-09-17 RX ADMIN — BUDESONIDE AND FORMOTEROL FUMARATE DIHYDRATE 2 PUFF(S): 160; 4.5 AEROSOL RESPIRATORY (INHALATION) at 07:58

## 2022-09-17 NOTE — PROGRESS NOTE ADULT - SUBJECTIVE AND OBJECTIVE BOX
Subjective:    Awake, alert. Much improved.    MEDICATIONS  (STANDING):  ALBUTerol    90 MICROgram(s) HFA Inhaler 2 Puff(s) Inhalation every 4 hours  atorvastatin 40 milliGRAM(s) Oral at bedtime  budesonide 160 MICROgram(s)/formoterol 4.5 MICROgram(s) Inhaler 2 Puff(s) Inhalation two times a day  dextrose 5%. 1000 milliLiter(s) (100 mL/Hr) IV Continuous <Continuous>  dextrose 5%. 1000 milliLiter(s) (50 mL/Hr) IV Continuous <Continuous>  dextrose 50% Injectable 25 Gram(s) IV Push once  dextrose 50% Injectable 12.5 Gram(s) IV Push once  dextrose 50% Injectable 25 Gram(s) IV Push once  enoxaparin Injectable 40 milliGRAM(s) SubCutaneous every 12 hours  gabapentin 800 milliGRAM(s) Oral three times a day  glucagon  Injectable 1 milliGRAM(s) IntraMuscular once  guaiFENesin ER 1200 milliGRAM(s) Oral every 12 hours  influenza   Vaccine 0.5 milliLiter(s) IntraMuscular once  insulin glargine Injectable (LANTUS) 50 Unit(s) SubCutaneous every morning  insulin lispro (ADMELOG) corrective regimen sliding scale   SubCutaneous three times a day before meals  insulin lispro (ADMELOG) corrective regimen sliding scale   SubCutaneous at bedtime  insulin lispro Injectable (ADMELOG) 12 Unit(s) SubCutaneous three times a day before meals  losartan 100 milliGRAM(s) Oral daily  predniSONE   Tablet 20 milliGRAM(s) Oral every 12 hours  spironolactone 25 milliGRAM(s) Oral daily  tiotropium 18 MICROgram(s) Capsule 1 Capsule(s) Inhalation daily    MEDICATIONS  (PRN):  benzonatate 100 milliGRAM(s) Oral every 8 hours PRN Cough  dextrose Oral Gel 15 Gram(s) Oral once PRN Blood Glucose LESS THAN 70 milliGRAM(s)/deciliter  oxycodone    5 mG/acetaminophen 325 mG 2 Tablet(s) Oral every 6 hours PRN Moderate Pain (4 - 6)      Allergies    No Known Allergies    Intolerances        Vital Signs Last 24 Hrs  T(C): 36.3 (17 Sep 2022 08:52), Max: 36.6 (16 Sep 2022 20:54)  T(F): 97.3 (17 Sep 2022 08:52), Max: 97.8 (16 Sep 2022 20:54)  HR: 81 (17 Sep 2022 11:11) (65 - 98)  BP: 155/91 (17 Sep 2022 08:52) (148/78 - 155/91)  BP(mean): 98 (16 Sep 2022 17:21) (98 - 98)  RR: 18 (17 Sep 2022 10:30) (18 - 18)  SpO2: 95% (17 Sep 2022 10:30) (95% - 97%)    Parameters below as of 17 Sep 2022 10:30  Patient On (Oxygen Delivery Method): room air        PHYSICAL EXAMINATION:    NECK:  Supple. No lymphadenopathy. Jugular venous pressure not elevated.   HEART:   The cardiac impulse has a normal quality. Reg., Nl S1 and S2.    CHEST:  Chest with diffusely diminished BS and prolonged Exp. phase. Normal respiratory effort.  ABDOMEN:  Soft and nontender.   EXTREMITIES:  There is 1+ chronic edema.       LABS:                RADIOLOGY & ADDITIONAL TESTS:    Assessment and Plan:   · Assessment  	  - no distress today-to be D/C'ed home  - cont O2 as needed  - on symbicort/spiriva  - steroid taper  - has hx ARIE but stopped wearing CPAP 2 years ago-need to resume  - will need surgical eval as outpatient for breast lump  - F/U w/ Dr. Johns    Problem/Plan - 1:  ·  Problem: COPD exacerbation.   Problem/Plan - 2:  ·  Problem: Acute bronchospasm.   Problem/Plan - 3:  ·  Problem: SOB (shortness of breath).   Problem/Plan - 4:  ·  Problem: ARIE (obstructive sleep apnea).   Problem/Plan - 5:  ·  Problem: Diabetes mellitus.

## 2022-09-19 ENCOUNTER — NON-APPOINTMENT (OUTPATIENT)
Age: 62
End: 2022-09-19

## 2022-09-26 DIAGNOSIS — Z87.891 PERSONAL HISTORY OF NICOTINE DEPENDENCE: ICD-10-CM

## 2022-09-26 DIAGNOSIS — Z79.84 LONG TERM (CURRENT) USE OF ORAL HYPOGLYCEMIC DRUGS: ICD-10-CM

## 2022-09-26 DIAGNOSIS — E66.01 MORBID (SEVERE) OBESITY DUE TO EXCESS CALORIES: ICD-10-CM

## 2022-09-26 DIAGNOSIS — J96.01 ACUTE RESPIRATORY FAILURE WITH HYPOXIA: ICD-10-CM

## 2022-09-26 DIAGNOSIS — Z20.822 CONTACT WITH AND (SUSPECTED) EXPOSURE TO COVID-19: ICD-10-CM

## 2022-09-26 DIAGNOSIS — G47.33 OBSTRUCTIVE SLEEP APNEA (ADULT) (PEDIATRIC): ICD-10-CM

## 2022-09-26 DIAGNOSIS — Z28.310 UNVACCINATED FOR COVID-19: ICD-10-CM

## 2022-09-26 DIAGNOSIS — J44.1 CHRONIC OBSTRUCTIVE PULMONARY DISEASE WITH (ACUTE) EXACERBATION: ICD-10-CM

## 2022-09-26 DIAGNOSIS — R94.31 ABNORMAL ELECTROCARDIOGRAM [ECG] [EKG]: ICD-10-CM

## 2022-09-26 DIAGNOSIS — E11.65 TYPE 2 DIABETES MELLITUS WITH HYPERGLYCEMIA: ICD-10-CM

## 2022-09-26 DIAGNOSIS — I44.0 ATRIOVENTRICULAR BLOCK, FIRST DEGREE: ICD-10-CM

## 2022-09-26 DIAGNOSIS — Z91.19 PATIENT'S NONCOMPLIANCE WITH OTHER MEDICAL TREATMENT AND REGIMEN: ICD-10-CM

## 2022-09-26 DIAGNOSIS — N62 HYPERTROPHY OF BREAST: ICD-10-CM

## 2022-09-26 DIAGNOSIS — J98.01 ACUTE BRONCHOSPASM: ICD-10-CM

## 2022-11-08 ENCOUNTER — NON-APPOINTMENT (OUTPATIENT)
Age: 62
End: 2022-11-08

## 2022-11-08 ENCOUNTER — APPOINTMENT (OUTPATIENT)
Dept: INTERNAL MEDICINE | Facility: CLINIC | Age: 62
End: 2022-11-08

## 2022-11-08 VITALS
HEIGHT: 70 IN | DIASTOLIC BLOOD PRESSURE: 60 MMHG | HEART RATE: 92 BPM | WEIGHT: 315 LBS | OXYGEN SATURATION: 95 % | BODY MASS INDEX: 45.1 KG/M2 | TEMPERATURE: 98.2 F | SYSTOLIC BLOOD PRESSURE: 126 MMHG

## 2022-11-08 DIAGNOSIS — J44.9 CHRONIC OBSTRUCTIVE PULMONARY DISEASE, UNSPECIFIED: ICD-10-CM

## 2022-11-08 DIAGNOSIS — E78.2 MIXED HYPERLIPIDEMIA: ICD-10-CM

## 2022-11-08 DIAGNOSIS — E11.9 TYPE 2 DIABETES MELLITUS W/OUT COMPLICATIONS: ICD-10-CM

## 2022-11-08 DIAGNOSIS — Z87.891 PERSONAL HISTORY OF NICOTINE DEPENDENCE: ICD-10-CM

## 2022-11-08 DIAGNOSIS — R06.00 DYSPNEA, UNSPECIFIED: ICD-10-CM

## 2022-11-08 DIAGNOSIS — E66.01 MORBID (SEVERE) OBESITY DUE TO EXCESS CALORIES: ICD-10-CM

## 2022-11-08 DIAGNOSIS — G47.33 OBSTRUCTIVE SLEEP APNEA (ADULT) (PEDIATRIC): ICD-10-CM

## 2022-11-08 DIAGNOSIS — I10 ESSENTIAL (PRIMARY) HYPERTENSION: ICD-10-CM

## 2022-11-08 PROCEDURE — 99072 ADDL SUPL MATRL&STAF TM PHE: CPT

## 2022-11-08 PROCEDURE — 94060 EVALUATION OF WHEEZING: CPT

## 2022-11-08 PROCEDURE — 99214 OFFICE O/P EST MOD 30 MIN: CPT | Mod: 25

## 2022-11-08 RX ORDER — TIOTROPIUM BROMIDE 18 UG/1
18 CAPSULE ORAL; RESPIRATORY (INHALATION) DAILY
Qty: 1 | Refills: 3 | Status: ACTIVE | COMMUNITY
Start: 2022-09-17

## 2022-11-08 RX ORDER — BUDESONIDE AND FORMOTEROL FUMARATE DIHYDRATE 160; 4.5 UG/1; UG/1
160-4.5 AEROSOL RESPIRATORY (INHALATION)
Qty: 3 | Refills: 3 | Status: ACTIVE | COMMUNITY
Start: 2020-08-24 | End: 1900-01-01

## 2022-11-08 RX ORDER — ALBUTEROL SULFATE 90 UG/1
108 (90 BASE) INHALANT RESPIRATORY (INHALATION)
Qty: 1 | Refills: 5 | Status: ACTIVE | COMMUNITY
Start: 2022-11-08 | End: 1900-01-01

## 2022-11-08 NOTE — PLAN
[FreeTextEntry1] : Mr. Karen Herrera for a follow-up evaluation.\par \par 1.  Patient will continue on current medication regimen which has been reviewed and revised.\par \par 2.  Patient had 2 admissions to [facility] for exacerbation of COPD in September.  He will continue on current metered-dose inhaler therapy.  Physical examination does demonstrate scattered expiratory rhonchi.  Patient will not require oral steroids.\par \par 3.  He has a history of obstructive sleep apnea but refuses to wear CPAP mask.  He does have some symptoms of daytime fatigue.\par \par 4.  Patient is refusing the influenza vaccine.\par \par 5.  Patient has again been counseled in weight loss reduction through lifestyle modification with diet and exercise.  He does not wish to have bariatric surgery.\par \par 6.  Follow-up in 6 months.

## 2022-11-08 NOTE — PHYSICAL EXAM
[No Acute Distress] : no acute distress [Well Nourished] : well nourished [Well Developed] : well developed [Well-Appearing] : well-appearing [Normal Sclera/Conjunctiva] : normal sclera/conjunctiva [PERRL] : pupils equal round and reactive to light [EOMI] : extraocular movements intact [Normal Outer Ear/Nose] : the outer ears and nose were normal in appearance [Normal Oropharynx] : the oropharynx was normal [No JVD] : no jugular venous distention [No Lymphadenopathy] : no lymphadenopathy [Supple] : supple [Thyroid Normal, No Nodules] : the thyroid was normal and there were no nodules present [No Respiratory Distress] : no respiratory distress  [No Accessory Muscle Use] : no accessory muscle use [Normal Rate] : normal rate  [Regular Rhythm] : with a regular rhythm [Normal S1, S2] : normal S1 and S2 [No Murmur] : no murmur heard [No Carotid Bruits] : no carotid bruits [No Abdominal Bruit] : a ~M bruit was not heard ~T in the abdomen [No Varicosities] : no varicosities [Pedal Pulses Present] : the pedal pulses are present [No Edema] : there was no peripheral edema [No Palpable Aorta] : no palpable aorta [No Extremity Clubbing/Cyanosis] : no extremity clubbing/cyanosis [Soft] : abdomen soft [Non Tender] : non-tender [Non-distended] : non-distended [No Masses] : no abdominal mass palpated [No HSM] : no HSM [Normal Bowel Sounds] : normal bowel sounds [Normal Posterior Cervical Nodes] : no posterior cervical lymphadenopathy [Normal Anterior Cervical Nodes] : no anterior cervical lymphadenopathy [No CVA Tenderness] : no CVA  tenderness [No Spinal Tenderness] : no spinal tenderness [No Joint Swelling] : no joint swelling [Grossly Normal Strength/Tone] : grossly normal strength/tone [No Rash] : no rash [Coordination Grossly Intact] : coordination grossly intact [No Focal Deficits] : no focal deficits [Normal Gait] : normal gait [Deep Tendon Reflexes (DTR)] : deep tendon reflexes were 2+ and symmetric [Normal Affect] : the affect was normal [Normal Insight/Judgement] : insight and judgment were intact [de-identified] : Scattered expiratory rhonchi

## 2022-11-08 NOTE — HISTORY OF PRESENT ILLNESS
[FreeTextEntry1] : Follow-up evaluation [de-identified] : Mr. Jones presents for follow-up evaluation.\par Patient had 2 admissions to [facility] in September for exacerbation of COPD.\par Patient remains morbidly obese.  He has a history of obstructive sleep apnea but does not wear CPAP.\par He continues on Symbicort 160/4.5 mcg 2 puffs twice daily and Spiriva 1 puff daily.  He has albuterol for rescue therapy.\par Patient does continue to get shortness of breath with exertion.\par He denies any nocturnal symptoms of cough or dyspnea.

## 2022-11-08 NOTE — DATA REVIEWED
[FreeTextEntry1] : Spirometry pre and postbronchodilator therapy was performed.  FEV1 is 1.33 L which is 39% predicted.  FVC is 2.30 L which is 50% predicted.  FEV1/FVC ratio is 58%.  FEF 25/75% is 0.58 L/s which is 21% predicted.  PEF is 3.49 L/s which is 39% predicted.  There is no significant bronchodilator response.

## 2022-12-13 NOTE — PATIENT PROFILE ADULT - FUNCTIONAL ASSESSMENT - DAILY ACTIVITY SECTION LABEL
Impression: Age-related nuclear cataract, bilateral: H25.13. Plan: Discussed diagnosis of cataracts with patient. Patient has no significant visual complaints at this time and is able to perform all their daily activities. We will re-evaluate cataract annually unless patient notes any changes sooner. New glasses and CL Rx was given to patient. .

## 2023-04-03 NOTE — H&P ADULT - CARDIOVASCULAR SYMPTOMS
Diagnosis/Chief Complaint


Date of Admission


Mar 13, 2023 at 12:25


Date of Discharge





Discharge Date:  Apr 3, 2023


Discharge Diagnosis


Assessment:


Thromboembolic Stroke 2/2 Endocarditis s/p LIZ and completed 6 weeks of Rocephin


Infective Endocarditis of Atrial Valve


Dysarthria/expressive aphasia


Dysphagia with PEG tube


Right Knee Effusion 


Sacral Decubitus Ulcer with MRSA so started Vanc


Anemia-iron def so ordered Venofer


T2DM - ID


Hypothyroidism


Constipation


Hyperlipidemia


GERD


Chronic debility: (10/5/22 gastric ulcer perforation but used wheelchair 

periodically prior to that due to neuropathy, 11/4/22 Decubitus ulcer admit 

stage III wound vac placed, 1/29/23 used walker but wheelchair for long 

distances)


Urinary retention attempted to DC catheter 3/16/23 and required replacement of 

cath 3/17/23





Plan:


Monitor closely


Change Synthroid to PO


ST consult for dysphagia


Utilize PEG


PT OT





3/14/2023:


Monitor closely


Speech therapy to advanced diet if able





3/15/2023:


Monitor closely


Wound care





3/16/2023:


Dramatically improved


Wound care


IV abx





3/17/2023:


Urecholine


Monitor closely





3/18/2023:


DC tube feeding





3/19/2023:


DC catheter tomorrow








3/20/2023:


Wound care


Robles cath replaced





3/21/2023:


Monitor pain


IV abx





3/22/2023:


Maintain cath


Monitor closely





3/23/2023:


IVF for dehydration





3/24/2023:


HLIVF





3/25/2023:


Adjusted catheter


Azo





3/26/2023:


Monitor closely


Fall risk





3/27/2023:


Change Pyridium to prn





3/28/2023:


Monitoring closely





3/29/2023:


Monitor closely


Maintain robles I suspect neurogenic bladder





3/30/2023:


Monitor closely





3/31/2023:


Monitor closely





4/1/2023:


Labs tomorrow


Home Monday 4/2/2023:


DC tomorrow





(1) CVA (cerebral vascular accident)





Discharge Summary


Discharge Physical Examination


Allergies:  


Coded Allergies:  


     alprazolam (Verified  Allergy, Unknown, 3/24/23)


     baclofen (Verified  Allergy, Unknown, 3/24/23)


     cyclobenzaprine (Verified  Allergy, Unknown, 3/24/23)


Vitals & I&Os





Vital Signs








  Date Time  Temp Pulse Resp B/P (MAP) Pulse Ox O2 Delivery O2 Flow Rate FiO2


 


4/3/23 14:34 36.1 95 20 121/53 94 Room Air 0.00 








General Appearance:  Alert, Oriented X3, Cooperative


Respiratory:  Clear to Auscultation


Cardiovascular:  Regular Rate


Psych/Mental Status:  Mental Status NL





Hospital Course


Was the Problem List Reviewed?:  Yes


Lengthy course after arriving from [place] s/p catastrophic CVA. Chronic 

debility prior to CVA had left the patient wheelchair bound due to neuropathy. 

Sacral decubitus ulcer managed with wound vac and IV abx and management per Dr Zamorano and Dr Walker. Robles catheter maintained due to retention. Urology f/u 

will be needed as outpatient. Labs remained stable and she completed IV iron 

infusion. Bowel regimen maintained and she regained numerous ADL abilities in 

order to decrease caretaker burden at [place].


Labs (last 24 hrs)


Laboratory Tests


3/13/23 16:23: Glucometer 154H


3/13/23 20:02: Glucometer 123H


3/14/23 04:58: 


White Blood Count 6.3, Red Blood Count 3.94, Hemoglobin 9.2L, Hematocrit 31L, 

Mean Corpuscular Volume 78L, Mean Corpuscular Hemoglobin 23L, Mean Corpuscular 

Hemoglobin Concent 30L, Red Cell Distribution Width 19.9H, Platelet Count 349, 

Mean Platelet Volume 9.1, Immature Granulocyte % (Auto) 0, Neutrophils (%) 

(Auto) 71, Lymphocytes (%) (Auto) 17, Monocytes (%) (Auto) 8, Eosinophils (%) 

(Auto) 3, Basophils (%) (Auto) 1, Neutrophils # (Auto) 4.5, Lymphocytes # (Auto)

1.1, Monocytes # (Auto) 0.5, Eosinophils # (Auto) 0.2, Basophils # (Auto) 0.0, 

Immature Granulocyte # (Auto) 0.0, Sodium Level 137, Potassium Level 4.1, 

Chloride Level 102, Carbon Dioxide Level 26, Anion Gap 9, Blood Urea Nitrogen 

19H, Creatinine 0.62, Estimat Glomerular Filtration Rate 103, BUN/Creatinine 

Ratio 31, Glucose Level 158H, Calcium Level 9.1, Corrected Calcium 10.2H, Iron 

Level 40, Total Bilirubin 0.3, Aspartate Amino Transf (AST/SGOT) 18, Alanine 

Aminotransferase (ALT/SGPT) 10, Alkaline Phosphatase 94, Total Protein 6.4, 

Albumin 2.6L, Vitamin B12 Level 514


3/14/23 10:43: Glucometer 109


3/14/23 11:00: Glucometer 130H


3/14/23 15:52: Glucometer 144H


3/14/23 20:38: Glucometer 167H


3/15/23 05:49: Glucometer 114H


3/15/23 10:46: Glucometer 141H


3/15/23 16:21: Glucometer 134H


3/15/23 20:18: Glucometer 117H


3/16/23 05:37: Glucometer 127H


3/16/23 10:58: Glucometer 149H


3/16/23 11:32: Glucometer 140H


3/16/23 15:26: Glucometer 134H


3/16/23 20:08: Glucometer 87


3/17/23 00:00: 


Urine Color YELLOW, Urine Clarity CLEAR, Urine pH 5.5, Urine Specific Gravity 

<=1.005, Urine Protein NEGATIVE, Urine Glucose (UA) NEGATIVE, Urine Ketones 

NEGATIVE, Urine Nitrite NEGATIVE, Urine Bilirubin NEGATIVE, Urine Urobilinogen 

0.2, Urine Leukocyte Esterase NEGATIVE, Urine RBC (Auto) NEGATIVE, Urine RBC 

NONE, Urine WBC RARE, Urine Crystals PRESENTH, Urine Amorphous Sediment MOD SIDDHARTHA

URATESH, Urine Bacteria TRACE, Urine Casts NONE, Urine Mucus NEGATIVE, Urine 

Culture Indicated NO


3/17/23 05:16: Glucometer 73


3/17/23 09:30: Glucometer 102


3/17/23 10:00: Vancomycin Level Trough 24.5H


3/17/23 10:57: Glucometer 90


3/17/23 15:18: Glucometer 116H


3/17/23 18:05: Vancomycin Level Trough 17.8


3/17/23 20:04: Glucometer 125H


3/18/23 06:12: Glucometer 112H


3/18/23 10:52: Glucometer 136H


3/18/23 15:21: Glucometer 113H


3/18/23 20:18: Glucometer 96


3/19/23 06:23: Glucometer 146H


3/19/23 11:02: Glucometer 139H


3/19/23 15:32: Glucometer 111H


3/19/23 20:05: Vancomycin Level Trough 10.8


3/19/23 20:41: Glucometer 114H


3/20/23 05:40: 


White Blood Count 5.1, Red Blood Count 3.84, Hemoglobin 9.1L, Hematocrit 30L, 

Mean Corpuscular Volume 79L, Mean Corpuscular Hemoglobin 24L, Mean Corpuscular 

Hemoglobin Concent 30L, Red Cell Distribution Width 20.8H, Platelet Count 301, 

Mean Platelet Volume 9.1, Immature Granulocyte % (Auto) 0, Neutrophils (%) 

(Auto) 67, Lymphocytes (%) (Auto) 20, Monocytes (%) (Auto) 9, Eosinophils (%) 

(Auto) 3, Basophils (%) (Auto) 1, Neutrophils # (Auto) 3.4, Lymphocytes # (Auto)

1.0, Monocytes # (Auto) 0.4, Eosinophils # (Auto) 0.2, Basophils # (Auto) 0.1, 

Immature Granulocyte # (Auto) 0.0, Sodium Level 139, Potassium Level 3.2L, 

Chloride Level 106, Carbon Dioxide Level 23, Anion Gap 10, Blood Urea Nitrogen 

12, Creatinine 0.52L, Estimat Glomerular Filtration Rate 108, BUN/Creatinine 

Ratio 23, Glucose Level 108H, Calcium Level 8.8, Corrected Calcium 10.0, Total 

Bilirubin 0.4, Aspartate Amino Transf (AST/SGOT) 20, Alanine Aminotransferase 

(ALT/SGPT) 9, Alkaline Phosphatase 79, Total Protein 6.1L, Albumin 2.5L


3/20/23 11:02: Glucometer 128H


3/20/23 15:29: Glucometer 117H


3/20/23 15:45: 


Erythrocyte Sedimentation Rate 49H, C-Reactive Protein High Sensitivity 3.02H


3/20/23 20:30: Glucometer 110


3/21/23 06:00: Glucometer 107


3/21/23 10:50: Glucometer 161H


3/21/23 15:30: Glucometer 138H


3/21/23 20:49: Glucometer 147H


3/22/23 06:17: Glucometer 119H


3/22/23 10:42: Glucometer 146H


3/22/23 15:37: Glucometer 187H


3/22/23 20:05: Glucometer 137H


3/23/23 05:46: Glucometer 113H


3/23/23 10:45: Glucometer 120H


3/23/23 11:40: Vancomycin Level Trough 8.2L


3/23/23 15:39: Glucometer 85


3/23/23 20:25: Glucometer 157H


3/24/23 05:56: Glucometer 99


3/24/23 11:02: Glucometer 107


3/24/23 15:25: Glucometer 140H


3/24/23 20:19: Glucometer 120H


3/25/23 05:55: 


White Blood Count 4.7, Red Blood Count 3.74L, Hemoglobin 9.1L, Hematocrit 30L, 

Mean Corpuscular Volume 81, Mean Corpuscular Hemoglobin 24L, Mean Corpuscular 

Hemoglobin Concent 30L, Red Cell Distribution Width 21.5H, Platelet Count 262, 

Mean Platelet Volume 9.2, Immature Granulocyte % (Auto) 0, Neutrophils (%) 

(Auto) 62, Lymphocytes (%) (Auto) 25, Monocytes (%) (Auto) 7, Eosinophils (%) (A

uto) 4, Basophils (%) (Auto) 1, Neutrophils # (Auto) 3.0, Lymphocytes # (Auto) 

1.2, Monocytes # (Auto) 0.3, Eosinophils # (Auto) 0.2, Basophils # (Auto) 0.1, 

Immature Granulocyte # (Auto) 0.0, Sodium Level 141, Potassium Level 3.7, 

Chloride Level 111H, Carbon Dioxide Level 23, Anion Gap 7, Blood Urea Nitrogen 

12, Creatinine 0.52L, Estimat Glomerular Filtration Rate 108, BUN/Creatinine 

Ratio 23, Glucose Level 115H, Calcium Level 8.6, Corrected Calcium 9.7, Total 

Bilirubin 0.3, Aspartate Amino Transf (AST/SGOT) 16, Alanine Aminotransferase 

(ALT/SGPT) 10, Alkaline Phosphatase 73, Total Protein 5.8L, Albumin 2.6L


3/25/23 11:25: Glucometer 131H


3/25/23 15:52: Glucometer 134H


3/25/23 20:17: Glucometer 132H


3/26/23 05:28: Glucometer 131H


3/26/23 11:11: Glucometer 141H


3/26/23 16:01: Glucometer 135H


3/26/23 20:33: Glucometer 135H


3/27/23 06:12: 


White Blood Count 4.3, Red Blood Count 4.06, Hemoglobin 9.8L, Hematocrit 33L, 

Mean Corpuscular Volume 81, Mean Corpuscular Hemoglobin 24L, Mean Corpuscular 

Hemoglobin Concent 30L, Red Cell Distribution Width 21.9H, Platelet Count 260, 

Mean Platelet Volume 8.9L, Immature Granulocyte % (Auto) 1, Neutrophils (%) 

(Auto) 67, Lymphocytes (%) (Auto) 21, Monocytes (%) (Auto) 7, Eosinophils (%) 

(Auto) 4, Basophils (%) (Auto) 1, Neutrophils # (Auto) 2.9, Lymphocytes # (Auto)

0.9L, Monocytes # (Auto) 0.3, Eosinophils # (Auto) 0.2, Basophils # (Auto) 0.0, 

Immature Granulocyte # (Auto) 0.0, Sodium Level 141, Potassium Level 3.9, 

Chloride Level 109H, Carbon Dioxide Level 22, Anion Gap 10, Blood Urea Nitrogen 

10, Creatinine 0.55L, Estimat Glomerular Filtration Rate 106, BUN/Creatinine 

Ratio 18, Glucose Level 117H, Calcium Level 8.8, Corrected Calcium 9.8, Total 

Bilirubin 0.3, Aspartate Amino Transf (AST/SGOT) 16, Alanine Aminotransferase 

(ALT/SGPT) 11, Alkaline Phosphatase 82, Total Protein 6.1L, Albumin 2.7L


3/27/23 11:08: Glucometer 128H


3/27/23 15:53: Glucometer 134H


3/27/23 20:33: Glucometer 130H


3/28/23 05:26: Glucometer 113H


3/28/23 10:46: Glucometer 149H


3/28/23 15:45: Glucometer 173H


3/28/23 20:13: Glucometer 191H


3/29/23 06:05: Glucometer 125H


3/29/23 11:02: Glucometer 182H


3/29/23 15:11: Glucometer 185H


3/29/23 21:10: Glucometer 134H


3/30/23 05:51: Glucometer 122H


3/30/23 10:21: Glucometer 167H


3/30/23 12:50: Vancomycin Level Trough 11.6


3/30/23 15:35: Glucometer 131H


3/30/23 20:34: Glucometer 154H


3/31/23 06:02: Glucometer 114H


3/31/23 10:48: Glucometer 148H


3/31/23 15:24: Glucometer 148H


3/31/23 20:24: Glucometer 161H


4/1/23 10:38: Glucometer 151H


4/1/23 15:48: Glucometer 94


4/1/23 21:05: Glucometer 104


4/2/23 05:25: 


White Blood Count 4.4, Red Blood Count 3.74L, Hemoglobin 9.1L, Hematocrit 30L, 

Mean Corpuscular Volume 81, Mean Corpuscular Hemoglobin 24L, Mean Corpuscular 

Hemoglobin Concent 30L, Red Cell Distribution Width 21.2H, Platelet Count 204, 

Mean Platelet Volume 9.0, Immature Granulocyte % (Auto) 0, Neutrophils (%) (Aut

o) 63, Lymphocytes (%) (Auto) 25, Monocytes (%) (Auto) 8, Eosinophils (%) (Auto)

3, Basophils (%) (Auto) 1, Neutrophils # (Auto) 2.8, Lymphocytes # (Auto) 1.1, 

Monocytes # (Auto) 0.4, Eosinophils # (Auto) 0.2, Basophils # (Auto) 0.0, 

Immature Granulocyte # (Auto) 0.0, Sodium Level 138, Potassium Level 3.8, 

Chloride Level 106, Carbon Dioxide Level 24, Anion Gap 8, Blood Urea Nitrogen 

15, Creatinine 0.54L, Estimat Glomerular Filtration Rate 107, BUN/Creatinine 

Ratio 28, Glucose Level 117H, Calcium Level 9.2, Corrected Calcium 10.2H, Total 

Bilirubin 0.3, Aspartate Amino Transf (AST/SGOT) 12, Alanine Aminotransferase 

(ALT/SGPT) 10, Alkaline Phosphatase 73, Total Protein 5.7L, Albumin 2.8L


4/2/23 10:43: Glucometer 112H


4/2/23 16:52: Glucometer 102


4/2/23 20:49: Glucometer 97


4/3/23 05:58: Glucometer 97





Microbiology


3/13/23 Gram Stain - Final, Complete


          


3/13/23 Wound Culture - Final, Complete


          Staphylococcus aureus


          Gram Pos Mixed Bacterial Teressa





Pending Labs





Microbiology








 Date/Time


Source Procedure


Growth Status





 


 3/13/23 15:13


Ulcer Coccyx Gram Stain - Final Complete


 


 3/13/23 15:13 Wound Culture - Final


Staphylococcus aureus


Gram Pos Mixed Bacterial Teressa Complete





Laboratory Tests


3/13/23 16:23: Glucometer 154


3/13/23 20:02: Glucometer 123


3/14/23 04:58: 


White Blood Count 6.3, Red Blood Count 3.94, Hemoglobin 9.2, Hematocrit 31, Mean

Corpuscular Volume 78, Mean Corpuscular Hemoglobin 23, Mean Corpuscular 

Hemoglobin Concent 30, Red Cell Distribution Width 19.9, Platelet Count 349, 

Mean Platelet Volume 9.1, Immature Granulocyte % (Auto) 0, Neutrophils (%) 

(Auto) 71, Lymphocytes (%) (Auto) 17, Monocytes (%) (Auto) 8, Eosinophils (%) 

(Auto) 3, Basophils (%) (Auto) 1, Neutrophils # (Auto) 4.5, Lymphocytes # (Auto)

1.1, Monocytes # (Auto) 0.5, Eosinophils # (Auto) 0.2, Basophils # (Auto) 0.0, 

Immature Granulocyte # (Auto) 0.0, Sodium Level 137, Potassium Level 4.1, 

Chloride Level 102, Carbon Dioxide Level 26, Anion Gap 9, Blood Urea Nitrogen 

19, Creatinine 0.62, Estimat Glomerular Filtration Rate 103, BUN/Creatinine 

Ratio 31, Glucose Level 158, Calcium Level 9.1, Corrected Calcium 10.2, Iron L

evel 40, Total Bilirubin 0.3, Aspartate Amino Transf (AST/SGOT) 18, Alanine 

Aminotransferase (ALT/SGPT) 10, Alkaline Phosphatase 94, Total Protein 6.4, 

Albumin 2.6, Vitamin B12 Level 514


3/14/23 10:43: Glucometer 109


3/14/23 11:00: Glucometer 130


3/14/23 15:52: Glucometer 144


3/14/23 20:38: Glucometer 167


3/15/23 05:49: Glucometer 114


3/15/23 10:46: Glucometer 141


3/15/23 16:21: Glucometer 134


3/15/23 20:18: Glucometer 117


3/16/23 05:37: Glucometer 127


3/16/23 10:58: Glucometer 149


3/16/23 11:32: Glucometer 140


3/16/23 15:26: Glucometer 134


3/16/23 20:08: Glucometer 87


3/17/23 00:00: 


Urine Color YELLOW, Urine Clarity CLEAR, Urine pH 5.5, Urine Specific Gravity 

<=1.005, Urine Protein NEGATIVE, Urine Glucose (UA) NEGATIVE, Urine Ketones 

NEGATIVE, Urine Nitrite NEGATIVE, Urine Bilirubin NEGATIVE, Urine Urobilinogen 

0.2, Urine Leukocyte Esterase NEGATIVE, Urine RBC (Auto) NEGATIVE, Urine RBC 

NONE, Urine WBC RARE, Urine Crystals PRESENT, Urine Amorphous Sediment MOD SIDDHARTHA 

URATES, Urine Bacteria TRACE, Urine Casts NONE, Urine Mucus NEGATIVE, Urine 

Culture Indicated NO


3/17/23 05:16: Glucometer 73


3/17/23 09:30: Glucometer 102


3/17/23 10:00: Vancomycin Level Trough 24.5


3/17/23 10:57: Glucometer 90


3/17/23 15:18: Glucometer 116


3/17/23 18:05: Vancomycin Level Trough 17.8


3/17/23 20:04: Glucometer 125


3/18/23 06:12: Glucometer 112


3/18/23 10:52: Glucometer 136


3/18/23 15:21: Glucometer 113


3/18/23 20:18: Glucometer 96


3/19/23 06:23: Glucometer 146


3/19/23 11:02: Glucometer 139


3/19/23 15:32: Glucometer 111


3/19/23 20:05: Vancomycin Level Trough 10.8


3/19/23 20:41: Glucometer 114


3/20/23 05:40: 


White Blood Count 5.1, Red Blood Count 3.84, Hemoglobin 9.1, Hematocrit 30, Mean

Corpuscular Volume 79, Mean Corpuscular Hemoglobin 24, Mean Corpuscular 

Hemoglobin Concent 30, Red Cell Distribution Width 20.8, Platelet Count 301, 

Mean Platelet Volume 9.1, Immature Granulocyte % (Auto) 0, Neutrophils (%) 

(Auto) 67, Lymphocytes (%) (Auto) 20, Monocytes (%) (Auto) 9, Eosinophils (%) 

(Auto) 3, Basophils (%) (Auto) 1, Neutrophils # (Auto) 3.4, Lymphocytes # (Auto)

1.0, Monocytes # (Auto) 0.4, Eosinophils # (Auto) 0.2, Basophils # (Auto) 0.1, 

Immature Granulocyte # (Auto) 0.0, Sodium Level 139, Potassium Level 3.2, 

Chloride Level 106, Carbon Dioxide Level 23, Anion Gap 10, Blood Urea Nitrogen 

12, Creatinine 0.52, Estimat Glomerular Filtration Rate 108, BUN/Creatinine 

Ratio 23, Glucose Level 108, Calcium Level 8.8, Corrected Calcium 10.0, Total 

Bilirubin 0.4, Aspartate Amino Transf (AST/SGOT) 20, Alanine Aminotransferase 

(ALT/SGPT) 9, Alkaline Phosphatase 79, Total Protein 6.1, Albumin 2.5


3/20/23 11:02: Glucometer 128


3/20/23 15:29: Glucometer 117


3/20/23 15:45: 


Erythrocyte Sedimentation Rate 49, C-Reactive Protein High Sensitivity 3.02


3/20/23 20:30: Glucometer 110


3/21/23 06:00: Glucometer 107


3/21/23 10:50: Glucometer 161


3/21/23 15:30: Glucometer 138


3/21/23 20:49: Glucometer 147


3/22/23 06:17: Glucometer 119


3/22/23 10:42: Glucometer 146


3/22/23 15:37: Glucometer 187


3/22/23 20:05: Glucometer 137


3/23/23 05:46: Glucometer 113


3/23/23 10:45: Glucometer 120


3/23/23 11:40: Vancomycin Level Trough 8.2


3/23/23 15:39: Glucometer 85


3/23/23 20:25: Glucometer 157


3/24/23 05:56: Glucometer 99


3/24/23 11:02: Glucometer 107


3/24/23 15:25: Glucometer 140


3/24/23 20:19: Glucometer 120


3/25/23 05:55: 


White Blood Count 4.7, Red Blood Count 3.74, Hemoglobin 9.1, Hematocrit 30, Mean

Corpuscular Volume 81, Mean Corpuscular Hemoglobin 24, Mean Corpuscular 

Hemoglobin Concent 30, Red Cell Distribution Width 21.5, Platelet Count 262, 

Mean Platelet Volume 9.2, Immature Granulocyte % (Auto) 0, Neutrophils (%) 

(Auto) 62, Lymphocytes (%) (Auto) 25, Monocytes (%) (Auto) 7, Eosinophils (%) 

(Auto) 4, Basophils (%) (Auto) 1, Neutrophils # (Auto) 3.0, Lymphocytes # (Auto)

1.2, Monocytes # (Auto) 0.3, Eosinophils # (Auto) 0.2, Basophils # (Auto) 0.1, 

Immature Granulocyte # (Auto) 0.0, Sodium Level 141, Potassium Level 3.7, 

Chloride Level 111, Carbon Dioxide Level 23, Anion Gap 7, Blood Urea Nitrogen 

12, Creatinine 0.52, Estimat Glomerular Filtration Rate 108, BUN/Creatinine 

Ratio 23, Glucose Level 115, Calcium Level 8.6, Corrected Calcium 9.7, Total 

Bilirubin 0.3, Aspartate Amino Transf (AST/SGOT) 16, Alanine Aminotransferase 

(ALT/SGPT) 10, Alkaline Phosphatase 73, Total Protein 5.8, Albumin 2.6


3/25/23 11:25: Glucometer 131


3/25/23 15:52: Glucometer 134


3/25/23 20:17: Glucometer 132


3/26/23 05:28: Glucometer 131


3/26/23 11:11: Glucometer 141


3/26/23 16:01: Glucometer 135


3/26/23 20:33: Glucometer 135


3/27/23 06:12: 


White Blood Count 4.3, Red Blood Count 4.06, Hemoglobin 9.8, Hematocrit 33, Mean

Corpuscular Volume 81, Mean Corpuscular Hemoglobin 24, Mean Corpuscular 

Hemoglobin Concent 30, Red Cell Distribution Width 21.9, Platelet Count 260, 

Mean Platelet Volume 8.9, Immature Granulocyte % (Auto) 1, Neutrophils (%) (Aut

o) 67, Lymphocytes (%) (Auto) 21, Monocytes (%) (Auto) 7, Eosinophils (%) (Auto)

4, Basophils (%) (Auto) 1, Neutrophils # (Auto) 2.9, Lymphocytes # (Auto) 0.9, 

Monocytes # (Auto) 0.3, Eosinophils # (Auto) 0.2, Basophils # (Auto) 0.0, 

Immature Granulocyte # (Auto) 0.0, Sodium Level 141, Potassium Level 3.9, 

Chloride Level 109, Carbon Dioxide Level 22, Anion Gap 10, Blood Urea Nitrogen 

10, Creatinine 0.55, Estimat Glomerular Filtration Rate 106, BUN/Creatinine 

Ratio 18, Glucose Level 117, Calcium Level 8.8, Corrected Calcium 9.8, Total 

Bilirubin 0.3, Aspartate Amino Transf (AST/SGOT) 16, Alanine Aminotransferase 

(ALT/SGPT) 11, Alkaline Phosphatase 82, Total Protein 6.1, Albumin 2.7


3/27/23 11:08: Glucometer 128


3/27/23 15:53: Glucometer 134


3/27/23 20:33: Glucometer 130


3/28/23 05:26: Glucometer 113


3/28/23 10:46: Glucometer 149


3/28/23 15:45: Glucometer 173


3/28/23 20:13: Glucometer 191


3/29/23 06:05: Glucometer 125


3/29/23 11:02: Glucometer 182


3/29/23 15:11: Glucometer 185


3/29/23 21:10: Glucometer 134


3/30/23 05:51: Glucometer 122


3/30/23 10:21: Glucometer 167


3/30/23 12:50: Vancomycin Level Trough 11.6


3/30/23 15:35: Glucometer 131


3/30/23 20:34: Glucometer 154


3/31/23 06:02: Glucometer 114


3/31/23 10:48: Glucometer 148


3/31/23 15:24: Glucometer 148


3/31/23 20:24: Glucometer 161


4/1/23 10:38: Glucometer 151


4/1/23 15:48: Glucometer 94


4/1/23 21:05: Glucometer 104


4/2/23 05:25: 


White Blood Count 4.4, Red Blood Count 3.74, Hemoglobin 9.1, Hematocrit 30, Mean

Corpuscular Volume 81, Mean Corpuscular Hemoglobin 24, Mean Corpuscular 

Hemoglobin Concent 30, Red Cell Distribution Width 21.2, Platelet Count 204, 

Mean Platelet Volume 9.0, Immature Granulocyte % (Auto) 0, Neutrophils (%) 

(Auto) 63, Lymphocytes (%) (Auto) 25, Monocytes (%) (Auto) 8, Eosinophils (%) 

(Auto) 3, Basophils (%) (Auto) 1, Neutrophils # (Auto) 2.8, Lymphocytes # (Auto)

1.1, Monocytes # (Auto) 0.4, Eosinophils # (Auto) 0.2, Basophils # (Auto) 0.0, 

Immature Granulocyte # (Auto) 0.0, Sodium Level 138, Potassium Level 3.8, 

Chloride Level 106, Carbon Dioxide Level 24, Anion Gap 8, Blood Urea Nitrogen 

15, Creatinine 0.54, Estimat Glomerular Filtration Rate 107, BUN/Creatinine 

Ratio 28, Glucose Level 117, Calcium Level 9.2, Corrected Calcium 10.2, Total 

Bilirubin 0.3, Aspartate Amino Transf (AST/SGOT) 12, Alanine Aminotransferase 

(ALT/SGPT) 10, Alkaline Phosphatase 73, Total Protein 5.7, Albumin 2.8


4/2/23 10:43: Glucometer 112


4/2/23 16:52: Glucometer 102


4/2/23 20:49: Glucometer 97


4/3/23 05:58: Glucometer 97








Discharge


Home Medications:





Active Scripts


Active


Melatonin 3 Mg Tablet 6 Mg PO HS


Folic Acid 1 Mg Tablet 1 Mg PO DAILY


Vitamin B-12 (Cyanocobalamin (Vitamin B-12)) 1,000 Mcg Tablet 1,000 Mcg PO 

DAILY@0700


Nystatin 100,000 Unit/Gram Cream..g. 0 Gm TP TID


     twice daily


Lotrimin AF (Miconazole Nitrate) 2 % Powder 0 Gm TOP BID


     twice daily


Sucralfate 1 Gram Tablet 1 Gm PO ACHS


Famotidine 20 Mg Tablet 20 Mg PO BID


Amitriptyline HCl 25 Mg Tablet 25 Mg PO HS


Lyrica (Pregabalin) 75 Mg Capsule 75 Mg PO BID


HYDROcodone/APAP 10/325 TABLET (Acetaminophen/Hydrocodone Bitart) 1 Ea Tab 1 Ea 

PO Q6HR PRN


Lipitor (Atorvastatin Calcium) 40 Mg Tablet 40 Mg PO HS


Cyclobenzaprine HCl 10 Mg Tablet 10 Mg PO TID PRN


Bethanechol Chloride 25 Mg Tablet 25 Mg PO ACHS


Metformin HCl 1,000 Mg Tablet 1,000 Mg PO BID


Levothyroxine Sodium 175 Mcg Tablet 175 Mcg PO DAILY


Omeprazole 40 Mg Capsule.dr 40 Mg PO BID


Pioglitazone HCl 15 Mg Tablet 15 Mg PO DAILY


Reported


Tylenol (Acetaminophen) 325 Mg Tablet 650 Mg PO Q4H PRN





Instructions to patient/family


Please see electronic discharge instructions given to patient.





Diagnosis/Problems


Diagnosis/Problems





(1) CVA (cerebral vascular accident)











COOKIE CHRISTIE DO                 Apr 3, 2023 06:12 chest pain/dyspnea on exertion/paroxysmal nocturnal dyspnea/peripheral edema

## 2023-05-17 ENCOUNTER — APPOINTMENT (OUTPATIENT)
Dept: INTERNAL MEDICINE | Facility: CLINIC | Age: 63
End: 2023-05-17

## 2024-09-17 NOTE — ED STATDOCS - SCRIBE NAME
Spiritual Plan of Care    Pt Name: Rox Meza  Pt : 1996  Date: 2024    Visit Type: Phone    Referral Source: Interdisciplinary team    Reason for Visit: Sacramental/Communion    Visited With: Patient    Length of Visit: 10 minutes    Requires Follow-up: No    Spiritual Care Consult Needed: Spiritual Care eval completed    Spiritual Care Visit Preference: None     responded to consult.  reached out to pt via phone and pt was grateful for the call but is requesting communion. No further s/c needs noted by pt at this time and  remains available as needed at .    Min. Jr. SAILAJA JuárezDivTresa Dalal     Olivia Mariano

## 2025-04-08 NOTE — ED ADULT NURSE NOTE - ISOLATION TYPE:
[de-identified] : Left elbow 3 view radiographs were obtained and independently reviewed during today's visit. Healed nondisplaced lateral condyle fracture.  Anterior humeral line intersects the capitellum. Radio capitellar articulation is intact.
[de-identified] : Left elbow 3 view radiographs were obtained and independently reviewed during today's visit. Healed nondisplaced lateral condyle fracture.  Anterior humeral line intersects the capitellum. Radio capitellar articulation is intact.
None